# Patient Record
Sex: FEMALE | Race: WHITE | Employment: OTHER
[De-identification: names, ages, dates, MRNs, and addresses within clinical notes are randomized per-mention and may not be internally consistent; named-entity substitution may affect disease eponyms.]

---

## 2017-01-17 DIAGNOSIS — F41.9 ANXIETY: ICD-10-CM

## 2017-01-17 DIAGNOSIS — F32.0 MILD SINGLE CURRENT EPISODE OF MAJOR DEPRESSIVE DISORDER (HCC): ICD-10-CM

## 2017-01-18 DIAGNOSIS — K21.9 GASTROESOPHAGEAL REFLUX DISEASE, ESOPHAGITIS PRESENCE NOT SPECIFIED: ICD-10-CM

## 2017-01-18 DIAGNOSIS — E78.5 HYPERLIPIDEMIA, UNSPECIFIED HYPERLIPIDEMIA TYPE: ICD-10-CM

## 2017-01-18 RX ORDER — ATORVASTATIN CALCIUM 80 MG/1
80 TABLET, FILM COATED ORAL DAILY
Qty: 90 TABLET | Refills: 1 | Status: SHIPPED | OUTPATIENT
Start: 2017-01-18 | End: 2017-06-15 | Stop reason: SDUPTHER

## 2017-01-18 RX ORDER — RANITIDINE 150 MG/1
150 TABLET ORAL NIGHTLY
Qty: 90 TABLET | Refills: 1 | Status: SHIPPED | OUTPATIENT
Start: 2017-01-18 | End: 2017-06-15 | Stop reason: SDUPTHER

## 2017-01-18 RX ORDER — LORAZEPAM 0.5 MG/1
0.5 TABLET ORAL 2 TIMES DAILY PRN
Qty: 60 TABLET | Refills: 0 | Status: SHIPPED | OUTPATIENT
Start: 2017-01-18 | End: 2017-02-21 | Stop reason: SDUPTHER

## 2017-01-27 ENCOUNTER — CARE COORDINATION (OUTPATIENT)
Dept: CARE COORDINATION | Facility: CLINIC | Age: 67
End: 2017-01-27

## 2017-02-02 ENCOUNTER — OFFICE VISIT (OUTPATIENT)
Dept: FAMILY MEDICINE CLINIC | Facility: CLINIC | Age: 67
End: 2017-02-02

## 2017-02-02 VITALS
BODY MASS INDEX: 43.59 KG/M2 | HEART RATE: 64 BPM | TEMPERATURE: 96.5 F | DIASTOLIC BLOOD PRESSURE: 80 MMHG | SYSTOLIC BLOOD PRESSURE: 120 MMHG | WEIGHT: 246 LBS | HEIGHT: 63 IN | RESPIRATION RATE: 20 BRPM

## 2017-02-02 DIAGNOSIS — M43.10 SPONDYLOLISTHESIS, UNSPECIFIED SPINAL REGION: ICD-10-CM

## 2017-02-02 DIAGNOSIS — G47.33 OSA ON CPAP: ICD-10-CM

## 2017-02-02 DIAGNOSIS — M25.511 CHRONIC RIGHT SHOULDER PAIN: ICD-10-CM

## 2017-02-02 DIAGNOSIS — E78.5 HYPERLIPIDEMIA WITH TARGET LDL LESS THAN 100: ICD-10-CM

## 2017-02-02 DIAGNOSIS — G89.29 CHRONIC RIGHT SHOULDER PAIN: ICD-10-CM

## 2017-02-02 DIAGNOSIS — E66.01 MORBID OBESITY WITH BMI OF 40.0-44.9, ADULT (HCC): ICD-10-CM

## 2017-02-02 DIAGNOSIS — E11.21 DIABETIC NEPHROPATHY ASSOCIATED WITH TYPE 2 DIABETES MELLITUS (HCC): ICD-10-CM

## 2017-02-02 DIAGNOSIS — Z23 NEED FOR VACCINATION WITH 13-POLYVALENT PNEUMOCOCCAL CONJUGATE VACCINE: ICD-10-CM

## 2017-02-02 DIAGNOSIS — E66.09 NON MORBID OBESITY DUE TO EXCESS CALORIES: ICD-10-CM

## 2017-02-02 DIAGNOSIS — Z99.89 OSA ON CPAP: ICD-10-CM

## 2017-02-02 DIAGNOSIS — Z23 NEED FOR TDAP VACCINATION: ICD-10-CM

## 2017-02-02 DIAGNOSIS — N18.30 TYPE 2 DIABETES MELLITUS WITH STAGE 3 CHRONIC KIDNEY DISEASE, WITH LONG-TERM CURRENT USE OF INSULIN (HCC): ICD-10-CM

## 2017-02-02 DIAGNOSIS — J45.20 MILD INTERMITTENT ASTHMA WITHOUT COMPLICATION: ICD-10-CM

## 2017-02-02 DIAGNOSIS — F32.4 DEPRESSION, MAJOR, SINGLE EPISODE, IN PARTIAL REMISSION (HCC): ICD-10-CM

## 2017-02-02 DIAGNOSIS — Z98.1 S/P LUMBAR FUSION: ICD-10-CM

## 2017-02-02 DIAGNOSIS — J30.1 SEASONAL ALLERGIC RHINITIS DUE TO POLLEN: ICD-10-CM

## 2017-02-02 DIAGNOSIS — K21.9 GASTROESOPHAGEAL REFLUX DISEASE, ESOPHAGITIS PRESENCE NOT SPECIFIED: ICD-10-CM

## 2017-02-02 DIAGNOSIS — Z79.4 TYPE 2 DIABETES MELLITUS WITH STAGE 3 CHRONIC KIDNEY DISEASE, WITH LONG-TERM CURRENT USE OF INSULIN (HCC): ICD-10-CM

## 2017-02-02 DIAGNOSIS — I10 ESSENTIAL HYPERTENSION: Primary | ICD-10-CM

## 2017-02-02 DIAGNOSIS — G25.81 RESTLESS LEG SYNDROME: ICD-10-CM

## 2017-02-02 DIAGNOSIS — E11.22 TYPE 2 DIABETES MELLITUS WITH STAGE 3 CHRONIC KIDNEY DISEASE, WITH LONG-TERM CURRENT USE OF INSULIN (HCC): ICD-10-CM

## 2017-02-02 DIAGNOSIS — E11.21 TYPE 2 DIABETES WITH NEPHROPATHY (HCC): ICD-10-CM

## 2017-02-02 DIAGNOSIS — F41.9 ANXIETY: ICD-10-CM

## 2017-02-02 DIAGNOSIS — F32.0 MILD SINGLE CURRENT EPISODE OF MAJOR DEPRESSIVE DISORDER (HCC): ICD-10-CM

## 2017-02-02 PROCEDURE — 99214 OFFICE O/P EST MOD 30 MIN: CPT | Performed by: PEDIATRICS

## 2017-02-02 PROCEDURE — 90670 PCV13 VACCINE IM: CPT | Performed by: PEDIATRICS

## 2017-02-02 PROCEDURE — G0009 ADMIN PNEUMOCOCCAL VACCINE: HCPCS | Performed by: PEDIATRICS

## 2017-02-02 RX ORDER — HUMAN INSULIN 100 [USP'U]/ML
INJECTION, SUSPENSION SUBCUTANEOUS 2 TIMES DAILY WITH MEALS
COMMUNITY
Start: 2016-12-15

## 2017-02-02 ASSESSMENT — ENCOUNTER SYMPTOMS
CONSTIPATION: 0
STRIDOR: 0
EYE REDNESS: 0
EYE DISCHARGE: 0
SORE THROAT: 0
COLOR CHANGE: 0
SINUS PRESSURE: 0
VOMITING: 0
DIARRHEA: 0
NAUSEA: 0
EYE PAIN: 0
BLURRED VISION: 0
WHEEZING: 0
ABDOMINAL PAIN: 0
COUGH: 0
SHORTNESS OF BREATH: 0
BLOOD IN STOOL: 0

## 2017-02-07 PROBLEM — E11.21 DIABETIC NEPHROPATHY ASSOCIATED WITH TYPE 2 DIABETES MELLITUS (HCC): Status: ACTIVE | Noted: 2017-02-07

## 2017-02-07 ASSESSMENT — ENCOUNTER SYMPTOMS: BACK PAIN: 0

## 2017-02-10 ENCOUNTER — CARE COORDINATION (OUTPATIENT)
Dept: CARE COORDINATION | Facility: CLINIC | Age: 67
End: 2017-02-10

## 2017-02-21 DIAGNOSIS — F41.9 ANXIETY: ICD-10-CM

## 2017-02-21 DIAGNOSIS — F32.0 MILD SINGLE CURRENT EPISODE OF MAJOR DEPRESSIVE DISORDER (HCC): ICD-10-CM

## 2017-02-21 RX ORDER — LORAZEPAM 0.5 MG/1
0.5 TABLET ORAL 2 TIMES DAILY PRN
Qty: 60 TABLET | Refills: 0 | Status: SHIPPED | OUTPATIENT
Start: 2017-02-21 | End: 2017-03-20 | Stop reason: SDUPTHER

## 2017-03-06 RX ORDER — GABAPENTIN 300 MG/1
300 CAPSULE ORAL 3 TIMES DAILY
Qty: 270 CAPSULE | Refills: 1 | Status: SHIPPED | OUTPATIENT
Start: 2017-03-06 | End: 2017-04-17 | Stop reason: SDUPTHER

## 2017-03-10 ENCOUNTER — CARE COORDINATION (OUTPATIENT)
Dept: CARE COORDINATION | Facility: CLINIC | Age: 67
End: 2017-03-10

## 2017-03-21 ENCOUNTER — HOSPITAL ENCOUNTER (OUTPATIENT)
Age: 67
Discharge: HOME | End: 2017-03-21
Payer: MEDICARE

## 2017-03-21 ENCOUNTER — HOSPITAL ENCOUNTER (OUTPATIENT)
Dept: GENERAL RADIOLOGY | Age: 67
Discharge: HOME | End: 2017-03-21
Payer: MEDICARE

## 2017-03-21 DIAGNOSIS — G89.29 CHRONIC RIGHT SHOULDER PAIN: ICD-10-CM

## 2017-03-21 DIAGNOSIS — M25.511 CHRONIC RIGHT SHOULDER PAIN: ICD-10-CM

## 2017-03-21 LAB
ALBUMIN SERPL-MCNC: 4 G/DL (ref 3.5–5.2)
ALBUMIN/GLOBULIN RATIO: ABNORMAL (ref 1–2.5)
ALP BLD-CCNC: 177 U/L (ref 35–104)
ALT SERPL-CCNC: 14 U/L (ref 5–33)
ANION GAP SERPL CALCULATED.3IONS-SCNC: 15 MMOL/L (ref 9–17)
AST SERPL-CCNC: 14 U/L
BILIRUB SERPL-MCNC: 0.22 MG/DL (ref 0.3–1.2)
BUN BLDV-MCNC: 20 MG/DL (ref 8–23)
BUN/CREAT BLD: 16 (ref 9–20)
CALCIUM SERPL-MCNC: 9 MG/DL (ref 8.6–10.4)
CHLORIDE BLD-SCNC: 103 MMOL/L (ref 98–107)
CHOLESTEROL/HDL RATIO: 4.4
CHOLESTEROL: 159 MG/DL
CO2: 23 MMOL/L (ref 20–31)
CREAT SERPL-MCNC: 1.25 MG/DL (ref 0.5–0.9)
CREATININE URINE: 58.8 MG/DL (ref 28–217)
GFR AFRICAN AMERICAN: 52 ML/MIN
GFR NON-AFRICAN AMERICAN: 42 ML/MIN
GFR SERPL CREATININE-BSD FRML MDRD: ABNORMAL ML/MIN/{1.73_M2}
GFR SERPL CREATININE-BSD FRML MDRD: ABNORMAL ML/MIN/{1.73_M2}
GLUCOSE BLD-MCNC: 140 MG/DL (ref 70–99)
HDLC SERPL-MCNC: 36 MG/DL
LDL CHOLESTEROL: 76 MG/DL (ref 0–130)
MICROALBUMIN/CREAT 24H UR: <12 MG/L
MICROALBUMIN/CREAT UR-RTO: 20 MCG/MG CREAT
POTASSIUM SERPL-SCNC: 3.9 MMOL/L (ref 3.7–5.3)
SODIUM BLD-SCNC: 141 MMOL/L (ref 135–144)
TOTAL PROTEIN: 7 G/DL (ref 6.4–8.3)
TRIGL SERPL-MCNC: 235 MG/DL
VITAMIN B-12: 387 PG/ML (ref 211–946)
VLDLC SERPL CALC-MCNC: ABNORMAL MG/DL (ref 1–30)

## 2017-03-21 PROCEDURE — 73030 X-RAY EXAM OF SHOULDER: CPT

## 2017-03-21 PROCEDURE — 80061 LIPID PANEL: CPT

## 2017-03-21 PROCEDURE — 80053 COMPREHEN METABOLIC PANEL: CPT

## 2017-03-21 PROCEDURE — 82043 UR ALBUMIN QUANTITATIVE: CPT

## 2017-03-21 PROCEDURE — 82570 ASSAY OF URINE CREATININE: CPT

## 2017-03-21 PROCEDURE — 82607 VITAMIN B-12: CPT

## 2017-03-24 LAB — HBA1C MFR BLD: 7.1 %

## 2017-03-27 ENCOUNTER — CARE COORDINATION (OUTPATIENT)
Dept: CARE COORDINATION | Age: 67
End: 2017-03-27

## 2017-03-27 DIAGNOSIS — E78.5 HYPERLIPIDEMIA WITH TARGET LDL LESS THAN 100: Primary | ICD-10-CM

## 2017-03-27 DIAGNOSIS — I10 ESSENTIAL HYPERTENSION: ICD-10-CM

## 2017-03-27 DIAGNOSIS — D64.9 ANEMIA, UNSPECIFIED TYPE: ICD-10-CM

## 2017-03-27 RX ORDER — TIZANIDINE 2 MG/1
2 TABLET ORAL EVERY 8 HOURS PRN
Qty: 270 TABLET | Refills: 1 | Status: SHIPPED | OUTPATIENT
Start: 2017-03-27 | End: 2017-07-31 | Stop reason: SDUPTHER

## 2017-04-17 ENCOUNTER — TELEPHONE (OUTPATIENT)
Dept: FAMILY MEDICINE CLINIC | Age: 67
End: 2017-04-17

## 2017-04-17 DIAGNOSIS — F41.9 ANXIETY: ICD-10-CM

## 2017-04-17 RX ORDER — LORAZEPAM 0.5 MG/1
0.5 TABLET ORAL 2 TIMES DAILY PRN
Qty: 180 TABLET | Refills: 0 | Status: SHIPPED | OUTPATIENT
Start: 2017-04-17 | End: 2017-06-26 | Stop reason: SDUPTHER

## 2017-04-17 RX ORDER — GABAPENTIN 300 MG/1
300 CAPSULE ORAL 3 TIMES DAILY
Qty: 270 CAPSULE | Refills: 1 | Status: SHIPPED | OUTPATIENT
Start: 2017-04-17 | End: 2017-09-22 | Stop reason: SDUPTHER

## 2017-05-03 ENCOUNTER — OFFICE VISIT (OUTPATIENT)
Dept: FAMILY MEDICINE CLINIC | Age: 67
End: 2017-05-03
Payer: MEDICARE

## 2017-05-03 ENCOUNTER — CARE COORDINATION (OUTPATIENT)
Dept: CARE COORDINATION | Age: 67
End: 2017-05-03

## 2017-05-03 VITALS
WEIGHT: 246 LBS | BODY MASS INDEX: 43.58 KG/M2 | HEART RATE: 76 BPM | TEMPERATURE: 97.1 F | DIASTOLIC BLOOD PRESSURE: 74 MMHG | RESPIRATION RATE: 20 BRPM | SYSTOLIC BLOOD PRESSURE: 121 MMHG

## 2017-05-03 DIAGNOSIS — Z79.4 TYPE 2 DIABETES MELLITUS WITH STAGE 3 CHRONIC KIDNEY DISEASE, WITH LONG-TERM CURRENT USE OF INSULIN (HCC): ICD-10-CM

## 2017-05-03 DIAGNOSIS — M43.10 SPONDYLOLISTHESIS, UNSPECIFIED SPINAL REGION: ICD-10-CM

## 2017-05-03 DIAGNOSIS — G47.33 OSA ON CPAP: ICD-10-CM

## 2017-05-03 DIAGNOSIS — I10 ESSENTIAL HYPERTENSION: Primary | ICD-10-CM

## 2017-05-03 DIAGNOSIS — G25.81 RESTLESS LEG SYNDROME: ICD-10-CM

## 2017-05-03 DIAGNOSIS — J30.1 SEASONAL ALLERGIC RHINITIS DUE TO POLLEN: ICD-10-CM

## 2017-05-03 DIAGNOSIS — G89.29 CHRONIC RIGHT SHOULDER PAIN: ICD-10-CM

## 2017-05-03 DIAGNOSIS — F32.4 DEPRESSION, MAJOR, SINGLE EPISODE, IN PARTIAL REMISSION (HCC): ICD-10-CM

## 2017-05-03 DIAGNOSIS — K21.9 GASTROESOPHAGEAL REFLUX DISEASE, ESOPHAGITIS PRESENCE NOT SPECIFIED: ICD-10-CM

## 2017-05-03 DIAGNOSIS — Z99.89 OSA ON CPAP: ICD-10-CM

## 2017-05-03 DIAGNOSIS — F32.0 MILD SINGLE CURRENT EPISODE OF MAJOR DEPRESSIVE DISORDER (HCC): ICD-10-CM

## 2017-05-03 DIAGNOSIS — J45.20 MILD INTERMITTENT ASTHMA WITHOUT COMPLICATION: ICD-10-CM

## 2017-05-03 DIAGNOSIS — M25.511 CHRONIC RIGHT SHOULDER PAIN: ICD-10-CM

## 2017-05-03 DIAGNOSIS — Z98.1 S/P LUMBAR FUSION: ICD-10-CM

## 2017-05-03 DIAGNOSIS — E66.01 MORBID OBESITY WITH BMI OF 40.0-44.9, ADULT (HCC): ICD-10-CM

## 2017-05-03 DIAGNOSIS — E78.5 HYPERLIPIDEMIA WITH TARGET LDL LESS THAN 100: ICD-10-CM

## 2017-05-03 DIAGNOSIS — N18.30 TYPE 2 DIABETES MELLITUS WITH STAGE 3 CHRONIC KIDNEY DISEASE, WITH LONG-TERM CURRENT USE OF INSULIN (HCC): ICD-10-CM

## 2017-05-03 DIAGNOSIS — F41.9 ANXIETY: ICD-10-CM

## 2017-05-03 DIAGNOSIS — E11.21 DIABETIC NEPHROPATHY ASSOCIATED WITH TYPE 2 DIABETES MELLITUS (HCC): ICD-10-CM

## 2017-05-03 DIAGNOSIS — E11.22 TYPE 2 DIABETES MELLITUS WITH STAGE 3 CHRONIC KIDNEY DISEASE, WITH LONG-TERM CURRENT USE OF INSULIN (HCC): ICD-10-CM

## 2017-05-03 PROCEDURE — 99214 OFFICE O/P EST MOD 30 MIN: CPT | Performed by: PEDIATRICS

## 2017-05-03 RX ORDER — OXYCODONE HYDROCHLORIDE AND ACETAMINOPHEN 5; 325 MG/1; MG/1
1 TABLET ORAL DAILY PRN
Qty: 30 TABLET | Refills: 0 | Status: SHIPPED | OUTPATIENT
Start: 2017-05-03 | End: 2017-06-08

## 2017-05-03 RX ORDER — METFORMIN HYDROCHLORIDE 500 MG/1
TABLET, EXTENDED RELEASE ORAL
COMMUNITY
Start: 2017-04-13 | End: 2017-08-04 | Stop reason: SDUPTHER

## 2017-05-03 ASSESSMENT — ENCOUNTER SYMPTOMS
HOARSE VOICE: 0
BLURRED VISION: 0
EYE PAIN: 0
CONSTIPATION: 0
COUGH: 0
ABDOMINAL PAIN: 0
NAUSEA: 0
SHORTNESS OF BREATH: 0
WHEEZING: 0
HEARTBURN: 0
FREQUENT THROAT CLEARING: 0
SPUTUM PRODUCTION: 0
BLOOD IN STOOL: 0
SORE THROAT: 0
CHEST TIGHTNESS: 0
SINUS PRESSURE: 0
DIARRHEA: 0
EYE DISCHARGE: 0
HEMOPTYSIS: 0
RHINORRHEA: 0
VOMITING: 0
TROUBLE SWALLOWING: 0
COLOR CHANGE: 0
STRIDOR: 0
ORTHOPNEA: 0
DIFFICULTY BREATHING: 0
BACK PAIN: 1
EYE REDNESS: 0

## 2017-05-05 RX ORDER — MOXIFLOXACIN 5 MG/ML
1 SOLUTION/ DROPS OPHTHALMIC 3 TIMES DAILY
Qty: 1 BOTTLE | Refills: 0 | Status: SHIPPED | OUTPATIENT
Start: 2017-05-05 | End: 2017-05-12

## 2017-05-17 ENCOUNTER — CARE COORDINATION (OUTPATIENT)
Dept: CARE COORDINATION | Age: 67
End: 2017-05-17

## 2017-05-26 DIAGNOSIS — F41.9 ANXIETY: ICD-10-CM

## 2017-05-26 RX ORDER — CITALOPRAM 40 MG/1
40 TABLET ORAL DAILY
Qty: 90 TABLET | Refills: 1 | Status: SHIPPED | OUTPATIENT
Start: 2017-05-26 | End: 2017-10-11 | Stop reason: SDUPTHER

## 2017-05-31 ENCOUNTER — CARE COORDINATION (OUTPATIENT)
Dept: CARE COORDINATION | Age: 67
End: 2017-05-31

## 2017-06-03 ENCOUNTER — TELEPHONE (OUTPATIENT)
Dept: FAMILY MEDICINE CLINIC | Age: 67
End: 2017-06-03

## 2017-06-14 ENCOUNTER — CARE COORDINATION (OUTPATIENT)
Dept: CARE COORDINATION | Age: 67
End: 2017-06-14

## 2017-06-15 DIAGNOSIS — E78.5 HYPERLIPIDEMIA, UNSPECIFIED HYPERLIPIDEMIA TYPE: ICD-10-CM

## 2017-06-15 DIAGNOSIS — K21.9 GASTROESOPHAGEAL REFLUX DISEASE, ESOPHAGITIS PRESENCE NOT SPECIFIED: ICD-10-CM

## 2017-06-15 RX ORDER — ATORVASTATIN CALCIUM 80 MG/1
80 TABLET, FILM COATED ORAL DAILY
Qty: 90 TABLET | Refills: 1 | Status: SHIPPED | OUTPATIENT
Start: 2017-06-15 | End: 2017-10-11 | Stop reason: SDUPTHER

## 2017-06-15 RX ORDER — RANITIDINE 150 MG/1
150 TABLET ORAL NIGHTLY
Qty: 90 TABLET | Refills: 1 | Status: SHIPPED | OUTPATIENT
Start: 2017-06-15 | End: 2017-10-25 | Stop reason: SDUPTHER

## 2017-06-26 DIAGNOSIS — F41.9 ANXIETY: ICD-10-CM

## 2017-06-27 RX ORDER — LORAZEPAM 0.5 MG/1
0.5 TABLET ORAL 2 TIMES DAILY PRN
Qty: 180 TABLET | Refills: 0 | Status: SHIPPED | OUTPATIENT
Start: 2017-06-27 | End: 2017-09-13 | Stop reason: SDUPTHER

## 2017-07-19 ENCOUNTER — CARE COORDINATION (OUTPATIENT)
Dept: CARE COORDINATION | Age: 67
End: 2017-07-19

## 2017-07-26 ENCOUNTER — CARE COORDINATION (OUTPATIENT)
Dept: CARE COORDINATION | Age: 67
End: 2017-07-26

## 2017-08-01 RX ORDER — TIZANIDINE 2 MG/1
2 TABLET ORAL EVERY 8 HOURS PRN
Qty: 270 TABLET | Refills: 1 | Status: SHIPPED | OUTPATIENT
Start: 2017-08-01 | End: 2017-11-03 | Stop reason: SDUPTHER

## 2017-08-02 ENCOUNTER — CARE COORDINATION (OUTPATIENT)
Dept: CARE COORDINATION | Age: 67
End: 2017-08-02

## 2017-08-04 ENCOUNTER — OFFICE VISIT (OUTPATIENT)
Dept: FAMILY MEDICINE CLINIC | Age: 67
End: 2017-08-04
Payer: MEDICARE

## 2017-08-04 VITALS
BODY MASS INDEX: 44.56 KG/M2 | HEIGHT: 63 IN | RESPIRATION RATE: 16 BRPM | WEIGHT: 251.5 LBS | HEART RATE: 64 BPM | SYSTOLIC BLOOD PRESSURE: 124 MMHG | DIASTOLIC BLOOD PRESSURE: 72 MMHG

## 2017-08-04 DIAGNOSIS — F41.9 ANXIETY: ICD-10-CM

## 2017-08-04 DIAGNOSIS — F32.0 MILD SINGLE CURRENT EPISODE OF MAJOR DEPRESSIVE DISORDER (HCC): ICD-10-CM

## 2017-08-04 DIAGNOSIS — K21.9 GASTROESOPHAGEAL REFLUX DISEASE, ESOPHAGITIS PRESENCE NOT SPECIFIED: ICD-10-CM

## 2017-08-04 DIAGNOSIS — J45.20 MILD INTERMITTENT ASTHMA WITHOUT COMPLICATION: ICD-10-CM

## 2017-08-04 DIAGNOSIS — G25.81 RESTLESS LEG SYNDROME: ICD-10-CM

## 2017-08-04 DIAGNOSIS — G47.33 OSA ON CPAP: ICD-10-CM

## 2017-08-04 DIAGNOSIS — Z91.81 AT HIGH RISK FOR FALLS: ICD-10-CM

## 2017-08-04 DIAGNOSIS — E78.5 HYPERLIPIDEMIA WITH TARGET LDL LESS THAN 100: ICD-10-CM

## 2017-08-04 DIAGNOSIS — Z99.89 OSA ON CPAP: ICD-10-CM

## 2017-08-04 DIAGNOSIS — I10 ESSENTIAL HYPERTENSION: Primary | ICD-10-CM

## 2017-08-04 DIAGNOSIS — J30.1 SEASONAL ALLERGIC RHINITIS DUE TO POLLEN: ICD-10-CM

## 2017-08-04 PROCEDURE — 99214 OFFICE O/P EST MOD 30 MIN: CPT | Performed by: PEDIATRICS

## 2017-08-04 RX ORDER — DOXEPIN HYDROCHLORIDE 50 MG/1
50 CAPSULE ORAL NIGHTLY
Qty: 90 CAPSULE | Refills: 1 | Status: SHIPPED | OUTPATIENT
Start: 2017-08-04 | End: 2017-09-13 | Stop reason: ALTCHOICE

## 2017-08-04 RX ORDER — OXYCODONE HYDROCHLORIDE AND ACETAMINOPHEN 5; 325 MG/1; MG/1
1 TABLET ORAL EVERY 4 HOURS PRN
COMMUNITY
End: 2017-08-08 | Stop reason: SDUPTHER

## 2017-08-04 ASSESSMENT — ENCOUNTER SYMPTOMS
EYE DISCHARGE: 0
SINUS PRESSURE: 0
STRIDOR: 0
ORTHOPNEA: 0
BLOOD IN STOOL: 0
RHINORRHEA: 0
SORE THROAT: 0
EYE REDNESS: 0
COLOR CHANGE: 0
EYE PAIN: 0
ABDOMINAL PAIN: 0
BLURRED VISION: 0
SHORTNESS OF BREATH: 0
NAUSEA: 0
CONSTIPATION: 0
VOMITING: 0
TROUBLE SWALLOWING: 0
COUGH: 0
WHEEZING: 0
DIARRHEA: 0

## 2017-08-04 ASSESSMENT — PATIENT HEALTH QUESTIONNAIRE - PHQ9
2. FEELING DOWN, DEPRESSED OR HOPELESS: 1
SUM OF ALL RESPONSES TO PHQ QUESTIONS 1-9: 2
1. LITTLE INTEREST OR PLEASURE IN DOING THINGS: 1
SUM OF ALL RESPONSES TO PHQ9 QUESTIONS 1 & 2: 2

## 2017-08-06 ASSESSMENT — ENCOUNTER SYMPTOMS: BACK PAIN: 1

## 2017-08-08 RX ORDER — OXYCODONE HYDROCHLORIDE AND ACETAMINOPHEN 5; 325 MG/1; MG/1
1 TABLET ORAL DAILY PRN
Qty: 30 TABLET | Refills: 0 | Status: SHIPPED | OUTPATIENT
Start: 2017-08-08 | End: 2017-11-03 | Stop reason: SDUPTHER

## 2017-08-09 ENCOUNTER — TELEPHONE (OUTPATIENT)
Dept: FAMILY MEDICINE CLINIC | Age: 67
End: 2017-08-09

## 2017-08-11 RX ORDER — AMITRIPTYLINE HYDROCHLORIDE 10 MG/1
10 TABLET, FILM COATED ORAL NIGHTLY
Qty: 90 TABLET | Refills: 1 | Status: SHIPPED | OUTPATIENT
Start: 2017-08-11 | End: 2018-06-13 | Stop reason: ALTCHOICE

## 2017-08-16 ENCOUNTER — CARE COORDINATION (OUTPATIENT)
Dept: CARE COORDINATION | Age: 67
End: 2017-08-16

## 2017-08-27 RX ORDER — LOSARTAN POTASSIUM 100 MG/1
100 TABLET ORAL DAILY
Qty: 30 TABLET | Refills: 5 | Status: SHIPPED | OUTPATIENT
Start: 2017-08-27 | End: 2018-03-15 | Stop reason: SDUPTHER

## 2017-09-06 ENCOUNTER — CARE COORDINATION (OUTPATIENT)
Dept: CARE COORDINATION | Age: 67
End: 2017-09-06

## 2017-09-11 ENCOUNTER — HOSPITAL ENCOUNTER (OUTPATIENT)
Age: 67
Setting detail: SPECIMEN
Discharge: HOME | End: 2017-09-11
Payer: MEDICARE

## 2017-09-11 DIAGNOSIS — D64.9 ANEMIA, UNSPECIFIED TYPE: ICD-10-CM

## 2017-09-11 DIAGNOSIS — E78.5 HYPERLIPIDEMIA WITH TARGET LDL LESS THAN 100: ICD-10-CM

## 2017-09-11 LAB
ALBUMIN SERPL-MCNC: 4.2 G/DL (ref 3.5–5.2)
ALBUMIN/GLOBULIN RATIO: 1.8 (ref 1–2.5)
ALP BLD-CCNC: 187 U/L (ref 35–104)
ALT SERPL-CCNC: 17 U/L (ref 5–33)
ANION GAP SERPL CALCULATED.3IONS-SCNC: 19 MMOL/L (ref 9–17)
AST SERPL-CCNC: 17 U/L
BILIRUB SERPL-MCNC: 0.26 MG/DL (ref 0.3–1.2)
BUN BLDV-MCNC: 28 MG/DL (ref 8–23)
BUN/CREAT BLD: ABNORMAL (ref 9–20)
CALCIUM SERPL-MCNC: 9 MG/DL (ref 8.6–10.4)
CHLORIDE BLD-SCNC: 106 MMOL/L (ref 98–107)
CHOLESTEROL/HDL RATIO: 4.3
CHOLESTEROL: 154 MG/DL
CO2: 19 MMOL/L (ref 20–31)
CREAT SERPL-MCNC: 1.41 MG/DL (ref 0.5–0.9)
GFR AFRICAN AMERICAN: 45 ML/MIN
GFR NON-AFRICAN AMERICAN: 37 ML/MIN
GFR SERPL CREATININE-BSD FRML MDRD: ABNORMAL ML/MIN/{1.73_M2}
GFR SERPL CREATININE-BSD FRML MDRD: ABNORMAL ML/MIN/{1.73_M2}
GLUCOSE FASTING: 116 MG/DL (ref 70–99)
HCT VFR BLD CALC: 35.8 % (ref 36–46)
HDLC SERPL-MCNC: 36 MG/DL
HEMOGLOBIN: 11.8 G/DL (ref 12–16)
LDL CHOLESTEROL: 76 MG/DL (ref 0–130)
MCH RBC QN AUTO: 28.6 PG (ref 26–34)
MCHC RBC AUTO-ENTMCNC: 33 G/DL (ref 31–37)
MCV RBC AUTO: 86.7 FL (ref 80–100)
PDW BLD-RTO: 14.9 % (ref 12.5–15.4)
PLATELET # BLD: 211 K/UL (ref 140–450)
PMV BLD AUTO: 8.6 FL (ref 6–12)
POTASSIUM SERPL-SCNC: 4.6 MMOL/L (ref 3.7–5.3)
RBC # BLD: 4.13 M/UL (ref 4–5.2)
SODIUM BLD-SCNC: 144 MMOL/L (ref 135–144)
TOTAL PROTEIN: 6.5 G/DL (ref 6.4–8.3)
TRIGL SERPL-MCNC: 209 MG/DL
VLDLC SERPL CALC-MCNC: ABNORMAL MG/DL (ref 1–30)
WBC # BLD: 6.8 K/UL (ref 3.5–11)

## 2017-09-13 ENCOUNTER — OFFICE VISIT (OUTPATIENT)
Dept: FAMILY MEDICINE CLINIC | Age: 67
End: 2017-09-13
Payer: MEDICARE

## 2017-09-13 VITALS
HEART RATE: 68 BPM | DIASTOLIC BLOOD PRESSURE: 76 MMHG | RESPIRATION RATE: 20 BRPM | BODY MASS INDEX: 43.06 KG/M2 | SYSTOLIC BLOOD PRESSURE: 121 MMHG | WEIGHT: 243 LBS | TEMPERATURE: 97.8 F

## 2017-09-13 DIAGNOSIS — F32.0 MILD SINGLE CURRENT EPISODE OF MAJOR DEPRESSIVE DISORDER (HCC): Primary | ICD-10-CM

## 2017-09-13 DIAGNOSIS — Z23 NEED FOR INFLUENZA VACCINATION: ICD-10-CM

## 2017-09-13 DIAGNOSIS — F41.9 ANXIETY: ICD-10-CM

## 2017-09-13 PROCEDURE — 99214 OFFICE O/P EST MOD 30 MIN: CPT | Performed by: PEDIATRICS

## 2017-09-13 PROCEDURE — G0008 ADMIN INFLUENZA VIRUS VAC: HCPCS | Performed by: PEDIATRICS

## 2017-09-13 PROCEDURE — 90688 IIV4 VACCINE SPLT 0.5 ML IM: CPT | Performed by: PEDIATRICS

## 2017-09-13 RX ORDER — LORAZEPAM 0.5 MG/1
0.5 TABLET ORAL 2 TIMES DAILY PRN
Qty: 180 TABLET | Refills: 0 | Status: CANCELLED | OUTPATIENT
Start: 2017-09-13 | End: 2018-09-13

## 2017-09-13 RX ORDER — LORAZEPAM 0.5 MG/1
0.5 TABLET ORAL 2 TIMES DAILY PRN
Qty: 180 TABLET | Refills: 0 | Status: SHIPPED | OUTPATIENT
Start: 2017-09-13 | End: 2017-12-06 | Stop reason: SDUPTHER

## 2017-09-13 ASSESSMENT — ENCOUNTER SYMPTOMS
STRIDOR: 0
NAUSEA: 0
EYE DISCHARGE: 0
BLOOD IN STOOL: 0
ABDOMINAL PAIN: 0
EYE PAIN: 0
DIARRHEA: 0
CONSTIPATION: 0
TROUBLE SWALLOWING: 0
SORE THROAT: 0
COUGH: 0
EYE REDNESS: 0
SHORTNESS OF BREATH: 0
COLOR CHANGE: 0
RHINORRHEA: 0
SINUS PRESSURE: 0
VOMITING: 0
WHEEZING: 0
BACK PAIN: 1

## 2017-09-14 ENCOUNTER — TELEPHONE (OUTPATIENT)
Dept: FAMILY MEDICINE CLINIC | Age: 67
End: 2017-09-14

## 2017-09-14 DIAGNOSIS — I10 ESSENTIAL HYPERTENSION: Primary | ICD-10-CM

## 2017-09-14 DIAGNOSIS — D64.9 LOW HEMOGLOBIN AND LOW HEMATOCRIT: ICD-10-CM

## 2017-09-14 DIAGNOSIS — E11.8 TYPE 2 DIABETES MELLITUS WITH COMPLICATION, WITH LONG-TERM CURRENT USE OF INSULIN (HCC): ICD-10-CM

## 2017-09-14 DIAGNOSIS — E78.2 ELEVATED TRIGLYCERIDES WITH HIGH CHOLESTEROL: ICD-10-CM

## 2017-09-14 DIAGNOSIS — E78.6 LOW HDL (UNDER 40): ICD-10-CM

## 2017-09-14 DIAGNOSIS — Z79.4 TYPE 2 DIABETES MELLITUS WITH COMPLICATION, WITH LONG-TERM CURRENT USE OF INSULIN (HCC): ICD-10-CM

## 2017-09-14 DIAGNOSIS — E78.5 HYPERLIPIDEMIA WITH TARGET LDL LESS THAN 100: ICD-10-CM

## 2017-09-14 DIAGNOSIS — G47.30 SLEEP APNEA, UNSPECIFIED TYPE: ICD-10-CM

## 2017-09-22 RX ORDER — GABAPENTIN 300 MG/1
300 CAPSULE ORAL 3 TIMES DAILY
Qty: 270 CAPSULE | Refills: 1 | Status: SHIPPED | OUTPATIENT
Start: 2017-09-22 | End: 2018-05-03 | Stop reason: SDUPTHER

## 2017-10-09 ENCOUNTER — CARE COORDINATION (OUTPATIENT)
Dept: CARE COORDINATION | Age: 67
End: 2017-10-09

## 2017-10-09 NOTE — CARE COORDINATION
tablet Take 1 tablet by mouth every morning (before breakfast)  5/13/14   Historical Provider, MD IZAGUIRRE ULTRAFINE III SHORT PEN 31G X 8 MM MISC  1/23/14   Historical Provider, MD   Ascorbic Acid (VITAMIN C) 500 MG tablet Take 1,000 mg by mouth nightly     Historical Provider, MD   omeprazole (PRILOSEC) 20 MG capsule Take 20 mg by mouth nightly Indications: GERD-Related Laryngitis     Historical Provider, MD   albuterol (PROVENTIL) (2.5 MG/3ML) 0.083% nebulizer solution Take 2.5 mg by nebulization every 4 hours as needed for Wheezing.     Yumiko Macedo MD   Multiple Vitamins-Minerals [place] [facility] COMPLETE PO) Take 1 tablet by mouth nightly     Historical Provider, MD   Sennosides-Docusate Sodium (SENNA S PO) Take 1 tablet by mouth nightly     Historical Provider, MD   Probiotic Product ([address]) Take 1 tablet by mouth nightly     Historical Provider, MD       Future Appointments  Date Time Provider Eva Dent   11/3/2017 11:00 AM MD Ángel Chavarria

## 2017-10-11 DIAGNOSIS — F41.9 ANXIETY: ICD-10-CM

## 2017-10-11 DIAGNOSIS — E78.5 HYPERLIPIDEMIA, UNSPECIFIED HYPERLIPIDEMIA TYPE: ICD-10-CM

## 2017-10-11 RX ORDER — ATORVASTATIN CALCIUM 80 MG/1
80 TABLET, FILM COATED ORAL DAILY
Qty: 90 TABLET | Refills: 1 | Status: SHIPPED | OUTPATIENT
Start: 2017-10-11 | End: 2017-10-11 | Stop reason: SDUPTHER

## 2017-10-11 RX ORDER — CITALOPRAM 40 MG/1
40 TABLET ORAL DAILY
Qty: 90 TABLET | Refills: 1 | Status: SHIPPED | OUTPATIENT
Start: 2017-10-11 | End: 2018-03-09 | Stop reason: SDUPTHER

## 2017-10-11 RX ORDER — ATORVASTATIN CALCIUM 80 MG/1
80 TABLET, FILM COATED ORAL DAILY
Qty: 90 TABLET | Refills: 1 | Status: SHIPPED | OUTPATIENT
Start: 2017-10-11 | End: 2018-03-09 | Stop reason: SDUPTHER

## 2017-10-11 NOTE — TELEPHONE ENCOUNTER
Medication failed in transmission to pharm. Medication will need resent to [facility]  BP
Spondylisthesis     S/P lumbar fusion     Gastroesophageal reflux disease     Diabetic nephropathy associated with type 2 diabetes mellitus ([facility] [address] [address]

## 2017-10-11 NOTE — TELEPHONE ENCOUNTER
Spondylisthesis     S/P lumbar fusion     Gastroesophageal reflux disease     Diabetic nephropathy associated with type 2 diabetes mellitus ([facility] [address]

## 2017-10-25 DIAGNOSIS — K21.9 GASTROESOPHAGEAL REFLUX DISEASE, ESOPHAGITIS PRESENCE NOT SPECIFIED: ICD-10-CM

## 2017-10-25 RX ORDER — RANITIDINE 150 MG/1
150 TABLET ORAL NIGHTLY
Qty: 90 TABLET | Refills: 1 | Status: SHIPPED | OUTPATIENT
Start: 2017-10-25 | End: 2018-03-09 | Stop reason: SDUPTHER

## 2017-11-03 ENCOUNTER — OFFICE VISIT (OUTPATIENT)
Dept: FAMILY MEDICINE CLINIC | Age: 67
End: 2017-11-03
Payer: MEDICARE

## 2017-11-03 VITALS
SYSTOLIC BLOOD PRESSURE: 124 MMHG | RESPIRATION RATE: 18 BRPM | BODY MASS INDEX: 43.23 KG/M2 | TEMPERATURE: 98.5 F | WEIGHT: 244 LBS | DIASTOLIC BLOOD PRESSURE: 70 MMHG | HEART RATE: 78 BPM

## 2017-11-03 DIAGNOSIS — K21.9 GASTROESOPHAGEAL REFLUX DISEASE, ESOPHAGITIS PRESENCE NOT SPECIFIED: ICD-10-CM

## 2017-11-03 DIAGNOSIS — Z98.1 S/P LUMBAR FUSION: ICD-10-CM

## 2017-11-03 DIAGNOSIS — E11.22 TYPE 2 DIABETES MELLITUS WITH STAGE 3 CHRONIC KIDNEY DISEASE, WITH LONG-TERM CURRENT USE OF INSULIN (HCC): ICD-10-CM

## 2017-11-03 DIAGNOSIS — G25.81 RESTLESS LEG SYNDROME: ICD-10-CM

## 2017-11-03 DIAGNOSIS — I10 ESSENTIAL HYPERTENSION: Primary | ICD-10-CM

## 2017-11-03 DIAGNOSIS — Z79.4 TYPE 2 DIABETES MELLITUS WITH STAGE 3 CHRONIC KIDNEY DISEASE, WITH LONG-TERM CURRENT USE OF INSULIN (HCC): ICD-10-CM

## 2017-11-03 DIAGNOSIS — N18.30 TYPE 2 DIABETES MELLITUS WITH STAGE 3 CHRONIC KIDNEY DISEASE, WITH LONG-TERM CURRENT USE OF INSULIN (HCC): ICD-10-CM

## 2017-11-03 DIAGNOSIS — E66.01 MORBID OBESITY WITH BMI OF 40.0-44.9, ADULT (HCC): ICD-10-CM

## 2017-11-03 DIAGNOSIS — M43.10 SPONDYLOLISTHESIS, UNSPECIFIED SPINAL REGION: ICD-10-CM

## 2017-11-03 DIAGNOSIS — J30.1 CHRONIC SEASONAL ALLERGIC RHINITIS DUE TO POLLEN: ICD-10-CM

## 2017-11-03 DIAGNOSIS — F41.9 ANXIETY: ICD-10-CM

## 2017-11-03 DIAGNOSIS — M70.62 TROCHANTERIC BURSITIS OF LEFT HIP: ICD-10-CM

## 2017-11-03 DIAGNOSIS — F32.1 MODERATE SINGLE CURRENT EPISODE OF MAJOR DEPRESSIVE DISORDER (HCC): ICD-10-CM

## 2017-11-03 DIAGNOSIS — E11.21 DIABETIC NEPHROPATHY ASSOCIATED WITH TYPE 2 DIABETES MELLITUS (HCC): ICD-10-CM

## 2017-11-03 DIAGNOSIS — J45.20 MILD INTERMITTENT ASTHMA WITHOUT COMPLICATION: ICD-10-CM

## 2017-11-03 DIAGNOSIS — G47.33 OSA ON CPAP: ICD-10-CM

## 2017-11-03 DIAGNOSIS — E78.5 HYPERLIPIDEMIA, UNSPECIFIED HYPERLIPIDEMIA TYPE: ICD-10-CM

## 2017-11-03 DIAGNOSIS — Z99.89 OSA ON CPAP: ICD-10-CM

## 2017-11-03 PROCEDURE — 3017F COLORECTAL CA SCREEN DOC REV: CPT | Performed by: PEDIATRICS

## 2017-11-03 PROCEDURE — 4040F PNEUMOC VAC/ADMIN/RCVD: CPT | Performed by: PEDIATRICS

## 2017-11-03 PROCEDURE — G8427 DOCREV CUR MEDS BY ELIG CLIN: HCPCS | Performed by: PEDIATRICS

## 2017-11-03 PROCEDURE — G8417 CALC BMI ABV UP PARAM F/U: HCPCS | Performed by: PEDIATRICS

## 2017-11-03 PROCEDURE — 1036F TOBACCO NON-USER: CPT | Performed by: PEDIATRICS

## 2017-11-03 PROCEDURE — G8399 PT W/DXA RESULTS DOCUMENT: HCPCS | Performed by: PEDIATRICS

## 2017-11-03 PROCEDURE — G8484 FLU IMMUNIZE NO ADMIN: HCPCS | Performed by: PEDIATRICS

## 2017-11-03 PROCEDURE — 1123F ACP DISCUSS/DSCN MKR DOCD: CPT | Performed by: PEDIATRICS

## 2017-11-03 PROCEDURE — 3014F SCREEN MAMMO DOC REV: CPT | Performed by: PEDIATRICS

## 2017-11-03 PROCEDURE — 99214 OFFICE O/P EST MOD 30 MIN: CPT | Performed by: PEDIATRICS

## 2017-11-03 PROCEDURE — 1090F PRES/ABSN URINE INCON ASSESS: CPT | Performed by: PEDIATRICS

## 2017-11-03 PROCEDURE — 3045F PR MOST RECENT HEMOGLOBIN A1C LEVEL 7.0-9.0%: CPT | Performed by: PEDIATRICS

## 2017-11-03 ASSESSMENT — ENCOUNTER SYMPTOMS
COUGH: 0
SINUS PRESSURE: 0
WHEEZING: 0
DIARRHEA: 0
EYE REDNESS: 0
SORE THROAT: 0
EYE DISCHARGE: 0
BLURRED VISION: 0
ABDOMINAL PAIN: 0
NAUSEA: 0
COLOR CHANGE: 0
STRIDOR: 0
ORTHOPNEA: 0
CONSTIPATION: 0
SHORTNESS OF BREATH: 0
TROUBLE SWALLOWING: 0
EYE PAIN: 0
VOMITING: 0
BACK PAIN: 1
RHINORRHEA: 0
BLOOD IN STOOL: 0

## 2017-11-03 NOTE — PROGRESS NOTES
mass. There is no tenderness. Musculoskeletal: She exhibits no edema. Right shoulder: She exhibits decreased range of motion, tenderness and decreased strength. Right hip: Normal. She exhibits normal strength. Left hip: She exhibits tenderness. She exhibits normal strength. Right knee: Normal.        Left knee: Normal.        Back:         Legs:  Lymphadenopathy:     She has no cervical adenopathy. Neurological: She is alert and oriented to person, place, and time. Skin: Skin is warm. No rash noted. She is not diaphoretic. Psychiatric: Her speech is normal and behavior is normal. Judgment and thought content normal. Her mood appears not anxious. Cognition and memory are normal. She does not exhibit a depressed mood. Nursing note and vitals reviewed. Assessment:      1. Essential hypertension     2. Hyperlipidemia, unspecified hyperlipidemia type     3. Mild intermittent asthma without complication     4. Chronic seasonal allergic rhinitis due to pollen     5. KRISHNA on CPAP     6. Gastroesophageal reflux disease, esophagitis presence not specified     7. Moderate single current episode of major depressive disorder ([address]     8. Anxiety     9. Restless leg syndrome     10. Spondylolisthesis, unspecified spinal region     11. S/P lumbar fusion     12. Type 2 diabetes mellitus with stage 3 chronic kidney disease, with long-term current use of insulin ([address]     13. Diabetic nephropathy associated with type 2 diabetes mellitus ([address]     14. Morbid obesity with BMI of 40.0-44.9, adult ([address]     15.  Trochanteric bursitis of left hip             Plan:      Proceed with obtain labs as ordered when due  Recommend continue current medications  Recommend subspecialty follow up as scheduled  Start trazodone in addition to Celexa to improve control of depression and anxiety and help sleep disturbance  Daily exercise and healthy diet encouraged  Weight reduction encouraged  Subspecialty depression, 10-14 = Moderate depression, 15-19 = Moderately severe depression, 20-27 = Severe depression

## 2017-11-04 PROBLEM — F32.1 MODERATE SINGLE CURRENT EPISODE OF MAJOR DEPRESSIVE DISORDER (HCC): Status: ACTIVE | Noted: 2017-11-04

## 2017-11-04 RX ORDER — TIZANIDINE 2 MG/1
2 TABLET ORAL EVERY 8 HOURS PRN
Qty: 270 TABLET | Refills: 1 | Status: SHIPPED | OUTPATIENT
Start: 2017-11-04 | End: 2018-03-27 | Stop reason: SDUPTHER

## 2017-11-04 RX ORDER — OXYCODONE HYDROCHLORIDE AND ACETAMINOPHEN 5; 325 MG/1; MG/1
1 TABLET ORAL DAILY PRN
Qty: 30 TABLET | Refills: 0 | Status: SHIPPED | OUTPATIENT
Start: 2017-11-04 | End: 2018-02-21 | Stop reason: SDUPTHER

## 2017-11-04 RX ORDER — TRAZODONE HYDROCHLORIDE 100 MG/1
100-200 TABLET ORAL NIGHTLY
Qty: 180 TABLET | Refills: 0 | Status: SHIPPED | OUTPATIENT
Start: 2017-11-04 | End: 2018-04-19 | Stop reason: SDUPTHER

## 2017-12-06 DIAGNOSIS — F41.9 ANXIETY: ICD-10-CM

## 2017-12-06 RX ORDER — LORAZEPAM 0.5 MG/1
0.5 TABLET ORAL 2 TIMES DAILY PRN
Qty: 180 TABLET | Refills: 0 | Status: SHIPPED | OUTPATIENT
Start: 2017-12-06 | End: 2018-03-07 | Stop reason: SDUPTHER

## 2017-12-06 NOTE — TELEPHONE ENCOUNTER
LOV 11-3-17  RTO 2-8-18  LRF 9-13-17  OARRS last reviewed 11-4-17 by DR DURHAM    Health Maintenance   Topic Date Due    Diabetic retinal exam  08/08/2017    Diabetic foot exam  11/04/2017    DTaP/Tdap/Td vaccine (1 - Tdap) 08/04/2018 (Originally 2/3/1969)    Breast cancer screen  02/12/2018    Diabetic hemoglobin A1C test  03/24/2018    Lipid screen  09/11/2018    Colon cancer screen colonoscopy  01/23/2024    Zostavax vaccine  Completed    DEXA (modify frequency per FRAX score)  Addressed    Flu vaccine  Completed    Pneumococcal low/med risk  Completed    Hepatitis C screen  Completed             (applicable per patient's age: Cancer Screenings, Depression Screening, Fall Risk Screening, Immunizations)    Hemoglobin A1C (%)   Date Value   03/24/2017 7.1   11/04/2016 5.9   06/03/2016 6.30     Microalb/Crt.  Ratio (mcg/mg creat)   Date Value   03/21/2017 20     LDL Cholesterol (mg/dL)   Date Value   09/11/2017 76     LDL Calculated (mg/dL)   Date Value   07/29/2013 82     AST (U/L)   Date Value   09/11/2017 17     ALT (U/L)   Date Value   09/11/2017 17     BUN (mg/dL)   Date Value   09/11/2017 28 (H)      (goal A1C is < 7)   (goal LDL is <100) need 30-50% reduction from baseline     BP Readings from Last 3 Encounters:   11/03/17 124/70   09/13/17 121/76   08/04/17 124/72    (goal /80)      All Future Testing planned in [facility]:  Lab Frequency Next Occurrence   Lipid Panel Once 12/13/2017   Comprehensive Metabolic Panel Once 33/71/2088   CBC Once 09/14/2017   Ferritin Once 09/14/2017   Iron and TIBC Once 09/14/2017       Next Visit Date:  Future Appointments  Date Time Provider Eva Dent   2/8/2018 1:00 PM MD Navneet Henry [facility]            Patient Active Problem List:     Restless leg syndrome     KRISHNA on CPAP     Diabetes mellitus type 2 with complications ([address]     Hyperlipidemia with target LDL less than 100     Asthma     Allergic rhinitis     Essential hypertension     Major depression     Anxiety     Spondylisthesis     S/P lumbar fusion     Gastroesophageal reflux disease     Diabetic nephropathy associated with type 2 diabetes mellitus (HCC)     Moderate single current episode of major depressive disorder ([address]

## 2018-01-18 ENCOUNTER — HOSPITAL ENCOUNTER (OUTPATIENT)
Age: 68
Setting detail: SPECIMEN
Discharge: HOME | End: 2018-01-18
Payer: MEDICARE

## 2018-01-18 DIAGNOSIS — E11.8 TYPE 2 DIABETES MELLITUS WITH COMPLICATION, WITH LONG-TERM CURRENT USE OF INSULIN (HCC): ICD-10-CM

## 2018-01-18 DIAGNOSIS — Z79.4 TYPE 2 DIABETES MELLITUS WITH COMPLICATION, WITH LONG-TERM CURRENT USE OF INSULIN (HCC): ICD-10-CM

## 2018-01-18 DIAGNOSIS — E78.6 LOW HDL (UNDER 40): ICD-10-CM

## 2018-01-18 DIAGNOSIS — D64.9 LOW HEMOGLOBIN AND LOW HEMATOCRIT: ICD-10-CM

## 2018-01-18 DIAGNOSIS — E78.2 ELEVATED TRIGLYCERIDES WITH HIGH CHOLESTEROL: ICD-10-CM

## 2018-01-18 DIAGNOSIS — E78.5 HYPERLIPIDEMIA WITH TARGET LDL LESS THAN 100: ICD-10-CM

## 2018-01-18 DIAGNOSIS — I10 ESSENTIAL HYPERTENSION: ICD-10-CM

## 2018-01-18 LAB
ALBUMIN SERPL-MCNC: 3.9 G/DL (ref 3.5–5.2)
ALBUMIN/GLOBULIN RATIO: 1.5 (ref 1–2.5)
ALP BLD-CCNC: 175 U/L (ref 35–104)
ALT SERPL-CCNC: 16 U/L (ref 5–33)
ANION GAP SERPL CALCULATED.3IONS-SCNC: 15 MMOL/L (ref 9–17)
AST SERPL-CCNC: 14 U/L
BILIRUB SERPL-MCNC: 0.29 MG/DL (ref 0.3–1.2)
BUN BLDV-MCNC: 34 MG/DL (ref 8–23)
BUN/CREAT BLD: ABNORMAL (ref 9–20)
CALCIUM SERPL-MCNC: 9.3 MG/DL (ref 8.6–10.4)
CHLORIDE BLD-SCNC: 101 MMOL/L (ref 98–107)
CHOLESTEROL/HDL RATIO: 3.2
CHOLESTEROL: 144 MG/DL
CO2: 21 MMOL/L (ref 20–31)
CREAT SERPL-MCNC: 1.32 MG/DL (ref 0.5–0.9)
FERRITIN: 76 UG/L (ref 13–150)
GFR AFRICAN AMERICAN: 48 ML/MIN
GFR NON-AFRICAN AMERICAN: 40 ML/MIN
GFR SERPL CREATININE-BSD FRML MDRD: ABNORMAL ML/MIN/{1.73_M2}
GFR SERPL CREATININE-BSD FRML MDRD: ABNORMAL ML/MIN/{1.73_M2}
GLUCOSE BLD-MCNC: 144 MG/DL (ref 70–99)
HCT VFR BLD CALC: 35 % (ref 36.3–47.1)
HDLC SERPL-MCNC: 45 MG/DL
HEMOGLOBIN: 10.9 G/DL (ref 11.9–15.1)
IRON SATURATION: 15 % (ref 20–55)
IRON: 47 UG/DL (ref 37–145)
LDL CHOLESTEROL: 71 MG/DL (ref 0–130)
MCH RBC QN AUTO: 27.9 PG (ref 25.2–33.5)
MCHC RBC AUTO-ENTMCNC: 31.1 G/DL (ref 28.4–34.8)
MCV RBC AUTO: 89.7 FL (ref 82.6–102.9)
NRBC AUTOMATED: 0 PER 100 WBC
PDW BLD-RTO: 13.8 % (ref 11.8–14.4)
PLATELET # BLD: 205 K/UL (ref 138–453)
PMV BLD AUTO: 9.8 FL (ref 8.1–13.5)
POTASSIUM SERPL-SCNC: 4.7 MMOL/L (ref 3.7–5.3)
RBC # BLD: 3.9 M/UL (ref 3.95–5.11)
SODIUM BLD-SCNC: 137 MMOL/L (ref 135–144)
TOTAL IRON BINDING CAPACITY: 319 UG/DL (ref 250–450)
TOTAL PROTEIN: 6.5 G/DL (ref 6.4–8.3)
TRIGL SERPL-MCNC: 138 MG/DL
UNSATURATED IRON BINDING CAPACITY: 272 UG/DL (ref 112–347)
VLDLC SERPL CALC-MCNC: NORMAL MG/DL (ref 1–30)
WBC # BLD: 7.1 K/UL (ref 3.5–11.3)

## 2018-01-22 DIAGNOSIS — E11.8 TYPE 2 DIABETES MELLITUS WITH COMPLICATION, WITH LONG-TERM CURRENT USE OF INSULIN (HCC): Primary | ICD-10-CM

## 2018-01-22 DIAGNOSIS — D64.9 LOW HEMOGLOBIN AND LOW HEMATOCRIT: ICD-10-CM

## 2018-01-22 DIAGNOSIS — Z79.4 TYPE 2 DIABETES MELLITUS WITH COMPLICATION, WITH LONG-TERM CURRENT USE OF INSULIN (HCC): Primary | ICD-10-CM

## 2018-01-22 DIAGNOSIS — E78.5 HYPERLIPIDEMIA WITH TARGET LDL LESS THAN 100: ICD-10-CM

## 2018-01-22 DIAGNOSIS — I10 ESSENTIAL HYPERTENSION: ICD-10-CM

## 2018-02-15 LAB
AVERAGE GLUCOSE: NORMAL
GLUCOSE BLD-MCNC: 204 MG/DL
HBA1C MFR BLD: 6.7 %

## 2018-02-21 DIAGNOSIS — M43.10 SPONDYLOLISTHESIS, UNSPECIFIED SPINAL REGION: ICD-10-CM

## 2018-02-21 DIAGNOSIS — Z98.1 S/P LUMBAR FUSION: Primary | ICD-10-CM

## 2018-02-21 RX ORDER — OXYCODONE HYDROCHLORIDE AND ACETAMINOPHEN 5; 325 MG/1; MG/1
1 TABLET ORAL DAILY PRN
Qty: 30 TABLET | Refills: 0 | Status: SHIPPED | OUTPATIENT
Start: 2018-02-21 | End: 2018-06-13 | Stop reason: SDUPTHER

## 2018-02-21 NOTE — TELEPHONE ENCOUNTER
Hyperlipidemia with target LDL less than 100     Asthma     Allergic rhinitis     Essential hypertension     Major depression     Anxiety     Spondylisthesis     S/P lumbar fusion     Gastroesophageal reflux disease     Diabetic nephropathy associated with type 2 diabetes mellitus (HCC)     Moderate single current episode of major depressive disorder ([address]

## 2018-03-07 ENCOUNTER — OFFICE VISIT (OUTPATIENT)
Dept: FAMILY MEDICINE CLINIC | Age: 68
End: 2018-03-07
Payer: MEDICARE

## 2018-03-07 ENCOUNTER — HOSPITAL ENCOUNTER (OUTPATIENT)
Age: 68
Setting detail: SPECIMEN
Discharge: HOME | End: 2018-03-07
Payer: MEDICARE

## 2018-03-07 VITALS
DIASTOLIC BLOOD PRESSURE: 70 MMHG | TEMPERATURE: 98.7 F | WEIGHT: 242 LBS | HEART RATE: 72 BPM | SYSTOLIC BLOOD PRESSURE: 118 MMHG | RESPIRATION RATE: 16 BRPM | BODY MASS INDEX: 42.88 KG/M2

## 2018-03-07 DIAGNOSIS — R39.15 URINARY URGENCY: ICD-10-CM

## 2018-03-07 DIAGNOSIS — E78.5 HYPERLIPIDEMIA WITH TARGET LDL LESS THAN 100: ICD-10-CM

## 2018-03-07 DIAGNOSIS — E11.21 DIABETIC NEPHROPATHY ASSOCIATED WITH TYPE 2 DIABETES MELLITUS (HCC): ICD-10-CM

## 2018-03-07 DIAGNOSIS — Z99.89 OSA ON CPAP: ICD-10-CM

## 2018-03-07 DIAGNOSIS — E66.01 MORBID OBESITY WITH BMI OF 40.0-44.9, ADULT (HCC): ICD-10-CM

## 2018-03-07 DIAGNOSIS — I10 ESSENTIAL HYPERTENSION: Primary | ICD-10-CM

## 2018-03-07 DIAGNOSIS — Z98.1 S/P LUMBAR FUSION: ICD-10-CM

## 2018-03-07 DIAGNOSIS — K21.9 GASTROESOPHAGEAL REFLUX DISEASE, ESOPHAGITIS PRESENCE NOT SPECIFIED: ICD-10-CM

## 2018-03-07 DIAGNOSIS — N18.30 TYPE 2 DIABETES MELLITUS WITH STAGE 3 CHRONIC KIDNEY DISEASE, WITH LONG-TERM CURRENT USE OF INSULIN (HCC): ICD-10-CM

## 2018-03-07 DIAGNOSIS — G47.33 OSA ON CPAP: ICD-10-CM

## 2018-03-07 DIAGNOSIS — B37.9 YEAST INFECTION: ICD-10-CM

## 2018-03-07 DIAGNOSIS — R35.0 URINARY FREQUENCY: ICD-10-CM

## 2018-03-07 DIAGNOSIS — F41.9 ANXIETY: ICD-10-CM

## 2018-03-07 DIAGNOSIS — G25.81 RESTLESS LEG SYNDROME: ICD-10-CM

## 2018-03-07 DIAGNOSIS — J30.1 CHRONIC SEASONAL ALLERGIC RHINITIS DUE TO POLLEN: ICD-10-CM

## 2018-03-07 DIAGNOSIS — Z79.4 TYPE 2 DIABETES MELLITUS WITH STAGE 3 CHRONIC KIDNEY DISEASE, WITH LONG-TERM CURRENT USE OF INSULIN (HCC): ICD-10-CM

## 2018-03-07 DIAGNOSIS — F32.1 MODERATE SINGLE CURRENT EPISODE OF MAJOR DEPRESSIVE DISORDER (HCC): ICD-10-CM

## 2018-03-07 DIAGNOSIS — M43.10 SPONDYLOLISTHESIS, UNSPECIFIED SPINAL REGION: ICD-10-CM

## 2018-03-07 DIAGNOSIS — E11.22 TYPE 2 DIABETES MELLITUS WITH STAGE 3 CHRONIC KIDNEY DISEASE, WITH LONG-TERM CURRENT USE OF INSULIN (HCC): ICD-10-CM

## 2018-03-07 DIAGNOSIS — G47.31 CENTRAL SLEEP APNEA: ICD-10-CM

## 2018-03-07 DIAGNOSIS — J45.20 MILD INTERMITTENT ASTHMA WITHOUT COMPLICATION: ICD-10-CM

## 2018-03-07 DIAGNOSIS — K58.0 IRRITABLE BOWEL SYNDROME WITH DIARRHEA: ICD-10-CM

## 2018-03-07 LAB
-: NORMAL
AMORPHOUS: NORMAL
BACTERIA: NORMAL
BILIRUBIN URINE: NEGATIVE
CASTS UA: NORMAL /LPF (ref 0–8)
COLOR: YELLOW
COMMENT UA: ABNORMAL
CRYSTALS, UA: NORMAL /HPF
EPITHELIAL CELLS UA: NORMAL /HPF (ref 0–5)
GLUCOSE URINE: ABNORMAL
KETONES, URINE: NEGATIVE
LEUKOCYTE ESTERASE, URINE: NEGATIVE
MUCUS: NORMAL
NITRITE, URINE: NEGATIVE
OTHER OBSERVATIONS UA: NORMAL
PH UA: 5.5 (ref 5–8)
PROTEIN UA: ABNORMAL
RBC UA: NORMAL /HPF (ref 0–4)
RENAL EPITHELIAL, UA: NORMAL /HPF
SPECIFIC GRAVITY UA: 1.02 (ref 1–1.03)
TRICHOMONAS: NORMAL
TURBIDITY: CLEAR
URINE HGB: NEGATIVE
UROBILINOGEN, URINE: NORMAL
WBC UA: NORMAL /HPF (ref 0–5)
YEAST: NORMAL

## 2018-03-07 PROCEDURE — G8482 FLU IMMUNIZE ORDER/ADMIN: HCPCS | Performed by: PEDIATRICS

## 2018-03-07 PROCEDURE — 1123F ACP DISCUSS/DSCN MKR DOCD: CPT | Performed by: PEDIATRICS

## 2018-03-07 PROCEDURE — 3044F HG A1C LEVEL LT 7.0%: CPT | Performed by: PEDIATRICS

## 2018-03-07 PROCEDURE — 3288F FALL RISK ASSESSMENT DOCD: CPT | Performed by: PEDIATRICS

## 2018-03-07 PROCEDURE — 3017F COLORECTAL CA SCREEN DOC REV: CPT | Performed by: PEDIATRICS

## 2018-03-07 PROCEDURE — 4040F PNEUMOC VAC/ADMIN/RCVD: CPT | Performed by: PEDIATRICS

## 2018-03-07 PROCEDURE — 1090F PRES/ABSN URINE INCON ASSESS: CPT | Performed by: PEDIATRICS

## 2018-03-07 PROCEDURE — 99214 OFFICE O/P EST MOD 30 MIN: CPT | Performed by: PEDIATRICS

## 2018-03-07 PROCEDURE — 1036F TOBACCO NON-USER: CPT | Performed by: PEDIATRICS

## 2018-03-07 PROCEDURE — G8399 PT W/DXA RESULTS DOCUMENT: HCPCS | Performed by: PEDIATRICS

## 2018-03-07 PROCEDURE — G8417 CALC BMI ABV UP PARAM F/U: HCPCS | Performed by: PEDIATRICS

## 2018-03-07 PROCEDURE — 3014F SCREEN MAMMO DOC REV: CPT | Performed by: PEDIATRICS

## 2018-03-07 PROCEDURE — G8427 DOCREV CUR MEDS BY ELIG CLIN: HCPCS | Performed by: PEDIATRICS

## 2018-03-07 RX ORDER — SACCHAROMYCES BOULARDII 250 MG
250 CAPSULE ORAL DAILY
Qty: 30 CAPSULE | Refills: 5 | Status: SHIPPED | OUTPATIENT
Start: 2018-03-07 | End: 2018-04-06

## 2018-03-07 ASSESSMENT — ENCOUNTER SYMPTOMS
TROUBLE SWALLOWING: 0
SINUS PRESSURE: 0
ORTHOPNEA: 0
GLOBUS SENSATION: 0
SORE THROAT: 0
BACK PAIN: 1
VOMITING: 0
ABDOMINAL PAIN: 0
EYE PAIN: 0
SHORTNESS OF BREATH: 0
NAUSEA: 0
CHOKING: 0
EYE REDNESS: 0
BELCHING: 0
COLOR CHANGE: 0
WHEEZING: 0
COUGH: 0
CONSTIPATION: 0
BLOOD IN STOOL: 0
STRIDOR: 0
WATER BRASH: 0
BLURRED VISION: 0
EYE DISCHARGE: 0
RHINORRHEA: 0

## 2018-03-07 NOTE — PROGRESS NOTES
Completed    Hepatitis C screen  Completed       HPI:      Patient presents today for routine follow up of her chronic medical problems including hypertension, hyperlipidemia, asthma, allergies, KRISHNA, central sleep apnea, restless leg syndrome, GERD, depression, anxiety, spondylolisthesis s/p lumbar fusion. Her blood pressure is well controlled on losartan. She is taking and tolerating lipitor for cholesterol without side effects. Her asthma and allergies are well controlled with singulair. Her KRISHNA is treated with CPAP and she is using this regularly but she tells me she had to see cardiology for further testing to evaluate her EF because her pulmonologist, Dr. Rachel Kenny puts her on a new machine for her central sleep apnea. She has to have an EF of greater than 55 to use this. Her GERD is controlled with prilosec. She states her depression and anxiety is well controlled with celexa daily and ativan as needed. Her restless leg is stable with neurontin. She also underwent a posterior lumbar interbody fusion of L4/L5 due to spondylolisthesis in 2016. She does have low back pain intermittently. She is currently taking Percocet only as needed and Neurontin and tizanidine twice daily. This combination of medications is controlling her back pain very well. She tells me that her back does hurt - only sometimes at night. She also complains of some left outer leg electrical shocks that occur occasionally. Her diabetes type 2 with nephropathy is followed by her endocrinologist, Dr. Luisa Geller. She takes insulin 70/30 - 25 unnits in the am and 22 units at night (if her sugar is under 115 at night she will only take 12 units). Her eye exam has to be scheduled. She does have obesity and is trying to work on her weight. She only complains of right shoulder pain still because she has not been able to do therapy.  She does have some left hip pain at the end of the day. This seems to be on the lateral side of the left hip.   She Heidy Judy. She was now diagnosed with central apnea along with her KRISHNA and is awaiting approval for a new ventilatory machine. Her EF had to be greater than 55% and she reports she was told it was 35%. We did clarify with cardiology that this in fact was greater than 55%. Cardiovascular: Negative for chest pain, palpitations, orthopnea and leg swelling. Gastrointestinal: Positive for diarrhea (secondary to irritable bowel - improved with radha colon health). Negative for abdominal pain, blood in stool, constipation, dysphagia, melena, nausea and vomiting. GERD controlled with omeprazole and zantac   Endocrine: Negative for polydipsia, polyphagia and polyuria. Genitourinary: Positive for frequency and urgency. Negative for dysuria and pelvic pain. Musculoskeletal: Positive for arthralgias (right shoulder pain chronic and now with left lateral hip pain) and back pain (Mid back pain occasionally and right hip pain). Negative for myalgias, muscle weakness and neck pain. Left hip pain   Skin: Positive for rash (she does get a yeast infection in the groin folds and she uses a homemade cream combination for this). Negative for color change and wound. Allergic/Immunologic: Negative for immunocompromised state. Neurological: Positive for numbness. Negative for dizziness, tremors, focal weakness, weakness, light-headedness and headaches. Hematological: Negative for adenopathy. Does not bruise/bleed easily. History of mild anemia   Psychiatric/Behavioral: Positive for dysphoric mood (stable) and sleep disturbance (better with elavil however this makes her very fatigued all day). Negative for agitation, confusion and decreased concentration. Nervous/anxious: stable. Objective:   Physical Exam   Constitutional: She is oriented to person, place, and time. She appears well-developed and well-nourished. No distress. obese   HENT:   Head: Normocephalic.    Right Ear: External ear region     12. S/P lumbar fusion     13. Type 2 diabetes mellitus with stage 3 chronic kidney disease, with long-term current use of insulin ([facility]     14. Diabetic nephropathy associated with type 2 diabetes mellitus ([facility]     15. Morbid obesity with BMI of 40.0-44.9, adult ([facility]     16. Irritable bowel syndrome with diarrhea  saccharomyces boulardii (FLORASTOR) 250 MG capsule   17. Urinary urgency  UA W/REFLEX CULTURE   18. Urinary frequency  UA W/REFLEX CULTURE   19. Yeast infection         Quality & Risk Score Accuracy - MEDICARE ADVANTAGE    Visit Dx:  Diabetic nephropathy associated with type 2 diabetes mellitus ([facility]  Stable Diabetes type 2 and complications based on lab values and symptoms. Continue present treatment plan, control of risk factors, and recheck at least yearly. Visit Dx: Moderate single current episode of major depressive disorder ([facility]  Stable/controlled based upon review of recent symptoms. Continue current treatment plan and follow up at least yearly. Visit Dx:  Morbid obesity with BMI of 40.0-44.9, adult ([facility]  Stable on review of most recent BMI. Patient counseled on diet and exercise. Additional documentation:  Based on review of the following: The BMI is 42.88 kg/m2. Last edited 03/11/18 10:06 EDT by Manny Ha MD               Plan:      Proceed with obtain labs as ordered when due  Recommend continue current medications  Recommend subspecialty follow up as scheduled  Daily exercise and healthy diet encouraged  Weight reduction encouraged  Eye exam yearly / yearly foot exam  Continue Atterocor  Obtain u/a w/ reflex culture   Call with concerns    Alvena Babinski received counseling on the following healthy behaviors: nutrition, exercise and medication adherence  Reviewed prior labs and health maintenance  Continue current medications, diet and exercise. Discussed use, benefit, and side effects of prescribed medications.   Barriers to medication compliance

## 2018-03-08 RX ORDER — LORAZEPAM 0.5 MG/1
0.5 TABLET ORAL 2 TIMES DAILY PRN
Qty: 180 TABLET | Refills: 0 | Status: SHIPPED | OUTPATIENT
Start: 2018-03-08 | End: 2018-06-30 | Stop reason: SDUPTHER

## 2018-03-08 NOTE — TELEPHONE ENCOUNTER
Last refill was 12/6/2017 #180-0  Last visit was 3/7/2018   Health Maintenance   Topic Date Due    Shingles Vaccine (1 of 2 - 2 Dose Series) 02/03/1998    Diabetic retinal exam  08/08/2017    Diabetic foot exam  11/04/2017    Breast cancer screen  02/12/2018    DTaP/Tdap/Td vaccine (1 - Tdap) 08/04/2018 (Originally 2/3/1969)    A1C test (Diabetic or Prediabetic)  03/24/2018    Lipid screen  01/18/2019    Potassium monitoring  01/18/2019    Creatinine monitoring  01/18/2019    Colon cancer screen colonoscopy  01/23/2024    DEXA (modify frequency per FRAX score)  Addressed    Flu vaccine  Completed    Pneumococcal low/med risk  Completed    Hepatitis C screen  Completed             (applicable per patient's age: Cancer Screenings, Depression Screening, Fall Risk Screening, Immunizations)    Hemoglobin A1C (%)   Date Value   03/24/2017 7.1   11/04/2016 5.9   06/03/2016 6.30     Microalb/Crt.  Ratio (mcg/mg creat)   Date Value   03/21/2017 20     LDL Cholesterol (mg/dL)   Date Value   01/18/2018 71     LDL Calculated (mg/dL)   Date Value   07/29/2013 82     AST (U/L)   Date Value   01/18/2018 14     ALT (U/L)   Date Value   01/18/2018 16     BUN (mg/dL)   Date Value   01/18/2018 34 (H)      (goal A1C is < 7)   (goal LDL is <100) need 30-50% reduction from baseline     BP Readings from Last 3 Encounters:   03/07/18 118/70   11/03/17 124/70   09/13/17 121/76    (goal /80)      All Future Testing planned in [facility]:  Lab Frequency Next Occurrence   Comprehensive Metabolic Panel Once 14/28/0990   Microalbumin, Ur Once 04/22/2018   CBC Once 04/22/2018   Iron and TIBC Once 04/22/2018   Ferritin Once 04/22/2018   Hemoglobin A1C Once 04/22/2018       Next Visit Date:  Future Appointments  Date Time Provider Eva Dent   6/13/2018 1:00 PM [facility] [address]            Patient Active Problem List:     Restless leg syndrome     KRISHNA on CPAP     Diabetes mellitus type 2 with

## 2018-03-09 DIAGNOSIS — F41.9 ANXIETY: ICD-10-CM

## 2018-03-09 DIAGNOSIS — E78.5 HYPERLIPIDEMIA, UNSPECIFIED HYPERLIPIDEMIA TYPE: ICD-10-CM

## 2018-03-09 DIAGNOSIS — K21.9 GASTROESOPHAGEAL REFLUX DISEASE, ESOPHAGITIS PRESENCE NOT SPECIFIED: ICD-10-CM

## 2018-03-09 RX ORDER — ATORVASTATIN CALCIUM 80 MG/1
80 TABLET, FILM COATED ORAL DAILY
Qty: 90 TABLET | Refills: 1 | Status: SHIPPED | OUTPATIENT
Start: 2018-03-09 | End: 2018-07-23 | Stop reason: SDUPTHER

## 2018-03-09 RX ORDER — RANITIDINE 150 MG/1
150 TABLET ORAL NIGHTLY
Qty: 90 TABLET | Refills: 1 | Status: SHIPPED | OUTPATIENT
Start: 2018-03-09 | End: 2018-07-23 | Stop reason: SDUPTHER

## 2018-03-09 RX ORDER — CITALOPRAM 40 MG/1
40 TABLET ORAL DAILY
Qty: 90 TABLET | Refills: 1 | Status: SHIPPED | OUTPATIENT
Start: 2018-03-09 | End: 2018-07-23 | Stop reason: SDUPTHER

## 2018-03-11 ASSESSMENT — ENCOUNTER SYMPTOMS: DIARRHEA: 1

## 2018-03-15 RX ORDER — LOSARTAN POTASSIUM 100 MG/1
100 TABLET ORAL DAILY
Qty: 90 TABLET | Refills: 1 | Status: SHIPPED | OUTPATIENT
Start: 2018-03-15 | End: 2018-04-09 | Stop reason: SDUPTHER

## 2018-04-09 RX ORDER — LOSARTAN POTASSIUM 100 MG/1
100 TABLET ORAL DAILY
Qty: 14 TABLET | Refills: 0 | Status: SHIPPED | OUTPATIENT
Start: 2018-04-09 | End: 2018-06-13 | Stop reason: SDUPTHER

## 2018-04-09 RX ORDER — LOSARTAN POTASSIUM 100 MG/1
100 TABLET ORAL DAILY
Qty: 90 TABLET | Refills: 1 | Status: SHIPPED | OUTPATIENT
Start: 2018-04-09 | End: 2018-10-01 | Stop reason: SDUPTHER

## 2018-04-20 RX ORDER — TRAZODONE HYDROCHLORIDE 100 MG/1
TABLET ORAL
Qty: 180 TABLET | Refills: 1 | Status: SHIPPED | OUTPATIENT
Start: 2018-04-20 | End: 2018-08-27 | Stop reason: SDUPTHER

## 2018-05-04 RX ORDER — GABAPENTIN 300 MG/1
300 CAPSULE ORAL 3 TIMES DAILY
Qty: 270 CAPSULE | Refills: 1 | Status: SHIPPED | OUTPATIENT
Start: 2018-05-04 | End: 2018-12-09 | Stop reason: SDUPTHER

## 2018-06-13 ENCOUNTER — OFFICE VISIT (OUTPATIENT)
Dept: FAMILY MEDICINE CLINIC | Age: 68
End: 2018-06-13
Payer: MEDICARE

## 2018-06-13 VITALS
BODY MASS INDEX: 42.14 KG/M2 | TEMPERATURE: 97.7 F | DIASTOLIC BLOOD PRESSURE: 80 MMHG | RESPIRATION RATE: 20 BRPM | SYSTOLIC BLOOD PRESSURE: 122 MMHG | WEIGHT: 237.8 LBS | HEART RATE: 68 BPM

## 2018-06-13 DIAGNOSIS — E66.01 MORBID OBESITY WITH BMI OF 40.0-44.9, ADULT (HCC): ICD-10-CM

## 2018-06-13 DIAGNOSIS — E11.21 DIABETIC NEPHROPATHY ASSOCIATED WITH TYPE 2 DIABETES MELLITUS (HCC): ICD-10-CM

## 2018-06-13 DIAGNOSIS — F32.1 MODERATE SINGLE CURRENT EPISODE OF MAJOR DEPRESSIVE DISORDER (HCC): ICD-10-CM

## 2018-06-13 DIAGNOSIS — G47.33 OSA ON CPAP: ICD-10-CM

## 2018-06-13 DIAGNOSIS — K21.9 GASTROESOPHAGEAL REFLUX DISEASE, ESOPHAGITIS PRESENCE NOT SPECIFIED: ICD-10-CM

## 2018-06-13 DIAGNOSIS — G47.31 CENTRAL SLEEP APNEA: ICD-10-CM

## 2018-06-13 DIAGNOSIS — M43.10 SPONDYLOLISTHESIS, UNSPECIFIED SPINAL REGION: ICD-10-CM

## 2018-06-13 DIAGNOSIS — G89.29 CHRONIC BILATERAL THORACIC BACK PAIN: ICD-10-CM

## 2018-06-13 DIAGNOSIS — Z99.89 OSA ON CPAP: ICD-10-CM

## 2018-06-13 DIAGNOSIS — F41.9 ANXIETY: ICD-10-CM

## 2018-06-13 DIAGNOSIS — E78.5 HYPERLIPIDEMIA WITH TARGET LDL LESS THAN 100: ICD-10-CM

## 2018-06-13 DIAGNOSIS — N18.30 TYPE 2 DIABETES MELLITUS WITH STAGE 3 CHRONIC KIDNEY DISEASE, WITH LONG-TERM CURRENT USE OF INSULIN (HCC): ICD-10-CM

## 2018-06-13 DIAGNOSIS — E11.22 TYPE 2 DIABETES MELLITUS WITH STAGE 3 CHRONIC KIDNEY DISEASE, WITH LONG-TERM CURRENT USE OF INSULIN (HCC): ICD-10-CM

## 2018-06-13 DIAGNOSIS — M54.6 CHRONIC BILATERAL THORACIC BACK PAIN: ICD-10-CM

## 2018-06-13 DIAGNOSIS — J45.20 MILD INTERMITTENT ASTHMA WITHOUT COMPLICATION: ICD-10-CM

## 2018-06-13 DIAGNOSIS — J30.1 CHRONIC SEASONAL ALLERGIC RHINITIS DUE TO POLLEN: ICD-10-CM

## 2018-06-13 DIAGNOSIS — I10 ESSENTIAL HYPERTENSION: Primary | ICD-10-CM

## 2018-06-13 DIAGNOSIS — Z23 NEED FOR TDAP VACCINATION: ICD-10-CM

## 2018-06-13 DIAGNOSIS — Z98.1 S/P LUMBAR FUSION: ICD-10-CM

## 2018-06-13 DIAGNOSIS — K58.0 IRRITABLE BOWEL SYNDROME WITH DIARRHEA: ICD-10-CM

## 2018-06-13 DIAGNOSIS — Z79.4 TYPE 2 DIABETES MELLITUS WITH STAGE 3 CHRONIC KIDNEY DISEASE, WITH LONG-TERM CURRENT USE OF INSULIN (HCC): ICD-10-CM

## 2018-06-13 DIAGNOSIS — G25.81 RESTLESS LEG SYNDROME: ICD-10-CM

## 2018-06-13 PROCEDURE — 1123F ACP DISCUSS/DSCN MKR DOCD: CPT | Performed by: PEDIATRICS

## 2018-06-13 PROCEDURE — 3017F COLORECTAL CA SCREEN DOC REV: CPT | Performed by: PEDIATRICS

## 2018-06-13 PROCEDURE — 99214 OFFICE O/P EST MOD 30 MIN: CPT | Performed by: PEDIATRICS

## 2018-06-13 PROCEDURE — 2022F DILAT RTA XM EVC RTNOPTHY: CPT | Performed by: PEDIATRICS

## 2018-06-13 PROCEDURE — 1090F PRES/ABSN URINE INCON ASSESS: CPT | Performed by: PEDIATRICS

## 2018-06-13 PROCEDURE — 3044F HG A1C LEVEL LT 7.0%: CPT | Performed by: PEDIATRICS

## 2018-06-13 PROCEDURE — G8427 DOCREV CUR MEDS BY ELIG CLIN: HCPCS | Performed by: PEDIATRICS

## 2018-06-13 PROCEDURE — G8399 PT W/DXA RESULTS DOCUMENT: HCPCS | Performed by: PEDIATRICS

## 2018-06-13 PROCEDURE — 4040F PNEUMOC VAC/ADMIN/RCVD: CPT | Performed by: PEDIATRICS

## 2018-06-13 PROCEDURE — 1036F TOBACCO NON-USER: CPT | Performed by: PEDIATRICS

## 2018-06-13 PROCEDURE — G8417 CALC BMI ABV UP PARAM F/U: HCPCS | Performed by: PEDIATRICS

## 2018-06-13 ASSESSMENT — ENCOUNTER SYMPTOMS
BLURRED VISION: 0
SINUS PRESSURE: 0
DIARRHEA: 1
RHINORRHEA: 0
EYE DISCHARGE: 0
TROUBLE SWALLOWING: 0
STRIDOR: 0
COLOR CHANGE: 0
BLOOD IN STOOL: 0
ABDOMINAL PAIN: 0
EYE REDNESS: 0
SORE THROAT: 0
EYE PAIN: 0
BACK PAIN: 1
NAUSEA: 0
SHORTNESS OF BREATH: 0
CHEST TIGHTNESS: 0
COUGH: 0
CHOKING: 0
CONSTIPATION: 0
VISUAL CHANGE: 0
VOMITING: 0
WHEEZING: 0

## 2018-06-14 PROBLEM — E66.01 MORBID OBESITY WITH BMI OF 40.0-44.9, ADULT (HCC): Status: ACTIVE | Noted: 2018-06-14

## 2018-06-14 RX ORDER — OXYCODONE HYDROCHLORIDE AND ACETAMINOPHEN 5; 325 MG/1; MG/1
1 TABLET ORAL DAILY PRN
Qty: 30 TABLET | Refills: 0 | Status: SHIPPED | OUTPATIENT
Start: 2018-06-14 | End: 2018-09-17 | Stop reason: SDUPTHER

## 2018-06-29 LAB
AVERAGE GLUCOSE: NORMAL
HBA1C MFR BLD: 6.7 %

## 2018-06-30 DIAGNOSIS — F41.9 ANXIETY: ICD-10-CM

## 2018-07-02 RX ORDER — LORAZEPAM 0.5 MG/1
0.5 TABLET ORAL 2 TIMES DAILY PRN
Qty: 180 TABLET | Refills: 0 | Status: SHIPPED | OUTPATIENT
Start: 2018-07-02 | End: 2018-10-06 | Stop reason: SDUPTHER

## 2018-07-02 NOTE — TELEPHONE ENCOUNTER
LOV 6-13-18  RTO 9-14-18  LRF 3-8-18  OARRS last reviewed 6-14-18      Health Maintenance   Topic Date Due    Shingles Vaccine (1 of 2 - 2 Dose Series) 02/03/1998    Diabetic retinal exam  08/08/2017    DTaP/Tdap/Td vaccine (1 - Tdap) 08/04/2018 (Originally 2/3/1969)    Breast cancer screen  06/14/2019 (Originally 2/12/2018)    Flu vaccine (1) 09/01/2018    Lipid screen  01/18/2019    Potassium monitoring  01/18/2019    Creatinine monitoring  01/18/2019    Diabetic foot exam  02/15/2019    A1C test (Diabetic or Prediabetic)  02/15/2019    Colon cancer screen colonoscopy  01/23/2024    DEXA (modify frequency per FRAX score)  Addressed    Pneumococcal low/med risk  Completed    Hepatitis C screen  Completed             (applicable per patient's age: Cancer Screenings, Depression Screening, Fall Risk Screening, Immunizations)    Hemoglobin A1C (%)   Date Value   06/29/2018 6.7   02/15/2018 6.7   03/24/2017 7.1     Microalb/Crt.  Ratio (mcg/mg creat)   Date Value   03/21/2017 20     LDL Cholesterol (mg/dL)   Date Value   01/18/2018 71     LDL Calculated (mg/dL)   Date Value   07/29/2013 82     AST (U/L)   Date Value   01/18/2018 14     ALT (U/L)   Date Value   01/18/2018 16     BUN (mg/dL)   Date Value   01/18/2018 34 (H)      (goal A1C is < 7)   (goal LDL is <100) need 30-50% reduction from baseline     BP Readings from Last 3 Encounters:   06/13/18 122/80   03/07/18 118/70   11/03/17 124/70    (goal /80)      All Future Testing planned in [facility]:  Lab Frequency Next Occurrence   Comprehensive Metabolic Panel Once 63/89/7713   Microalbumin, Ur Once 04/22/2018   CBC Once 04/22/2018   Iron and TIBC Once 04/22/2018   Ferritin Once 04/22/2018   Hemoglobin A1C Once 04/22/2018   XR THORACIC SPINE (MIN 4 VIEWS) Once 06/13/2018       Next Visit Date:  Future Appointments  Date Time Provider Eva Dent   9/14/2018 2:20 PM MD Gui Coronel            Patient Active

## 2018-07-06 ENCOUNTER — OFFICE VISIT (OUTPATIENT)
Dept: FAMILY MEDICINE CLINIC | Age: 68
End: 2018-07-06
Payer: MEDICARE

## 2018-07-06 VITALS
WEIGHT: 238 LBS | BODY MASS INDEX: 40.63 KG/M2 | TEMPERATURE: 97.6 F | RESPIRATION RATE: 16 BRPM | HEART RATE: 84 BPM | HEIGHT: 64 IN | DIASTOLIC BLOOD PRESSURE: 74 MMHG | SYSTOLIC BLOOD PRESSURE: 122 MMHG

## 2018-07-06 DIAGNOSIS — J01.40 ACUTE NON-RECURRENT PANSINUSITIS: ICD-10-CM

## 2018-07-06 DIAGNOSIS — S01.20XA: Primary | ICD-10-CM

## 2018-07-06 PROCEDURE — 1101F PT FALLS ASSESS-DOCD LE1/YR: CPT | Performed by: NURSE PRACTITIONER

## 2018-07-06 PROCEDURE — 1123F ACP DISCUSS/DSCN MKR DOCD: CPT | Performed by: NURSE PRACTITIONER

## 2018-07-06 PROCEDURE — 1090F PRES/ABSN URINE INCON ASSESS: CPT | Performed by: NURSE PRACTITIONER

## 2018-07-06 PROCEDURE — 99213 OFFICE O/P EST LOW 20 MIN: CPT | Performed by: NURSE PRACTITIONER

## 2018-07-06 PROCEDURE — 3017F COLORECTAL CA SCREEN DOC REV: CPT | Performed by: NURSE PRACTITIONER

## 2018-07-06 PROCEDURE — 1036F TOBACCO NON-USER: CPT | Performed by: NURSE PRACTITIONER

## 2018-07-06 PROCEDURE — G8399 PT W/DXA RESULTS DOCUMENT: HCPCS | Performed by: NURSE PRACTITIONER

## 2018-07-06 PROCEDURE — 4040F PNEUMOC VAC/ADMIN/RCVD: CPT | Performed by: NURSE PRACTITIONER

## 2018-07-06 PROCEDURE — G8427 DOCREV CUR MEDS BY ELIG CLIN: HCPCS | Performed by: NURSE PRACTITIONER

## 2018-07-06 PROCEDURE — G8417 CALC BMI ABV UP PARAM F/U: HCPCS | Performed by: NURSE PRACTITIONER

## 2018-07-06 RX ORDER — AMOXICILLIN AND CLAVULANATE POTASSIUM 875; 125 MG/1; MG/1
1 TABLET, FILM COATED ORAL 2 TIMES DAILY
Qty: 14 TABLET | Refills: 0 | Status: SHIPPED | OUTPATIENT
Start: 2018-07-06 | End: 2018-07-13

## 2018-07-06 RX ORDER — DULOXETIN HYDROCHLORIDE 20 MG/1
1 CAPSULE, DELAYED RELEASE ORAL 2 TIMES DAILY
COMMUNITY
End: 2018-09-14 | Stop reason: ALTCHOICE

## 2018-07-06 NOTE — PATIENT INSTRUCTIONS
Continue with Bactroban in nares. Okay to use Vaseline as well. Recommend saline spray. Antibiotic as prescribed. Monitor for worsening symptoms. Call office with concerns. Patient Education        Sinusitis: Care Instructions  Your Care Instructions    Sinusitis is an infection of the lining of the sinus cavities in your head. Sinusitis often follows a cold. It causes pain and pressure in your head and face. In most cases, sinusitis gets better on its own in 1 to 2 weeks. But some mild symptoms may last for several weeks. Sometimes antibiotics are needed. Follow-up care is a key part of your treatment and safety. Be sure to make and go to all appointments, and call your doctor if you are having problems. It's also a good idea to know your test results and keep a list of the medicines you take. How can you care for yourself at home? · Take an over-the-counter pain medicine, such as acetaminophen (Tylenol), ibuprofen (Advil, Motrin), or naproxen (Aleve). Read and follow all instructions on the label. · If the doctor prescribed antibiotics, take them as directed. Do not stop taking them just because you feel better. You need to take the full course of antibiotics. · Be careful when taking over-the-counter cold or flu medicines and Tylenol at the same time. Many of these medicines have acetaminophen, which is Tylenol. Read the labels to make sure that you are not taking more than the recommended dose. Too much acetaminophen (Tylenol) can be harmful. · Breathe warm, moist air from a steamy shower, a hot bath, or a sink filled with hot water. Avoid cold, dry air. Using a humidifier in your home may help. Follow the directions for cleaning the machine. · Use saline (saltwater) nasal washes to help keep your nasal passages open and wash out mucus and bacteria. You can buy saline nose drops at a grocery store or drugstore.  Or you can make your own at home by adding 1 teaspoon of salt and 1 teaspoon of baking soda to 2 cups of distilled water. If you make your own, fill a bulb syringe with the solution, insert the tip into your nostril, and squeeze gently. Xochilt Carrero your nose. · Put a hot, wet towel or a warm gel pack on your face 3 or 4 times a day for 5 to 10 minutes each time. · Try a decongestant nasal spray like oxymetazoline (Afrin). Do not use it for more than 3 days in a row. Using it for more than 3 days can make your congestion worse. When should you call for help? Call your doctor now or seek immediate medical care if:    · You have new or worse swelling or redness in your face or around your eyes.     · You have a new or higher fever.    Watch closely for changes in your health, and be sure to contact your doctor if:    · You have new or worse facial pain.     · The mucus from your nose becomes thicker (like pus) or has new blood in it.     · You are not getting better as expected. Where can you learn more? Go to [email or web address] org and sign in to your Zoomingo account. Enter Y563 in the Bibulu box to learn more about \"Sinusitis: Care Instructions. \"     If you do not have an account, please click on the \"Sign Up Now\" link. Current as of: May 12, 2017  Content Version: 11.6  © 0090-2629 [employer]. Care instructions adapted under license by [facility] ([facility]). If you have questions about a medical condition or this instruction, always ask your healthcare professional. [place] [address] any warranty or liability for your use of this information.

## 2018-07-06 NOTE — PROGRESS NOTES
Subjective:      Patient ID: Mattie Mckeon is a 77 y.o. female. Visit Information    Have you changed or started any medications since your last visit including any over-the-counter medicines, vitamins, or herbal medicines? no   Are you having any side effects from any of your medications? -  no  Have you stopped taking any of your medications? Is so, why? -  no    Have you seen any other physician or provider since your last visit? Yes - Records Obtained  Have you had any other diagnostic tests since your last visit? Yes - Records Obtained  Have you been seen in the emergency room and/or had an admission to a hospital since we last saw you? No  Have you had your routine dental cleaning in the past 6 months? no    Have you activated your [id number] account? If not, what are your barriers?  Yes     Patient Care Team:  Abdullahi Ann MD as PCP - General (Internal Medicine)  Abdullahi Ann MD as PCP - S Attributed Provider  Lenora Arango MD as Consulting Physician (Nephrology)  Imelda Bonner MD as Consulting Physician (Pulmonology)  Tisha Cockayne, MD Jossie Maiden, MD as Consulting Physician (Endocrinology)  Alicia Peraza MD as Consulting Physician (Gastroenterology)    Medical History Review  Past Medical, Family, and Social History reviewed and does not contribute to the patient presenting condition    Health Maintenance   Topic Date Due    Shingles Vaccine (1 of 2 - 2 Dose Series) 02/03/1998    Diabetic retinal exam  08/08/2017    DTaP/Tdap/Td vaccine (1 - Tdap) 08/04/2018 (Originally 2[birth year]3/1969)    Breast cancer screen  06/14/2019 (Originally 2/12/2018)    Flu vaccine (1) 09/01/2018    Lipid screen  01/18/2019    Potassium monitoring  01/18/2019    Creatinine monitoring  01/18/2019    Diabetic foot exam  06/29/2019    A1C test (Diabetic or Prediabetic)  06/29/2019    Colon cancer screen colonoscopy  01/23/2024    DEXA (modify frequency per FRAX score)  Addressed    Pneumococcal low/med risk  Completed    Hepatitis C screen  Completed     /74 (Site: Right Arm, Position: Sitting, Cuff Size: Large Adult)   Pulse 84   Temp 97.6 °F (36.4 °C) (Tympanic)   Resp 16   Ht 5' 3.5\" (1.613 m)   Wt 238 lb (108 kg)   BMI 41.50 kg/m²      PHQ Scores 8/4/2017   PHQ2 Score 2   PHQ9 Score 2     Interpretation of Total Score Depression Severity: 1-4 = Minimal depression, 5-9 = Mild depression, 10-14 = Moderate depression, 15-19 = Moderately severe depression, 20-27 = Severe depression    Current Outpatient Prescriptions   Medication Sig Dispense Refill    LORazepam (ATIVAN) 0.5 MG tablet Take 1 tablet by mouth 2 times daily as needed for Anxiety. . 180 tablet 0    oxyCODONE-acetaminophen (PERCOCET) 5-325 MG per tablet Take 1 tablet by mouth daily as needed for Pain. . 30 tablet 0    gabapentin (NEURONTIN) 300 MG capsule Take 1 capsule by mouth 3 times daily. . 270 capsule 1    traZODone (DESYREL) 100 MG tablet Take 1 to 2 Tablets every night 180 tablet 1    losartan (COZAAR) 100 MG tablet Take 1 tablet by mouth daily 90 tablet 1    tiZANidine (ZANAFLEX) 2 MG tablet Take 1 tablet by mouth every 8 hours as needed (muscle spasms) 270 tablet 1    citalopram (CELEXA) 40 MG tablet Take 1 tablet by mouth daily 90 tablet 1    ranitidine (ZANTAC) 150 MG tablet Take 1 tablet by mouth nightly 90 tablet 1    atorvastatin (LIPITOR) 80 MG tablet Take 1 tablet by mouth daily 90 tablet 1    metFORMIN (GLUCOPHAGE) 500 MG tablet Take 500 mg by mouth 2 times daily (with meals)      BD INSULIN SYRINGE ULTRAFINE 31G X 15/64\" 0.5 ML MISC       NOVOLIN 70/30 (70-30) 100 UNIT/ML injection vial Inject into the skin 2 times daily (with meals) 25 in the AM 22 at night      ACCU-CHEK TARIQ PLUS strip       aspirin 81 MG tablet Take 1 tablet by mouth nightly 30 tablet 3    glimepiride (AMARYL) 4 MG tablet Take 1 tablet by mouth every morning (before breakfast)       B-D ULTRAFINE III SHORT PEN 31G X tenderness. Sores in nares. Eyes: Conjunctivae are normal. Pupils are equal, round, and reactive to light. Neck: Normal range of motion. Neck supple. Cardiovascular: Normal rate and regular rhythm. No murmur heard. Pulmonary/Chest: Effort normal and breath sounds normal. No respiratory distress. She has no wheezes. Abdominal: Soft. Bowel sounds are normal.   Neurological: She is alert and oriented to person, place, and time. Skin: Skin is warm. Psychiatric: She has a normal mood and affect. Her behavior is normal.       Assessment / Plan:     1. Acute non-recurrent pansinusitis    - amoxicillin-clavulanate (AUGMENTIN) 875-125 MG per tablet; Take 1 tablet by mouth 2 times daily for 7 days  Dispense: 14 tablet; Refill: 0    2. Wound, open, nose with complication, initial encounter      Continue with Bactroban in nares. Okay to use Vaseline as well. Recommend saline spray. Antibiotic as prescribed. Monitor for worsening symptoms. Call office with concerns. Return if symptoms worsen or fail to improve. Aldairdonnie Douglass received counseling on the following healthy behaviors: nutrition, exercise and medication adherence  Reviewed prior labs and health maintenance. Continue current medications, diet and exercise. Discussed use, benefit, and side effects of prescribed medications. Barriers to medication compliance addressed. Patient given educational materials - see patient instructions. All patient questions answered. Patient voiced understanding.            Electronically signed by CHELSIE Mclean CNP on 7/15/2018 at 8:34 PM

## 2018-07-15 ASSESSMENT — ENCOUNTER SYMPTOMS
FACIAL SWELLING: 0
SHORTNESS OF BREATH: 0
DIARRHEA: 0
SORE THROAT: 0
SINUS PRESSURE: 1
SINUS PAIN: 1
COUGH: 0
NAUSEA: 0

## 2018-07-23 DIAGNOSIS — F41.9 ANXIETY: ICD-10-CM

## 2018-07-23 DIAGNOSIS — E78.5 HYPERLIPIDEMIA, UNSPECIFIED HYPERLIPIDEMIA TYPE: ICD-10-CM

## 2018-07-23 DIAGNOSIS — K21.9 GASTROESOPHAGEAL REFLUX DISEASE, ESOPHAGITIS PRESENCE NOT SPECIFIED: ICD-10-CM

## 2018-07-23 NOTE — TELEPHONE ENCOUNTER
syndrome     KRISHNA on CPAP     Diabetes mellitus type 2 with complications (HCC)     Hyperlipidemia with target LDL less than 100     Asthma     Allergic rhinitis     Essential hypertension     Major depression     Anxiety     Spondylisthesis     S/P lumbar fusion     Gastroesophageal reflux disease     Diabetic nephropathy associated with type 2 diabetes mellitus (HCC)     Moderate single current episode of major depressive disorder ([facility] [address]     Irritable bowel syndrome with diarrhea     Morbid obesity with BMI of 40.0-44.9, adult ([facility] [address]

## 2018-07-24 RX ORDER — ATORVASTATIN CALCIUM 80 MG/1
80 TABLET, FILM COATED ORAL DAILY
Qty: 90 TABLET | Refills: 1 | Status: SHIPPED | OUTPATIENT
Start: 2018-07-24 | End: 2019-07-17 | Stop reason: SDUPTHER

## 2018-07-24 RX ORDER — CITALOPRAM 40 MG/1
40 TABLET ORAL DAILY
Qty: 90 TABLET | Refills: 1 | Status: SHIPPED | OUTPATIENT
Start: 2018-07-24 | End: 2019-06-08 | Stop reason: SDUPTHER

## 2018-07-24 RX ORDER — RANITIDINE 150 MG/1
150 TABLET ORAL NIGHTLY
Qty: 90 TABLET | Refills: 1 | Status: SHIPPED | OUTPATIENT
Start: 2018-07-24 | End: 2019-12-05 | Stop reason: SDUPTHER

## 2018-08-28 RX ORDER — TRAZODONE HYDROCHLORIDE 100 MG/1
TABLET ORAL
Qty: 180 TABLET | Refills: 1 | Status: SHIPPED | OUTPATIENT
Start: 2018-08-28 | End: 2019-02-08 | Stop reason: SDUPTHER

## 2018-08-28 NOTE — TELEPHONE ENCOUNTER
LOV 6-13-18  LRF 4-20-18 # 180 with 1 refill  Patient states she is aware of pending labs and will complete prior to appointment    Health Maintenance   Topic Date Due    DTaP/Tdap/Td vaccine (1 - Tdap) 02/03/1967    Shingles Vaccine (1 of 2 - 2 Dose Series) 02/03/1998    Diabetic retinal exam  08/08/2017    Breast cancer screen  06/14/2019 (Originally 2/12/2018)    Flu vaccine (1) 09/01/2018    Lipid screen  01/18/2019    Potassium monitoring  01/18/2019    Creatinine monitoring  01/18/2019    Diabetic foot exam  06/29/2019    A1C test (Diabetic or Prediabetic)  06/29/2019    Colon cancer screen colonoscopy  01/23/2024    DEXA (modify frequency per FRAX score)  Addressed    Pneumococcal low/med risk  Completed    Hepatitis C screen  Completed             (applicable per patient's age: Cancer Screenings, Depression Screening, Fall Risk Screening, Immunizations)    Hemoglobin A1C (%)   Date Value   06/29/2018 6.7   02/15/2018 6.7   03/24/2017 7.1     Microalb/Crt.  Ratio (mcg/mg creat)   Date Value   03/21/2017 20     LDL Cholesterol (mg/dL)   Date Value   01/18/2018 71     LDL Calculated (mg/dL)   Date Value   07/29/2013 82     AST (U/L)   Date Value   01/18/2018 14     ALT (U/L)   Date Value   01/18/2018 16     BUN (mg/dL)   Date Value   01/18/2018 34 (H)      (goal A1C is < 7)   (goal LDL is <100) need 30-50% reduction from baseline     BP Readings from Last 3 Encounters:   07/06/18 122/74   06/13/18 122/80   03/07/18 118/70    (goal /80)      All Future Testing planned in [facility]:  Lab Frequency Next Occurrence   Comprehensive Metabolic Panel Once 97/15/0255   Microalbumin, Ur Once 04/22/2018   CBC Once 04/22/2018   Iron and TIBC Once 04/22/2018   Ferritin Once 04/22/2018   Hemoglobin A1C Once 04/22/2018   XR THORACIC SPINE (MIN 4 VIEWS) Once 06/13/2018       Next Visit Date:  Future Appointments  Date Time Provider Eva Dent   9/14/2018 2:20 PM MD Jeff Weinberg [facility] [place] [facility]            Patient Active Problem List:     Restless leg syndrome     KRISHNA on CPAP     Diabetes mellitus type 2 with complications ([facility] [address]     Hyperlipidemia with target LDL less than 100     Asthma     Allergic rhinitis     Essential hypertension     Major depression     Anxiety     Spondylisthesis     S/P lumbar fusion     Gastroesophageal reflux disease     Diabetic nephropathy associated with type 2 diabetes mellitus (HCC)     Moderate single current episode of major depressive disorder ([facility] [address]     Irritable bowel syndrome with diarrhea     Morbid obesity with BMI of 40.0-44.9, adult ([facility] [address]

## 2018-09-05 RX ORDER — TIZANIDINE 2 MG/1
2 TABLET ORAL EVERY 8 HOURS PRN
Qty: 270 TABLET | Refills: 1 | Status: SHIPPED | OUTPATIENT
Start: 2018-09-05 | End: 2019-03-13 | Stop reason: SDUPTHER

## 2018-09-05 NOTE — TELEPHONE ENCOUNTER
CPAP     Diabetes mellitus type 2 with complications (HCC)     Hyperlipidemia with target LDL less than 100     Asthma     Allergic rhinitis     Essential hypertension     Major depression     Anxiety     Spondylisthesis     S/P lumbar fusion     Gastroesophageal reflux disease     Diabetic nephropathy associated with type 2 diabetes mellitus (HCC)     Moderate single current episode of major depressive disorder ([facility] [address]     Irritable bowel syndrome with diarrhea     Morbid obesity with BMI of 40.0-44.9, adult ([facility] [address]

## 2018-09-06 ENCOUNTER — TELEPHONE (OUTPATIENT)
Dept: FAMILY MEDICINE CLINIC | Age: 68
End: 2018-09-06

## 2018-09-06 DIAGNOSIS — Z79.4 TYPE 2 DIABETES MELLITUS WITH COMPLICATION, WITH LONG-TERM CURRENT USE OF INSULIN (HCC): Primary | ICD-10-CM

## 2018-09-06 DIAGNOSIS — E11.8 TYPE 2 DIABETES MELLITUS WITH COMPLICATION, WITH LONG-TERM CURRENT USE OF INSULIN (HCC): Primary | ICD-10-CM

## 2018-09-06 NOTE — TELEPHONE ENCOUNTER
Patent is requesting a order to be faxed to Dr[facility]. Fax number [phone] ATTN: Lucy Mohamud. Referral pending to be signed. lucio

## 2018-09-10 ENCOUNTER — HOSPITAL ENCOUNTER (OUTPATIENT)
Age: 68
Setting detail: SPECIMEN
Discharge: HOME | End: 2018-09-10
Payer: MEDICARE

## 2018-09-10 DIAGNOSIS — Z79.4 TYPE 2 DIABETES MELLITUS WITH COMPLICATION, WITH LONG-TERM CURRENT USE OF INSULIN (HCC): ICD-10-CM

## 2018-09-10 DIAGNOSIS — I10 ESSENTIAL HYPERTENSION: ICD-10-CM

## 2018-09-10 DIAGNOSIS — E11.8 TYPE 2 DIABETES MELLITUS WITH COMPLICATION, WITH LONG-TERM CURRENT USE OF INSULIN (HCC): ICD-10-CM

## 2018-09-10 DIAGNOSIS — D64.9 LOW HEMOGLOBIN AND LOW HEMATOCRIT: ICD-10-CM

## 2018-09-10 DIAGNOSIS — E78.5 HYPERLIPIDEMIA WITH TARGET LDL LESS THAN 100: ICD-10-CM

## 2018-09-10 LAB
ALBUMIN SERPL-MCNC: 4 G/DL (ref 3.5–5.2)
ALBUMIN SERPL-MCNC: 4 G/DL (ref 3.5–5.2)
ALBUMIN/GLOBULIN RATIO: 1.4 (ref 1–2.5)
ALBUMIN/GLOBULIN RATIO: 1.4 (ref 1–2.5)
ALP BLD-CCNC: 180 U/L (ref 35–104)
ALP BLD-CCNC: 180 U/L (ref 35–104)
ALT SERPL-CCNC: 16 U/L (ref 5–33)
ALT SERPL-CCNC: 16 U/L (ref 5–33)
ANION GAP SERPL CALCULATED.3IONS-SCNC: 14 MMOL/L (ref 9–17)
ANION GAP SERPL CALCULATED.3IONS-SCNC: 14 MMOL/L (ref 9–17)
AST SERPL-CCNC: 15 U/L
AST SERPL-CCNC: 15 U/L
BILIRUB SERPL-MCNC: <0.1 MG/DL (ref 0.3–1.2)
BILIRUB SERPL-MCNC: <0.1 MG/DL (ref 0.3–1.2)
BUN BLDV-MCNC: 24 MG/DL (ref 8–23)
BUN BLDV-MCNC: 24 MG/DL (ref 8–23)
BUN/CREAT BLD: ABNORMAL (ref 9–20)
BUN/CREAT BLD: ABNORMAL (ref 9–20)
CALCIUM SERPL-MCNC: 9.2 MG/DL (ref 8.6–10.4)
CALCIUM SERPL-MCNC: 9.2 MG/DL (ref 8.6–10.4)
CHLORIDE BLD-SCNC: 107 MMOL/L (ref 98–107)
CHLORIDE BLD-SCNC: 107 MMOL/L (ref 98–107)
CHOLESTEROL/HDL RATIO: 3.8
CHOLESTEROL: 137 MG/DL
CO2: 22 MMOL/L (ref 20–31)
CO2: 22 MMOL/L (ref 20–31)
CREAT SERPL-MCNC: 1.33 MG/DL (ref 0.5–0.9)
CREAT SERPL-MCNC: 1.33 MG/DL (ref 0.5–0.9)
CREATININE URINE: 144.2 MG/DL (ref 28–217)
FERRITIN: 53 UG/L (ref 13–150)
GFR AFRICAN AMERICAN: 48 ML/MIN
GFR AFRICAN AMERICAN: 48 ML/MIN
GFR NON-AFRICAN AMERICAN: 39 ML/MIN
GFR NON-AFRICAN AMERICAN: 39 ML/MIN
GFR SERPL CREATININE-BSD FRML MDRD: ABNORMAL ML/MIN/{1.73_M2}
GLUCOSE BLD-MCNC: 162 MG/DL (ref 70–99)
GLUCOSE BLD-MCNC: 162 MG/DL (ref 70–99)
HCT VFR BLD CALC: 37 % (ref 36.3–47.1)
HDLC SERPL-MCNC: 36 MG/DL
HEMOGLOBIN: 11.4 G/DL (ref 11.9–15.1)
IRON SATURATION: 14 % (ref 20–55)
IRON: 45 UG/DL (ref 37–145)
LDL CHOLESTEROL: 74 MG/DL (ref 0–130)
MCH RBC QN AUTO: 28 PG (ref 25.2–33.5)
MCHC RBC AUTO-ENTMCNC: 30.8 G/DL (ref 28.4–34.8)
MCV RBC AUTO: 90.9 FL (ref 82.6–102.9)
MICROALBUMIN/CREAT 24H UR: 19 MG/L
MICROALBUMIN/CREAT UR-RTO: 13 MCG/MG CREAT
NRBC AUTOMATED: 0 PER 100 WBC
PDW BLD-RTO: 14.1 % (ref 11.8–14.4)
PLATELET # BLD: 234 K/UL (ref 138–453)
PMV BLD AUTO: 10 FL (ref 8.1–13.5)
POTASSIUM SERPL-SCNC: 4.5 MMOL/L (ref 3.7–5.3)
POTASSIUM SERPL-SCNC: 4.5 MMOL/L (ref 3.7–5.3)
RBC # BLD: 4.07 M/UL (ref 3.95–5.11)
SODIUM BLD-SCNC: 143 MMOL/L (ref 135–144)
SODIUM BLD-SCNC: 143 MMOL/L (ref 135–144)
TOTAL IRON BINDING CAPACITY: 319 UG/DL (ref 250–450)
TOTAL PROTEIN: 6.8 G/DL (ref 6.4–8.3)
TOTAL PROTEIN: 6.8 G/DL (ref 6.4–8.3)
TRIGL SERPL-MCNC: 137 MG/DL
UNSATURATED IRON BINDING CAPACITY: 274 UG/DL (ref 112–347)
VITAMIN B-12: 273 PG/ML (ref 232–1245)
VLDLC SERPL CALC-MCNC: ABNORMAL MG/DL (ref 1–30)
WBC # BLD: 9.2 K/UL (ref 3.5–11.3)

## 2018-09-11 LAB
ESTIMATED AVERAGE GLUCOSE: 157 MG/DL
HBA1C MFR BLD: 7.1 % (ref 4–6)

## 2018-09-12 DIAGNOSIS — K58.0 IRRITABLE BOWEL SYNDROME WITH DIARRHEA: ICD-10-CM

## 2018-09-12 DIAGNOSIS — I10 ESSENTIAL HYPERTENSION: Primary | ICD-10-CM

## 2018-09-12 DIAGNOSIS — E11.8 TYPE 2 DIABETES MELLITUS WITH COMPLICATION, WITH LONG-TERM CURRENT USE OF INSULIN (HCC): ICD-10-CM

## 2018-09-12 DIAGNOSIS — Z79.4 TYPE 2 DIABETES MELLITUS WITH COMPLICATION, WITH LONG-TERM CURRENT USE OF INSULIN (HCC): ICD-10-CM

## 2018-09-12 DIAGNOSIS — E78.5 HYPERLIPIDEMIA WITH TARGET LDL LESS THAN 100: ICD-10-CM

## 2018-09-12 DIAGNOSIS — E61.1 LOW SERUM IRON: ICD-10-CM

## 2018-09-12 LAB
CREATININE URINE: 144.2 MG/DL (ref 28–217)
MICROALBUMIN/CREAT 24H UR: 19 MG/L
MICROALBUMIN/CREAT UR-RTO: 13 MCG/MG CREAT

## 2018-09-14 ENCOUNTER — OFFICE VISIT (OUTPATIENT)
Dept: FAMILY MEDICINE CLINIC | Age: 68
End: 2018-09-14
Payer: MEDICARE

## 2018-09-14 VITALS
RESPIRATION RATE: 18 BRPM | SYSTOLIC BLOOD PRESSURE: 120 MMHG | HEART RATE: 76 BPM | TEMPERATURE: 97.6 F | WEIGHT: 230 LBS | BODY MASS INDEX: 40.1 KG/M2 | DIASTOLIC BLOOD PRESSURE: 74 MMHG

## 2018-09-14 DIAGNOSIS — I10 ESSENTIAL HYPERTENSION: Primary | ICD-10-CM

## 2018-09-14 DIAGNOSIS — K21.9 GASTROESOPHAGEAL REFLUX DISEASE, ESOPHAGITIS PRESENCE NOT SPECIFIED: ICD-10-CM

## 2018-09-14 DIAGNOSIS — E53.8 B12 DEFICIENCY: ICD-10-CM

## 2018-09-14 DIAGNOSIS — G47.31 CENTRAL SLEEP APNEA: ICD-10-CM

## 2018-09-14 DIAGNOSIS — J45.20 MILD INTERMITTENT ASTHMA WITHOUT COMPLICATION: ICD-10-CM

## 2018-09-14 DIAGNOSIS — M43.10 SPONDYLOLISTHESIS, UNSPECIFIED SPINAL REGION: ICD-10-CM

## 2018-09-14 DIAGNOSIS — G47.33 OSA (OBSTRUCTIVE SLEEP APNEA): ICD-10-CM

## 2018-09-14 DIAGNOSIS — G89.29 CHRONIC BILATERAL THORACIC BACK PAIN: ICD-10-CM

## 2018-09-14 DIAGNOSIS — M25.551 BILATERAL HIP PAIN: ICD-10-CM

## 2018-09-14 DIAGNOSIS — M54.6 CHRONIC BILATERAL THORACIC BACK PAIN: ICD-10-CM

## 2018-09-14 DIAGNOSIS — F32.1 MODERATE SINGLE CURRENT EPISODE OF MAJOR DEPRESSIVE DISORDER (HCC): ICD-10-CM

## 2018-09-14 DIAGNOSIS — M25.511 CHRONIC PAIN OF BOTH SHOULDERS: ICD-10-CM

## 2018-09-14 DIAGNOSIS — M25.552 BILATERAL HIP PAIN: ICD-10-CM

## 2018-09-14 DIAGNOSIS — M25.562 CHRONIC PAIN OF LEFT KNEE: ICD-10-CM

## 2018-09-14 DIAGNOSIS — G25.81 RESTLESS LEG SYNDROME: ICD-10-CM

## 2018-09-14 DIAGNOSIS — M25.512 CHRONIC PAIN OF BOTH SHOULDERS: ICD-10-CM

## 2018-09-14 DIAGNOSIS — J30.1 CHRONIC SEASONAL ALLERGIC RHINITIS DUE TO POLLEN: ICD-10-CM

## 2018-09-14 DIAGNOSIS — F41.9 ANXIETY: ICD-10-CM

## 2018-09-14 DIAGNOSIS — G89.29 CHRONIC PAIN OF BOTH SHOULDERS: ICD-10-CM

## 2018-09-14 DIAGNOSIS — E78.5 HYPERLIPIDEMIA WITH TARGET LDL LESS THAN 100: ICD-10-CM

## 2018-09-14 DIAGNOSIS — Z98.1 S/P LUMBAR FUSION: ICD-10-CM

## 2018-09-14 DIAGNOSIS — G89.29 CHRONIC PAIN OF LEFT KNEE: ICD-10-CM

## 2018-09-14 PROCEDURE — 4040F PNEUMOC VAC/ADMIN/RCVD: CPT | Performed by: PEDIATRICS

## 2018-09-14 PROCEDURE — 1123F ACP DISCUSS/DSCN MKR DOCD: CPT | Performed by: PEDIATRICS

## 2018-09-14 PROCEDURE — 3017F COLORECTAL CA SCREEN DOC REV: CPT | Performed by: PEDIATRICS

## 2018-09-14 PROCEDURE — 1036F TOBACCO NON-USER: CPT | Performed by: PEDIATRICS

## 2018-09-14 PROCEDURE — 96372 THER/PROPH/DIAG INJ SC/IM: CPT | Performed by: PEDIATRICS

## 2018-09-14 PROCEDURE — G8427 DOCREV CUR MEDS BY ELIG CLIN: HCPCS | Performed by: PEDIATRICS

## 2018-09-14 PROCEDURE — G8417 CALC BMI ABV UP PARAM F/U: HCPCS | Performed by: PEDIATRICS

## 2018-09-14 PROCEDURE — G8399 PT W/DXA RESULTS DOCUMENT: HCPCS | Performed by: PEDIATRICS

## 2018-09-14 PROCEDURE — 1101F PT FALLS ASSESS-DOCD LE1/YR: CPT | Performed by: PEDIATRICS

## 2018-09-14 PROCEDURE — 99214 OFFICE O/P EST MOD 30 MIN: CPT | Performed by: PEDIATRICS

## 2018-09-14 PROCEDURE — 1090F PRES/ABSN URINE INCON ASSESS: CPT | Performed by: PEDIATRICS

## 2018-09-14 RX ORDER — CYANOCOBALAMIN 1000 UG/ML
1000 INJECTION INTRAMUSCULAR; SUBCUTANEOUS ONCE
Status: COMPLETED | OUTPATIENT
Start: 2018-09-14 | End: 2018-09-14

## 2018-09-14 RX ADMIN — CYANOCOBALAMIN 1000 MCG: 1000 INJECTION INTRAMUSCULAR; SUBCUTANEOUS at 15:50

## 2018-09-14 ASSESSMENT — ENCOUNTER SYMPTOMS
SORE THROAT: 0
EYE PAIN: 0
SINUS PRESSURE: 0
EYE DISCHARGE: 0
CHOKING: 0
BACK PAIN: 1
COUGH: 0
STRIDOR: 0
TROUBLE SWALLOWING: 0
VOMITING: 0
ABDOMINAL PAIN: 0
BLOOD IN STOOL: 0
WHEEZING: 0
NAUSEA: 0
EYE REDNESS: 0
DIARRHEA: 1
RHINORRHEA: 0
SHORTNESS OF BREATH: 0
ORTHOPNEA: 0
CHEST TIGHTNESS: 0
CONSTIPATION: 0
COLOR CHANGE: 0

## 2018-09-14 NOTE — PROGRESS NOTES
nervousness/anxiousness (stable). Pertinent negatives for hypoglycemia include no confusion, dizziness, headaches or tremors. There are no diabetic associated symptoms. Pertinent negatives for diabetes include no chest pain, no polydipsia, no polyphagia, no polyuria and no weakness. There are no hypoglycemic complications. There are no diabetic complications. Risk factors for coronary artery disease include diabetes mellitus, hypertension, obesity, stress and sedentary lifestyle. Current diabetic treatment includes insulin injections, oral agent (dual therapy) and diet. She is compliant with treatment most of the time. Her weight is stable. She is following a diabetic diet. When asked about meal planning, she reported none. She has not had a previous visit with a dietitian. She rarely participates in exercise. She does not see a podiatrist.Eye exam is not current. Review of Systems   Constitutional: Negative for appetite change, fever and malaise/fatigue. HENT: Negative for congestion, ear pain, postnasal drip, rhinorrhea, sinus pressure, sneezing, sore throat, tinnitus and trouble swallowing. Eyes: Negative for pain, discharge and redness. Respiratory: Negative for cough, choking, chest tightness, shortness of breath, wheezing and stridor. Asthma well controlled with singulair daily and albuterol as needed. KRISHNA / central sleep apnea controlled with Servovent nightly - she follows up with Dr. Zhao Qureshi. Cardiovascular: Negative for chest pain, palpitations, orthopnea, leg swelling and PND. Gastrointestinal: Positive for diarrhea (secondary to irritable bowel - improved with Pixifly but she does have intermittent episodes once per week). Negative for abdominal pain, blood in stool, constipation, nausea and vomiting. GERD controlled with omeprazole and zantac   Endocrine: Negative for polydipsia, polyphagia and polyuria.    Genitourinary: Negative for dysuria and pelvic pain.   Musculoskeletal: Positive for arthralgias (bilateral shoulder pain chronic and bilateral hip pain / left knee pain) and back pain (Mid back pain occasionally and right hip pain / left knee pain). Negative for myalgias and neck pain. Skin: Negative for color change, rash and wound. Allergic/Immunologic: Negative for immunocompromised state. Neurological: Positive for numbness. Negative for dizziness, tremors, focal weakness, weakness, light-headedness and headaches. Hematological: Negative for adenopathy. Does not bruise/bleed easily. History of mild anemia   Psychiatric/Behavioral: Positive for dysphoric mood (stable) and sleep disturbance (better with elavil however this makes her very fatigued all day). Negative for agitation, confusion and decreased concentration. The patient is nervous/anxious (stable). Objective:     /74 (Site: Right Upper Arm, Position: Sitting, Cuff Size: Medium Adult)   Pulse 76   Temp 97.6 °F (36.4 °C) (Tympanic)   Resp 18   Wt 230 lb (104.3 kg)   Breastfeeding? No   BMI 40.10 kg/m²        Physical Exam   Constitutional: She is oriented to person, place, and time. She appears well-developed and well-nourished. No distress. obese   HENT:   Head: Normocephalic. Right Ear: External ear normal.   Left Ear: External ear normal.   Nose: Nose normal.   Mouth/Throat: Oropharynx is clear and moist. No oropharyngeal exudate. Eyes: Pupils are equal, round, and reactive to light. Conjunctivae and EOM are normal. Right eye exhibits no discharge. Left eye exhibits no discharge. No scleral icterus. Neck: Normal range of motion. Neck supple. No JVD present. No thyromegaly present. Cardiovascular: Normal rate, regular rhythm and normal heart sounds. No murmur heard. Pulmonary/Chest: Effort normal and breath sounds normal. No stridor. She has no wheezes. She has no rales. Abdominal: Soft. Bowel sounds are normal. She exhibits no mass.  There is no tenderness. Musculoskeletal: She exhibits no edema. Right shoulder: She exhibits decreased range of motion, tenderness and decreased strength. Left shoulder: She exhibits decreased range of motion and tenderness. Right hip: She exhibits tenderness. She exhibits normal strength. Left hip: She exhibits tenderness. She exhibits normal strength. Right knee: Normal.        Left knee: She exhibits decreased range of motion. Tenderness found. Medial joint line tenderness noted. Back:         Legs:  Lymphadenopathy:     She has no cervical adenopathy. Neurological: She is alert and oriented to person, place, and time. Skin: Skin is warm. No rash noted. She is not diaphoretic. Psychiatric: Her speech is normal and behavior is normal. Judgment and thought content normal. Her mood appears not anxious. Cognition and memory are normal. She does not exhibit a depressed mood. Nursing note and vitals reviewed. Assessment:      Diagnosis Orders   1. Essential hypertension     2. Hyperlipidemia with target LDL less than 100     3. Mild intermittent asthma without complication     4. Chronic seasonal allergic rhinitis due to pollen     5. KRISHNA (obstructive sleep apnea)     6. Central sleep apnea     7. Restless leg syndrome     8. Gastroesophageal reflux disease, esophagitis presence not specified     9. Moderate single current episode of major depressive disorder ([facility] [address]     10. Anxiety     11. Spondylolisthesis, unspecified spinal region  External Referral To Physical Therapy   12. S/P lumbar fusion     13. Chronic bilateral thoracic back pain  External Referral To Physical Therapy   14. Bilateral hip pain  External Referral To Physical Therapy   15. Chronic pain of both shoulders  External Referral To Physical Therapy   16. Chronic pain of left knee  External Referral To Physical Therapy   17.  B12 deficiency  cyanocobalamin injection 1,000 mcg       Plan:     Proceed with obtain

## 2018-09-17 DIAGNOSIS — M43.10 SPONDYLOLISTHESIS, UNSPECIFIED SPINAL REGION: ICD-10-CM

## 2018-09-17 DIAGNOSIS — Z98.1 S/P LUMBAR FUSION: ICD-10-CM

## 2018-09-17 RX ORDER — OXYCODONE HYDROCHLORIDE AND ACETAMINOPHEN 5; 325 MG/1; MG/1
1 TABLET ORAL DAILY PRN
Qty: 30 TABLET | Refills: 0 | Status: SHIPPED | OUTPATIENT
Start: 2018-09-17 | End: 2018-12-20 | Stop reason: SDUPTHER

## 2018-09-17 NOTE — TELEPHONE ENCOUNTER
Visit Date:  Future Appointments  Date Time Provider Eva Dent   11/5/2018 11:00 AM Abdullahi Ann MD [facility]   12/17/2018 2:20 PM MD Valerie Lanier [facility] [address] [facility]            Patient Active Problem List:     Restless leg syndrome     KRISHNA on CPAP     Diabetes mellitus type 2 with complications ([address]     Hyperlipidemia with target LDL less than 100     Asthma     Allergic rhinitis     Essential hypertension     Major depression     Anxiety     Spondylisthesis     S/P lumbar fusion     Gastroesophageal reflux disease     Diabetic nephropathy associated with type 2 diabetes mellitus (HCC)     Moderate single current episode of major depressive disorder ([address]     Irritable bowel syndrome with diarrhea     Morbid obesity with BMI of 40.0-44.9, adult ([address]

## 2018-10-01 NOTE — TELEPHONE ENCOUNTER
12/17/2018 2:20 PM MD [facility] [address] [facility]            Patient Active Problem List:     Restless leg syndrome     KRISHNA on CPAP     Diabetes mellitus type 2 with complications ([facility] [address]     Hyperlipidemia with target LDL less than 100     Asthma     Allergic rhinitis     Essential hypertension     Major depression     Anxiety     Spondylisthesis     S/P lumbar fusion     Gastroesophageal reflux disease     Diabetic nephropathy associated with type 2 diabetes mellitus (HCC)     Moderate single current episode of major depressive disorder ([facility] [address]     Irritable bowel syndrome with diarrhea     Morbid obesity with BMI of 40.0-44.9, adult ([facility] [address]  ,

## 2018-10-02 RX ORDER — LOSARTAN POTASSIUM 100 MG/1
100 TABLET ORAL DAILY
Qty: 90 TABLET | Refills: 1 | Status: SHIPPED | OUTPATIENT
Start: 2018-10-02 | End: 2019-02-26 | Stop reason: SDUPTHER

## 2018-10-06 DIAGNOSIS — F41.9 ANXIETY: ICD-10-CM

## 2018-10-08 RX ORDER — LORAZEPAM 0.5 MG/1
0.5 TABLET ORAL 2 TIMES DAILY PRN
Qty: 180 TABLET | Refills: 0 | Status: SHIPPED | OUTPATIENT
Start: 2018-10-08 | End: 2018-12-20 | Stop reason: SDUPTHER

## 2018-10-08 NOTE — TELEPHONE ENCOUNTER
LOV:9-14-18  EEQ:81-5-74  DOS:7-[birth year]-91  Health Maintenance   Topic Date Due    DTaP/Tdap/Td vaccine (1 - Tdap) 02/03/1967    Shingles Vaccine (1 of 2 - 2 Dose Series) 02/03/1998    Diabetic retinal exam  08/08/2017    Flu vaccine (1) 10/15/2018 (Originally 9/1/2018)    Breast cancer screen  06/14/2019 (Originally 2/12/2018)    Diabetic foot exam  06/29/2019    A1C test (Diabetic or Prediabetic)  09/10/2019    Lipid screen  09/10/2019    Potassium monitoring  09/10/2019    Creatinine monitoring  09/10/2019    Colon cancer screen colonoscopy  01/23/2024    DEXA (modify frequency per FRAX score)  Addressed    Pneumococcal low/med risk  Completed    Hepatitis C screen  Completed             (applicable per patient's age: Cancer Screenings, Depression Screening, Fall Risk Screening, Immunizations)    Hemoglobin A1C (%)   Date Value   09/10/2018 7.1 (H)   06/29/2018 6.7   02/15/2018 6.7     Microalb/Crt.  Ratio (mcg/mg creat)   Date Value   09/10/2018 13   09/10/2018 13     LDL Cholesterol (mg/dL)   Date Value   09/10/2018 74     LDL Calculated (mg/dL)   Date Value   07/29/2013 82     AST (U/L)   Date Value   09/10/2018 15   09/10/2018 15     ALT (U/L)   Date Value   09/10/2018 16   09/10/2018 16     BUN (mg/dL)   Date Value   09/10/2018 24 (H)   09/10/2018 24 (H)      (goal A1C is < 7)   (goal LDL is <100) need 30-50% reduction from baseline     BP Readings from Last 3 Encounters:   09/14/18 120/74   07/06/18 122/74   06/13/18 122/80    (goal /80)      All Future Testing planned in [facility]:  Lab Frequency Next Occurrence   XR THORACIC SPINE (MIN 4 VIEWS) Once 06/13/2018   Lipid Panel Once 12/12/2018   Comprehensive Metabolic Panel Once 74/67/9528   Iron And TIBC Once 12/12/2018   CBC Once 12/12/2018   Hemoglobin A1C Once 12/12/2018       Next Visit Date:  Future Appointments  Date Time Provider Eva Dent   11/5/2018 11:00 AM MD Ángel Monae  [address] [facility]   12/17/2018 2:20 PM

## 2018-10-18 DIAGNOSIS — F41.9 ANXIETY: Primary | ICD-10-CM

## 2018-10-18 RX ORDER — LORAZEPAM 0.5 MG/1
0.5 TABLET ORAL 2 TIMES DAILY PRN
Qty: 60 TABLET | Refills: 0 | Status: SHIPPED | OUTPATIENT
Start: 2018-10-18 | End: 2018-11-05 | Stop reason: SDUPTHER

## 2018-10-18 NOTE — TELEPHONE ENCOUNTER
Patient called in stating Shan advised her that the Ativan is currently on backorder. She called [facility] and they do have a small supply they can give her with a script. Patient would like a script sent for 30 days to get her through until [facility] has there supply back. Did verify with shan they did not send any out to patient and there supply is on backorder. lucio

## 2018-11-05 ENCOUNTER — OFFICE VISIT (OUTPATIENT)
Dept: FAMILY MEDICINE CLINIC | Age: 68
End: 2018-11-05
Payer: MEDICARE

## 2018-11-05 VITALS
BODY MASS INDEX: 40.45 KG/M2 | RESPIRATION RATE: 16 BRPM | WEIGHT: 232 LBS | SYSTOLIC BLOOD PRESSURE: 120 MMHG | DIASTOLIC BLOOD PRESSURE: 74 MMHG | TEMPERATURE: 97.2 F | HEART RATE: 72 BPM

## 2018-11-05 DIAGNOSIS — Z23 NEED FOR TETANUS BOOSTER: ICD-10-CM

## 2018-11-05 DIAGNOSIS — Z23 NEED FOR INFLUENZA VACCINATION: ICD-10-CM

## 2018-11-05 DIAGNOSIS — G47.33 OSA (OBSTRUCTIVE SLEEP APNEA): Primary | ICD-10-CM

## 2018-11-05 DIAGNOSIS — G47.31 CENTRAL SLEEP APNEA: ICD-10-CM

## 2018-11-05 PROCEDURE — G8427 DOCREV CUR MEDS BY ELIG CLIN: HCPCS | Performed by: PEDIATRICS

## 2018-11-05 PROCEDURE — G8482 FLU IMMUNIZE ORDER/ADMIN: HCPCS | Performed by: PEDIATRICS

## 2018-11-05 PROCEDURE — 90688 IIV4 VACCINE SPLT 0.5 ML IM: CPT | Performed by: PEDIATRICS

## 2018-11-05 PROCEDURE — G0008 ADMIN INFLUENZA VIRUS VAC: HCPCS | Performed by: PEDIATRICS

## 2018-11-05 PROCEDURE — G8417 CALC BMI ABV UP PARAM F/U: HCPCS | Performed by: PEDIATRICS

## 2018-11-05 PROCEDURE — 1123F ACP DISCUSS/DSCN MKR DOCD: CPT | Performed by: PEDIATRICS

## 2018-11-05 PROCEDURE — 1101F PT FALLS ASSESS-DOCD LE1/YR: CPT | Performed by: PEDIATRICS

## 2018-11-05 PROCEDURE — 3017F COLORECTAL CA SCREEN DOC REV: CPT | Performed by: PEDIATRICS

## 2018-11-05 PROCEDURE — 1090F PRES/ABSN URINE INCON ASSESS: CPT | Performed by: PEDIATRICS

## 2018-11-05 PROCEDURE — 4040F PNEUMOC VAC/ADMIN/RCVD: CPT | Performed by: PEDIATRICS

## 2018-11-05 PROCEDURE — G8399 PT W/DXA RESULTS DOCUMENT: HCPCS | Performed by: PEDIATRICS

## 2018-11-05 PROCEDURE — 1036F TOBACCO NON-USER: CPT | Performed by: PEDIATRICS

## 2018-11-05 PROCEDURE — 99213 OFFICE O/P EST LOW 20 MIN: CPT | Performed by: PEDIATRICS

## 2018-11-05 ASSESSMENT — ENCOUNTER SYMPTOMS
EYE PAIN: 0
STRIDOR: 0
EYE DISCHARGE: 0
COUGH: 0
EYE REDNESS: 0
RHINORRHEA: 0
COLOR CHANGE: 0
CONSTIPATION: 0
SHORTNESS OF BREATH: 0
BACK PAIN: 1
PHOTOPHOBIA: 0
DIARRHEA: 0
VOMITING: 0
WHEEZING: 0
ABDOMINAL PAIN: 0

## 2018-11-05 NOTE — PROGRESS NOTES
risk  Completed    Hepatitis C screen  Completed       PHQ Scores 8/4/2017   PHQ2 Score 2   PHQ9 Score 2     Interpretation of Total Score DepressionSeverity: 1-4 = Minimal depression, 5-9 = Mild depression, 10-14 = Moderate depression, 15-19 = Moderately severe depression, 20-27 = Severe depression    Current Outpatient Prescriptions   Medication Sig Dispense Refill    Tdap (ADACEL) 5-2-15.5 LF-MCG/0.5 injection Inject 0.5 mLs into the muscle once for 1 dose 0.5 mL 0    LORazepam (ATIVAN) 0.5 MG tablet Take 1 tablet by mouth 2 times daily as needed for Anxiety. . 180 tablet 0    losartan (COZAAR) 100 MG tablet Take 1 tablet by mouth daily 90 tablet 1    oxyCODONE-acetaminophen (PERCOCET) 5-325 MG per tablet Take 1 tablet by mouth daily as needed for Pain. . 30 tablet 0    tiZANidine (ZANAFLEX) 2 MG tablet Take 1 tablet by mouth every 8 hours as needed (muscle spasms) 270 tablet 1    traZODone (DESYREL) 100 MG tablet Take 1 to 2 Tablets every night 180 tablet 1    atorvastatin (LIPITOR) 80 MG tablet Take 1 tablet by mouth daily 90 tablet 1    citalopram (CELEXA) 40 MG tablet Take 1 tablet by mouth daily 90 tablet 1    ranitidine (ZANTAC) 150 MG tablet Take 1 tablet by mouth nightly 90 tablet 1    gabapentin (NEURONTIN) 300 MG capsule Take 1 capsule by mouth 3 times daily. . 270 capsule 1    metFORMIN (GLUCOPHAGE) 500 MG tablet Take 500 mg by mouth 2 times daily (with meals)      BD INSULIN SYRINGE ULTRAFINE 31G X 15/64\" 0.5 ML MISC       NOVOLIN 70/30 (70-30) 100 UNIT/ML injection vial Inject into the skin 2 times daily (with meals) 25 in the AM 22 at night      ACCU-CHEK TARIQ PLUS strip       aspirin 81 MG tablet Take 1 tablet by mouth nightly 30 tablet 3    glimepiride (AMARYL) 4 MG tablet Take 1 tablet by mouth every morning (before breakfast)       B-D ULTRAFINE III SHORT PEN 31G X 8 MM MISC       omeprazole (PRILOSEC) 20 MG capsule Take 20 mg by mouth nightly Indications: GERD-Related Laryngitis       albuterol (PROVENTIL) (2.5 MG/3ML) 0.083% nebulizer solution Take 2.5 mg by nebulization every 4 hours as needed for Wheezing.  Multiple Vitamins-Minerals (MULTI COMPLETE PO) Take 1 tablet by mouth nightly       Probiotic Product ([address]) Take 1 tablet by mouth nightly        No current facility-administered medications for this visit. HPI:      Patient presents today for routine follow-up of obstructive sleep apnea and central sleep apnea. Patient was originally evaluated by her pulmonologist, Dr. Falguni Trujillo and was found to have obstructive sleep apnea along with central sleep apnea. She was seen by cardiology and cleared for use of a servo vent. She did start using this in September. She usually will start using this at approximately 1 AM or 2 AM and wears it until 6 AM when she gets up to take her dogs out. She will usually put it back on and goes back to bed until 10 AM.  She states that her mask does fit well and she has not noticed any leaks. Her mask is quite comfortable. She has had improvement in her daytime fatigue. She does still get some fatigue but she thinks this may be related to her depression. She states that she does have a monitor that reads that she is having 3-7 events of central sleep apnea nightly. She will follow-up with her pulmonologist as scheduled. She is due for a flu vaccine today. She has no other concerns at this time. cmw        Patient presents in the office today for a follow up from her CPAP machine. Review of Systems   Constitutional: Positive for fatigue (improved with use of servo vent). Negative for appetite change, fever and unexpected weight change. HENT: Negative for congestion, postnasal drip and rhinorrhea. Eyes: Negative for photophobia, pain, discharge, redness and visual disturbance. Respiratory: Negative for cough, shortness of breath, wheezing and stridor.     Cardiovascular: Positive for leg

## 2018-12-09 DIAGNOSIS — G25.81 RESTLESS LEG SYNDROME: Primary | ICD-10-CM

## 2018-12-11 RX ORDER — GABAPENTIN 300 MG/1
300 CAPSULE ORAL 3 TIMES DAILY
Qty: 270 CAPSULE | Refills: 1 | Status: SHIPPED | OUTPATIENT
Start: 2018-12-11 | End: 2019-06-08 | Stop reason: SDUPTHER

## 2018-12-11 NOTE — TELEPHONE ENCOUNTER
LRF 5-4-18  #270  Rf 1  LOV 9-14-18  RTO 3 mo    Health Maintenance   Topic Date Due    DTaP/Tdap/Td vaccine (1 - Tdap) 02/03/1967    Shingles Vaccine (1 of 2 - 2 Dose Series) 02/03/1998    Diabetic retinal exam  08/08/2017    Breast cancer screen  06/14/2019 (Originally 2/12/2018)    Diabetic foot exam  06/29/2019    A1C test (Diabetic or Prediabetic)  09/10/2019    Lipid screen  09/10/2019    Potassium monitoring  09/10/2019    Creatinine monitoring  09/10/2019    Colon cancer screen colonoscopy  01/23/2024    DEXA (modify frequency per FRAX score)  Addressed    Flu vaccine  Completed    Pneumococcal low/med risk  Completed    Hepatitis C screen  Completed             (applicable per patient's age: Cancer Screenings, Depression Screening, Fall Risk Screening, Immunizations)    Hemoglobin A1C (%)   Date Value   09/10/2018 7.1 (H)   06/29/2018 6.7   02/15/2018 6.7     Microalb/Crt.  Ratio (mcg/mg creat)   Date Value   09/10/2018 13   09/10/2018 13     LDL Cholesterol (mg/dL)   Date Value   09/10/2018 74     LDL Calculated (mg/dL)   Date Value   07/29/2013 82     AST (U/L)   Date Value   09/10/2018 15   09/10/2018 15     ALT (U/L)   Date Value   09/10/2018 16   09/10/2018 16     BUN (mg/dL)   Date Value   09/10/2018 24 (H)   09/10/2018 24 (H)      (goal A1C is < 7)   (goal LDL is <100) need 30-50% reduction from baseline     BP Readings from Last 3 Encounters:   11/05/18 120/74   09/14/18 120/74   07/06/18 122/74    (goal /80)      All Future Testing planned in [facility]:  Lab Frequency Next Occurrence   XR THORACIC SPINE (MIN 4 VIEWS) Once 06/13/2018   Lipid Panel Once 12/12/2018   Comprehensive Metabolic Panel Once 47/46/0802   Iron And TIBC Once 12/12/2018   CBC Once 12/12/2018   Hemoglobin A1C Once 12/12/2018       Next Visit Date:  Future Appointments  Date Time Provider Eva Dent   12/17/2018 2:20 PM MD Jaylin Leal Armt            Patient Active Problem List:     Restless leg syndrome     RKISHNA on CPAP     Diabetes mellitus type 2 with complications ([address]     Hyperlipidemia with target LDL less than 100     Asthma     Allergic rhinitis     Essential hypertension     Major depression     Anxiety     Spondylisthesis     S/P lumbar fusion     Gastroesophageal reflux disease     Diabetic nephropathy associated with type 2 diabetes mellitus (HCC)     Moderate single current episode of major depressive disorder ([address]     Irritable bowel syndrome with diarrhea     Morbid obesity with BMI of 40.0-44.9, adult ([facility]

## 2018-12-20 DIAGNOSIS — F41.9 ANXIETY: ICD-10-CM

## 2018-12-20 DIAGNOSIS — M43.10 SPONDYLOLISTHESIS, UNSPECIFIED SPINAL REGION: ICD-10-CM

## 2018-12-20 DIAGNOSIS — Z98.1 S/P LUMBAR FUSION: ICD-10-CM

## 2018-12-20 RX ORDER — OXYCODONE HYDROCHLORIDE AND ACETAMINOPHEN 5; 325 MG/1; MG/1
1 TABLET ORAL DAILY PRN
Qty: 30 TABLET | Refills: 0 | Status: SHIPPED | OUTPATIENT
Start: 2018-12-20 | End: 2019-01-03 | Stop reason: SDUPTHER

## 2018-12-20 RX ORDER — LORAZEPAM 0.5 MG/1
0.5 TABLET ORAL 2 TIMES DAILY PRN
Qty: 180 TABLET | Refills: 0 | Status: SHIPPED | OUTPATIENT
Start: 2018-12-20 | End: 2019-08-09 | Stop reason: SDUPTHER

## 2019-01-03 DIAGNOSIS — M43.10 SPONDYLOLISTHESIS, UNSPECIFIED SPINAL REGION: ICD-10-CM

## 2019-01-03 DIAGNOSIS — Z98.1 S/P LUMBAR FUSION: ICD-10-CM

## 2019-01-03 RX ORDER — OXYCODONE HYDROCHLORIDE AND ACETAMINOPHEN 5; 325 MG/1; MG/1
1 TABLET ORAL DAILY PRN
Qty: 30 TABLET | Refills: 0 | Status: SHIPPED | OUTPATIENT
Start: 2019-01-03 | End: 2019-01-10 | Stop reason: SDUPTHER

## 2019-01-10 DIAGNOSIS — Z98.1 S/P LUMBAR FUSION: ICD-10-CM

## 2019-01-10 DIAGNOSIS — M43.10 SPONDYLOLISTHESIS, UNSPECIFIED SPINAL REGION: ICD-10-CM

## 2019-01-11 RX ORDER — OXYCODONE HYDROCHLORIDE AND ACETAMINOPHEN 5; 325 MG/1; MG/1
1 TABLET ORAL EVERY 6 HOURS PRN
Qty: 28 TABLET | Refills: 0 | Status: SHIPPED | OUTPATIENT
Start: 2019-01-11 | End: 2019-04-19 | Stop reason: SDUPTHER

## 2019-01-16 ENCOUNTER — OFFICE VISIT (OUTPATIENT)
Dept: FAMILY MEDICINE CLINIC | Age: 69
End: 2019-01-16
Payer: MEDICARE

## 2019-01-16 VITALS
SYSTOLIC BLOOD PRESSURE: 132 MMHG | RESPIRATION RATE: 21 BRPM | DIASTOLIC BLOOD PRESSURE: 76 MMHG | BODY MASS INDEX: 41.5 KG/M2 | WEIGHT: 238 LBS

## 2019-01-16 DIAGNOSIS — M25.511 CHRONIC PAIN OF BOTH SHOULDERS: ICD-10-CM

## 2019-01-16 DIAGNOSIS — G25.81 RESTLESS LEG SYNDROME: ICD-10-CM

## 2019-01-16 DIAGNOSIS — Z79.4 TYPE 2 DIABETES MELLITUS WITH COMPLICATION, WITH LONG-TERM CURRENT USE OF INSULIN (HCC): ICD-10-CM

## 2019-01-16 DIAGNOSIS — M43.10 SPONDYLOLISTHESIS, UNSPECIFIED SPINAL REGION: ICD-10-CM

## 2019-01-16 DIAGNOSIS — M25.552 BILATERAL HIP PAIN: ICD-10-CM

## 2019-01-16 DIAGNOSIS — E66.01 MORBID OBESITY WITH BMI OF 40.0-44.9, ADULT (HCC): ICD-10-CM

## 2019-01-16 DIAGNOSIS — F41.9 ANXIETY: ICD-10-CM

## 2019-01-16 DIAGNOSIS — G47.31 CENTRAL SLEEP APNEA: ICD-10-CM

## 2019-01-16 DIAGNOSIS — J30.1 CHRONIC SEASONAL ALLERGIC RHINITIS DUE TO POLLEN: ICD-10-CM

## 2019-01-16 DIAGNOSIS — M25.551 BILATERAL HIP PAIN: ICD-10-CM

## 2019-01-16 DIAGNOSIS — G47.33 OSA (OBSTRUCTIVE SLEEP APNEA): ICD-10-CM

## 2019-01-16 DIAGNOSIS — J45.20 MILD INTERMITTENT ASTHMA WITHOUT COMPLICATION: ICD-10-CM

## 2019-01-16 DIAGNOSIS — E11.8 TYPE 2 DIABETES MELLITUS WITH COMPLICATION, WITH LONG-TERM CURRENT USE OF INSULIN (HCC): ICD-10-CM

## 2019-01-16 DIAGNOSIS — M25.512 CHRONIC PAIN OF BOTH SHOULDERS: ICD-10-CM

## 2019-01-16 DIAGNOSIS — E53.8 B12 DEFICIENCY: ICD-10-CM

## 2019-01-16 DIAGNOSIS — R42 LIGHT-HEADED FEELING: ICD-10-CM

## 2019-01-16 DIAGNOSIS — Z98.1 S/P LUMBAR FUSION: ICD-10-CM

## 2019-01-16 DIAGNOSIS — E78.5 HYPERLIPIDEMIA WITH TARGET LDL LESS THAN 100: ICD-10-CM

## 2019-01-16 DIAGNOSIS — K21.9 GASTROESOPHAGEAL REFLUX DISEASE, ESOPHAGITIS PRESENCE NOT SPECIFIED: ICD-10-CM

## 2019-01-16 DIAGNOSIS — G89.29 CHRONIC PAIN OF LEFT KNEE: ICD-10-CM

## 2019-01-16 DIAGNOSIS — N18.30 CHRONIC KIDNEY DISEASE, STAGE III (MODERATE) (HCC): ICD-10-CM

## 2019-01-16 DIAGNOSIS — G89.29 CHRONIC BILATERAL THORACIC BACK PAIN: ICD-10-CM

## 2019-01-16 DIAGNOSIS — I10 ESSENTIAL HYPERTENSION: Primary | ICD-10-CM

## 2019-01-16 DIAGNOSIS — M54.6 CHRONIC BILATERAL THORACIC BACK PAIN: ICD-10-CM

## 2019-01-16 DIAGNOSIS — E11.21 DIABETIC NEPHROPATHY ASSOCIATED WITH TYPE 2 DIABETES MELLITUS (HCC): ICD-10-CM

## 2019-01-16 DIAGNOSIS — R26.89 BALANCE PROBLEM: ICD-10-CM

## 2019-01-16 DIAGNOSIS — F32.1 MODERATE SINGLE CURRENT EPISODE OF MAJOR DEPRESSIVE DISORDER (HCC): ICD-10-CM

## 2019-01-16 DIAGNOSIS — M25.551 RIGHT HIP PAIN: ICD-10-CM

## 2019-01-16 DIAGNOSIS — M25.562 CHRONIC PAIN OF LEFT KNEE: ICD-10-CM

## 2019-01-16 DIAGNOSIS — G89.29 CHRONIC PAIN OF BOTH SHOULDERS: ICD-10-CM

## 2019-01-16 PROCEDURE — G8427 DOCREV CUR MEDS BY ELIG CLIN: HCPCS | Performed by: PEDIATRICS

## 2019-01-16 PROCEDURE — 96372 THER/PROPH/DIAG INJ SC/IM: CPT | Performed by: PEDIATRICS

## 2019-01-16 PROCEDURE — 99214 OFFICE O/P EST MOD 30 MIN: CPT | Performed by: PEDIATRICS

## 2019-01-16 PROCEDURE — G8399 PT W/DXA RESULTS DOCUMENT: HCPCS | Performed by: PEDIATRICS

## 2019-01-16 PROCEDURE — G8482 FLU IMMUNIZE ORDER/ADMIN: HCPCS | Performed by: PEDIATRICS

## 2019-01-16 PROCEDURE — G8417 CALC BMI ABV UP PARAM F/U: HCPCS | Performed by: PEDIATRICS

## 2019-01-16 PROCEDURE — 1090F PRES/ABSN URINE INCON ASSESS: CPT | Performed by: PEDIATRICS

## 2019-01-16 PROCEDURE — 2022F DILAT RTA XM EVC RTNOPTHY: CPT | Performed by: PEDIATRICS

## 2019-01-16 PROCEDURE — 1036F TOBACCO NON-USER: CPT | Performed by: PEDIATRICS

## 2019-01-16 PROCEDURE — 3017F COLORECTAL CA SCREEN DOC REV: CPT | Performed by: PEDIATRICS

## 2019-01-16 PROCEDURE — 1101F PT FALLS ASSESS-DOCD LE1/YR: CPT | Performed by: PEDIATRICS

## 2019-01-16 PROCEDURE — 3046F HEMOGLOBIN A1C LEVEL >9.0%: CPT | Performed by: PEDIATRICS

## 2019-01-16 PROCEDURE — 1123F ACP DISCUSS/DSCN MKR DOCD: CPT | Performed by: PEDIATRICS

## 2019-01-16 PROCEDURE — 4040F PNEUMOC VAC/ADMIN/RCVD: CPT | Performed by: PEDIATRICS

## 2019-01-16 RX ORDER — METHYLPREDNISOLONE 4 MG/1
TABLET ORAL
Qty: 1 KIT | Refills: 0 | Status: SHIPPED | OUTPATIENT
Start: 2019-01-16 | End: 2019-01-22

## 2019-01-16 RX ORDER — CYANOCOBALAMIN 1000 UG/ML
1000 INJECTION INTRAMUSCULAR; SUBCUTANEOUS ONCE
Status: COMPLETED | OUTPATIENT
Start: 2019-01-16 | End: 2019-01-16

## 2019-01-16 RX ADMIN — CYANOCOBALAMIN 1000 MCG: 1000 INJECTION INTRAMUSCULAR; SUBCUTANEOUS at 17:06

## 2019-01-16 ASSESSMENT — ENCOUNTER SYMPTOMS
WHEEZING: 0
SHORTNESS OF BREATH: 0
SORE THROAT: 0
NAUSEA: 0
EYE DISCHARGE: 0
SINUS PRESSURE: 0
TROUBLE SWALLOWING: 0
DIARRHEA: 1
RHINORRHEA: 0
CHEST TIGHTNESS: 0
EYE PAIN: 0
ORTHOPNEA: 0
COUGH: 0
ABDOMINAL PAIN: 0
CHOKING: 0
BLOOD IN STOOL: 0
BLURRED VISION: 0
STRIDOR: 0
EYE REDNESS: 0
VOMITING: 0
COLOR CHANGE: 0
BACK PAIN: 1
CONSTIPATION: 0

## 2019-01-26 PROBLEM — N18.30 CHRONIC KIDNEY DISEASE, STAGE III (MODERATE) (HCC): Status: ACTIVE | Noted: 2019-01-26

## 2019-02-08 DIAGNOSIS — F32.9 MAJOR DEPRESSIVE DISORDER WITH CURRENT ACTIVE EPISODE, UNSPECIFIED DEPRESSION EPISODE SEVERITY, UNSPECIFIED WHETHER RECURRENT: Primary | ICD-10-CM

## 2019-02-11 RX ORDER — TRAZODONE HYDROCHLORIDE 100 MG/1
100 TABLET ORAL NIGHTLY
Qty: 180 TABLET | Refills: 1 | Status: SHIPPED | OUTPATIENT
Start: 2019-02-11 | End: 2020-11-17 | Stop reason: SDUPTHER

## 2019-02-26 DIAGNOSIS — I10 HYPERTENSION, UNSPECIFIED TYPE: Primary | ICD-10-CM

## 2019-02-28 RX ORDER — LOSARTAN POTASSIUM 100 MG/1
100 TABLET ORAL DAILY
Qty: 90 TABLET | Refills: 1 | Status: SHIPPED | OUTPATIENT
Start: 2019-02-28 | End: 2019-11-28 | Stop reason: SDUPTHER

## 2019-03-13 DIAGNOSIS — M43.10 SPONDYLOLISTHESIS, UNSPECIFIED SPINAL REGION: Primary | ICD-10-CM

## 2019-03-14 RX ORDER — TIZANIDINE 2 MG/1
TABLET ORAL
Qty: 270 TABLET | Refills: 1 | Status: SHIPPED | OUTPATIENT
Start: 2019-03-14 | End: 2020-01-10

## 2019-04-15 ENCOUNTER — HOSPITAL ENCOUNTER (OUTPATIENT)
Age: 69
Setting detail: SPECIMEN
Discharge: HOME | End: 2019-04-15
Payer: MEDICARE

## 2019-04-15 DIAGNOSIS — Z79.4 TYPE 2 DIABETES MELLITUS WITH COMPLICATION, WITH LONG-TERM CURRENT USE OF INSULIN (HCC): ICD-10-CM

## 2019-04-15 DIAGNOSIS — E11.8 TYPE 2 DIABETES MELLITUS WITH COMPLICATION, WITH LONG-TERM CURRENT USE OF INSULIN (HCC): ICD-10-CM

## 2019-04-15 DIAGNOSIS — R42 LIGHT-HEADED FEELING: ICD-10-CM

## 2019-04-15 DIAGNOSIS — K58.0 IRRITABLE BOWEL SYNDROME WITH DIARRHEA: ICD-10-CM

## 2019-04-15 DIAGNOSIS — E78.5 HYPERLIPIDEMIA WITH TARGET LDL LESS THAN 100: ICD-10-CM

## 2019-04-15 DIAGNOSIS — I10 ESSENTIAL HYPERTENSION: ICD-10-CM

## 2019-04-15 DIAGNOSIS — F32.1 MODERATE SINGLE CURRENT EPISODE OF MAJOR DEPRESSIVE DISORDER (HCC): ICD-10-CM

## 2019-04-15 DIAGNOSIS — G25.81 RESTLESS LEG SYNDROME: ICD-10-CM

## 2019-04-15 DIAGNOSIS — E61.1 LOW SERUM IRON: ICD-10-CM

## 2019-04-15 LAB
ALBUMIN SERPL-MCNC: 3.9 G/DL (ref 3.5–5.2)
ALBUMIN/GLOBULIN RATIO: 1.5 (ref 1–2.5)
ALP BLD-CCNC: 159 U/L (ref 35–104)
ALT SERPL-CCNC: 16 U/L (ref 5–33)
ANION GAP SERPL CALCULATED.3IONS-SCNC: 16 MMOL/L (ref 9–17)
AST SERPL-CCNC: 17 U/L
BILIRUB SERPL-MCNC: <0.1 MG/DL (ref 0.3–1.2)
BUN BLDV-MCNC: 27 MG/DL (ref 8–23)
BUN/CREAT BLD: ABNORMAL (ref 9–20)
CALCIUM SERPL-MCNC: 9.2 MG/DL (ref 8.6–10.4)
CHLORIDE BLD-SCNC: 106 MMOL/L (ref 98–107)
CHOLESTEROL/HDL RATIO: 3.9
CHOLESTEROL: 147 MG/DL
CO2: 19 MMOL/L (ref 20–31)
CREAT SERPL-MCNC: 1.38 MG/DL (ref 0.5–0.9)
GFR AFRICAN AMERICAN: 46 ML/MIN
GFR NON-AFRICAN AMERICAN: 38 ML/MIN
GFR SERPL CREATININE-BSD FRML MDRD: ABNORMAL ML/MIN/{1.73_M2}
GFR SERPL CREATININE-BSD FRML MDRD: ABNORMAL ML/MIN/{1.73_M2}
GLUCOSE BLD-MCNC: 92 MG/DL (ref 70–99)
HCT VFR BLD CALC: 37 % (ref 36.3–47.1)
HDLC SERPL-MCNC: 38 MG/DL
HEMOGLOBIN: 11.3 G/DL (ref 11.9–15.1)
IRON SATURATION: 14 % (ref 20–55)
IRON: 43 UG/DL (ref 37–145)
LDL CHOLESTEROL: 66 MG/DL (ref 0–130)
MCH RBC QN AUTO: 28.7 PG (ref 25.2–33.5)
MCHC RBC AUTO-ENTMCNC: 30.5 G/DL (ref 28.4–34.8)
MCV RBC AUTO: 93.9 FL (ref 82.6–102.9)
NRBC AUTOMATED: 0 PER 100 WBC
PDW BLD-RTO: 14.1 % (ref 11.8–14.4)
PLATELET # BLD: 240 K/UL (ref 138–453)
PMV BLD AUTO: 10.2 FL (ref 8.1–13.5)
POTASSIUM SERPL-SCNC: 4.8 MMOL/L (ref 3.7–5.3)
RBC # BLD: 3.94 M/UL (ref 3.95–5.11)
SODIUM BLD-SCNC: 141 MMOL/L (ref 135–144)
TOTAL IRON BINDING CAPACITY: 309 UG/DL (ref 250–450)
TOTAL PROTEIN: 6.5 G/DL (ref 6.4–8.3)
TRIGL SERPL-MCNC: 216 MG/DL
TSH SERPL DL<=0.05 MIU/L-ACNC: 5.85 MIU/L (ref 0.3–5)
UNSATURATED IRON BINDING CAPACITY: 266 UG/DL (ref 112–347)
VLDLC SERPL CALC-MCNC: ABNORMAL MG/DL (ref 1–30)
WBC # BLD: 8.6 K/UL (ref 3.5–11.3)

## 2019-04-16 ENCOUNTER — OFFICE VISIT (OUTPATIENT)
Dept: ORTHOPEDIC SURGERY | Age: 69
End: 2019-04-16
Payer: MEDICARE

## 2019-04-16 VITALS — BODY MASS INDEX: 40.65 KG/M2 | HEIGHT: 64 IN | WEIGHT: 238.1 LBS

## 2019-04-16 DIAGNOSIS — M25.511 CHRONIC RIGHT SHOULDER PAIN: ICD-10-CM

## 2019-04-16 DIAGNOSIS — G89.29 CHRONIC RIGHT SHOULDER PAIN: ICD-10-CM

## 2019-04-16 DIAGNOSIS — M75.41 IMPINGEMENT SYNDROME OF RIGHT SHOULDER REGION: Primary | ICD-10-CM

## 2019-04-16 PROCEDURE — 99203 OFFICE O/P NEW LOW 30 MIN: CPT | Performed by: ORTHOPAEDIC SURGERY

## 2019-04-16 PROCEDURE — G8427 DOCREV CUR MEDS BY ELIG CLIN: HCPCS | Performed by: ORTHOPAEDIC SURGERY

## 2019-04-16 PROCEDURE — 1036F TOBACCO NON-USER: CPT | Performed by: ORTHOPAEDIC SURGERY

## 2019-04-16 PROCEDURE — 1123F ACP DISCUSS/DSCN MKR DOCD: CPT | Performed by: ORTHOPAEDIC SURGERY

## 2019-04-16 PROCEDURE — 3017F COLORECTAL CA SCREEN DOC REV: CPT | Performed by: ORTHOPAEDIC SURGERY

## 2019-04-16 PROCEDURE — 1090F PRES/ABSN URINE INCON ASSESS: CPT | Performed by: ORTHOPAEDIC SURGERY

## 2019-04-16 PROCEDURE — G8399 PT W/DXA RESULTS DOCUMENT: HCPCS | Performed by: ORTHOPAEDIC SURGERY

## 2019-04-16 PROCEDURE — 4040F PNEUMOC VAC/ADMIN/RCVD: CPT | Performed by: ORTHOPAEDIC SURGERY

## 2019-04-16 PROCEDURE — 20610 DRAIN/INJ JOINT/BURSA W/O US: CPT | Performed by: ORTHOPAEDIC SURGERY

## 2019-04-16 PROCEDURE — G8417 CALC BMI ABV UP PARAM F/U: HCPCS | Performed by: ORTHOPAEDIC SURGERY

## 2019-04-16 RX ORDER — METHYLPREDNISOLONE ACETATE 40 MG/ML
40 INJECTION, SUSPENSION INTRA-ARTICULAR; INTRALESIONAL; INTRAMUSCULAR; SOFT TISSUE ONCE
Status: COMPLETED | OUTPATIENT
Start: 2019-04-16 | End: 2019-04-16

## 2019-04-16 RX ADMIN — METHYLPREDNISOLONE ACETATE 40 MG: 40 INJECTION, SUSPENSION INTRA-ARTICULAR; INTRALESIONAL; INTRAMUSCULAR; SOFT TISSUE at 11:32

## 2019-04-16 NOTE — PROGRESS NOTES
Chief Complaint   Patient presents with    Shoulder Pain     rt shoulder pain, pt stated she think she may have torn her rotator cuff, but not sure when     This 64-year-old patient is seen here because of pain in the right shoulder that she says she has had it for a year. There is no history of injury. The pain is located over the posterior aspect of the shoulder and also some over the lateral aspect. She also does get some tingling sensation mainly in the long and the ring fingers. This has been of more recent event. For the last 2 years at night she has been sleeping in the lazy boy because of her back pain. The patient has been getting 30 Percocet to be used over 90 days by her primary care physician and in the interim uses Motrin 800 mg 2-3 times a day. Examination: Cervical spine examination showed excellent range of motion she did feel a stretching sensation on lateral bending to the left and rotation to the left. The stretching sensation was felt over the right side of the neck. Shoulder examination shows she has multiple scratches and she does this because she is nervous. However none of them were infected. Her shoulder range of motion is excellent but she does demonstrate impingement signs particularly with abduction actively. Neurologically her sensation appeared to be normal there was no thenar wasting. Her wrist motions were excellent. X-rays: X-ray of the shoulder show the patient does have acromioclavicular joint arthrosis but no other abnormality. Diagnosis: Impingement syndrome right shoulder. #2 acromioclavicular joint arthritis. #3 possible carpal tunnel syndrome. #4 cervical spondylosis. Treatment: Under sterile conditions I injected 40 mg Depo-Medrol and 5 mL of 1% plain lidocaine and the subcu acromial bursa.     I will see her again in one week's time and if not improved then she'll require x-rays of the cervical spine and the shoulder and possible EMG nerve conduction studies.

## 2019-04-17 ENCOUNTER — TELEPHONE (OUTPATIENT)
Dept: FAMILY MEDICINE CLINIC | Age: 69
End: 2019-04-17

## 2019-04-17 DIAGNOSIS — E61.1 LOW SERUM IRON: ICD-10-CM

## 2019-04-17 DIAGNOSIS — E11.8 TYPE 2 DIABETES MELLITUS WITH COMPLICATION, WITH LONG-TERM CURRENT USE OF INSULIN (HCC): ICD-10-CM

## 2019-04-17 DIAGNOSIS — R79.89 ELEVATED TSH: ICD-10-CM

## 2019-04-17 DIAGNOSIS — I10 HYPERTENSION, UNSPECIFIED TYPE: ICD-10-CM

## 2019-04-17 DIAGNOSIS — E03.8 TSH (THYROID-STIMULATING HORMONE DEFICIENCY): Primary | ICD-10-CM

## 2019-04-17 DIAGNOSIS — Z79.4 TYPE 2 DIABETES MELLITUS WITH COMPLICATION, WITH LONG-TERM CURRENT USE OF INSULIN (HCC): ICD-10-CM

## 2019-04-17 DIAGNOSIS — E78.5 HYPERLIPIDEMIA WITH TARGET LDL LESS THAN 100: ICD-10-CM

## 2019-04-17 NOTE — TELEPHONE ENCOUNTER
Lab orders for 3 month draw pending to be signed. Patient is aware and will complete at our lab in 3 mos. cm

## 2019-04-19 ENCOUNTER — OFFICE VISIT (OUTPATIENT)
Dept: FAMILY MEDICINE CLINIC | Age: 69
End: 2019-04-19
Payer: MEDICARE

## 2019-04-19 VITALS
SYSTOLIC BLOOD PRESSURE: 121 MMHG | RESPIRATION RATE: 18 BRPM | BODY MASS INDEX: 41.14 KG/M2 | DIASTOLIC BLOOD PRESSURE: 76 MMHG | TEMPERATURE: 97.5 F | WEIGHT: 236 LBS

## 2019-04-19 DIAGNOSIS — M54.6 CHRONIC BILATERAL THORACIC BACK PAIN: ICD-10-CM

## 2019-04-19 DIAGNOSIS — M25.512 CHRONIC PAIN OF BOTH SHOULDERS: ICD-10-CM

## 2019-04-19 DIAGNOSIS — E53.8 B12 DEFICIENCY: ICD-10-CM

## 2019-04-19 DIAGNOSIS — M25.551 BILATERAL HIP PAIN: ICD-10-CM

## 2019-04-19 DIAGNOSIS — E78.5 HYPERLIPIDEMIA WITH TARGET LDL LESS THAN 100: ICD-10-CM

## 2019-04-19 DIAGNOSIS — J45.20 MILD INTERMITTENT ASTHMA WITHOUT COMPLICATION: ICD-10-CM

## 2019-04-19 DIAGNOSIS — F41.9 ANXIETY: ICD-10-CM

## 2019-04-19 DIAGNOSIS — F32.1 MODERATE SINGLE CURRENT EPISODE OF MAJOR DEPRESSIVE DISORDER (HCC): ICD-10-CM

## 2019-04-19 DIAGNOSIS — G47.33 OSA (OBSTRUCTIVE SLEEP APNEA): ICD-10-CM

## 2019-04-19 DIAGNOSIS — E11.21 DIABETIC NEPHROPATHY ASSOCIATED WITH TYPE 2 DIABETES MELLITUS (HCC): ICD-10-CM

## 2019-04-19 DIAGNOSIS — G47.31 CENTRAL SLEEP APNEA: ICD-10-CM

## 2019-04-19 DIAGNOSIS — G89.29 CHRONIC PAIN OF LEFT KNEE: ICD-10-CM

## 2019-04-19 DIAGNOSIS — I10 ESSENTIAL HYPERTENSION: Primary | ICD-10-CM

## 2019-04-19 DIAGNOSIS — M43.10 SPONDYLOLISTHESIS, UNSPECIFIED SPINAL REGION: ICD-10-CM

## 2019-04-19 DIAGNOSIS — K21.9 GASTROESOPHAGEAL REFLUX DISEASE, ESOPHAGITIS PRESENCE NOT SPECIFIED: ICD-10-CM

## 2019-04-19 DIAGNOSIS — N18.30 CHRONIC KIDNEY DISEASE, STAGE III (MODERATE) (HCC): ICD-10-CM

## 2019-04-19 DIAGNOSIS — Z98.1 S/P LUMBAR FUSION: ICD-10-CM

## 2019-04-19 DIAGNOSIS — G89.29 CHRONIC PAIN OF BOTH SHOULDERS: ICD-10-CM

## 2019-04-19 DIAGNOSIS — M25.552 BILATERAL HIP PAIN: ICD-10-CM

## 2019-04-19 DIAGNOSIS — M25.511 CHRONIC PAIN OF BOTH SHOULDERS: ICD-10-CM

## 2019-04-19 DIAGNOSIS — E66.01 MORBID OBESITY WITH BMI OF 40.0-44.9, ADULT (HCC): ICD-10-CM

## 2019-04-19 DIAGNOSIS — J30.1 CHRONIC SEASONAL ALLERGIC RHINITIS DUE TO POLLEN: ICD-10-CM

## 2019-04-19 DIAGNOSIS — G89.29 CHRONIC BILATERAL THORACIC BACK PAIN: ICD-10-CM

## 2019-04-19 DIAGNOSIS — M25.562 CHRONIC PAIN OF LEFT KNEE: ICD-10-CM

## 2019-04-19 DIAGNOSIS — G25.81 RESTLESS LEG SYNDROME: ICD-10-CM

## 2019-04-19 PROCEDURE — 1090F PRES/ABSN URINE INCON ASSESS: CPT | Performed by: PEDIATRICS

## 2019-04-19 PROCEDURE — 2022F DILAT RTA XM EVC RTNOPTHY: CPT | Performed by: PEDIATRICS

## 2019-04-19 PROCEDURE — 3017F COLORECTAL CA SCREEN DOC REV: CPT | Performed by: PEDIATRICS

## 2019-04-19 PROCEDURE — 1123F ACP DISCUSS/DSCN MKR DOCD: CPT | Performed by: PEDIATRICS

## 2019-04-19 PROCEDURE — 3046F HEMOGLOBIN A1C LEVEL >9.0%: CPT | Performed by: PEDIATRICS

## 2019-04-19 PROCEDURE — 1036F TOBACCO NON-USER: CPT | Performed by: PEDIATRICS

## 2019-04-19 PROCEDURE — G8399 PT W/DXA RESULTS DOCUMENT: HCPCS | Performed by: PEDIATRICS

## 2019-04-19 PROCEDURE — G8427 DOCREV CUR MEDS BY ELIG CLIN: HCPCS | Performed by: PEDIATRICS

## 2019-04-19 PROCEDURE — 99214 OFFICE O/P EST MOD 30 MIN: CPT | Performed by: PEDIATRICS

## 2019-04-19 PROCEDURE — 4040F PNEUMOC VAC/ADMIN/RCVD: CPT | Performed by: PEDIATRICS

## 2019-04-19 PROCEDURE — G8417 CALC BMI ABV UP PARAM F/U: HCPCS | Performed by: PEDIATRICS

## 2019-04-19 RX ORDER — OXYCODONE HYDROCHLORIDE AND ACETAMINOPHEN 5; 325 MG/1; MG/1
1 TABLET ORAL EVERY 6 HOURS PRN
Qty: 28 TABLET | Refills: 0 | Status: SHIPPED | OUTPATIENT
Start: 2019-04-19 | End: 2019-04-26

## 2019-04-19 ASSESSMENT — ENCOUNTER SYMPTOMS
SORE THROAT: 0
VOMITING: 0
BELCHING: 0
SINUS PRESSURE: 0
EYE REDNESS: 0
CONSTIPATION: 0
SHORTNESS OF BREATH: 0
BACK PAIN: 1
COUGH: 0
DIARRHEA: 1
HEARTBURN: 0
CHEST TIGHTNESS: 0
EYE DISCHARGE: 0
EYE PAIN: 0
BLOOD IN STOOL: 0
ABDOMINAL PAIN: 0
GLOBUS SENSATION: 0
WHEEZING: 0
WATER BRASH: 0
CHOKING: 0
STRIDOR: 0
RHINORRHEA: 0
TROUBLE SWALLOWING: 0
COLOR CHANGE: 0
NAUSEA: 0
HOARSE VOICE: 0

## 2019-04-19 NOTE — PROGRESS NOTES
Subjective:      Patient ID: Ignacio Ferrer is a 68 y.o. female. Visit Information    Have you changed or started any medications since your last visit including any over-the-counter medicines, vitamins, or herbal medicines? no   Are you having any side effects from any of your medications? -  no  Have you stopped taking any of your medications? Is so, why? -  no    Have you seen any other physician or provider since your last visit? Dr. Keysha Gastelum  Have you had any other diagnostic tests since your last visit? No  Have you been seen in the emergency room and/or had an admission to a hospital since we last saw you? No  Have you had your routine dental cleaning in the past 6 months? yes -     Have you activated your [employer] account? If not, what are your barriers?  Yes     Patient Care Team:  Verónica Orellana MD as PCP - General (Internal Medicine)  Verónica Orellana MD as PCP - S Attributed Provider  Vinicio Velasquez MD as Consulting Physician (Nephrology)  Wagner Bill MD as Consulting Physician (Pulmonology)  MD Sergei Patino MD as Consulting Physician (Endocrinology)  Rossy Boyd MD as Consulting Physician (Gastroenterology)    Medical History Review  Past Medical, Family, and Social History reviewed and does contribute to the patient presenting condition    Health Maintenance   Topic Date Due    DTaP/Tdap/Td vaccine (1 - Tdap) 02/03/1967    Diabetic retinal exam  08/08/2017    Breast cancer screen  06/14/2019 (Originally 2/12/2018)    Shingles Vaccine (1 of 2) 04/19/2020 (Originally 2/3/1998)    Diabetic foot exam  06/29/2019    A1C test (Diabetic or Prediabetic)  09/10/2019    Lipid screen  04/15/2020    Potassium monitoring  04/15/2020    Creatinine monitoring  04/15/2020    Colon cancer screen colonoscopy  01/23/2024    Flu vaccine  Completed    Pneumococcal 65+ years Vaccine  Completed    Hepatitis C screen  Completed    DEXA (modify frequency per FRAX score)  Addressed       PHQ Scores 8/4/2017   PHQ2 Score 2   PHQ9 Score 2     Interpretation of Total Score DepressionSeverity: 1-4 = Minimal depression, 5-9 = Mild depression, 10-14 = Moderate depression, 15-19 = Moderately severe depression, 20-27 = Severe depression    Current Outpatient Medications   Medication Sig Dispense Refill    oxyCODONE-acetaminophen (PERCOCET) 5-325 MG per tablet Take 1 tablet by mouth every 6 hours as needed for Pain for up to 7 days. 28 tablet 0    tiZANidine (ZANAFLEX) 2 MG tablet TAKE 1 TABLET EVERY 8 HOURS AS NEEDED FOR MUSCLE SPASM(S) 270 tablet 1    losartan (COZAAR) 100 MG tablet Take 1 tablet by mouth daily 90 tablet 1    traZODone (DESYREL) 100 MG tablet Take 1 tablet by mouth nightly Take 1 to 2 Tablets every night 180 tablet 1    LORazepam (ATIVAN) 0.5 MG tablet Take 1 tablet by mouth 2 times daily as needed for Anxiety. . 180 tablet 0    gabapentin (NEURONTIN) 300 MG capsule Take 1 capsule by mouth 3 times daily. . 270 capsule 1    atorvastatin (LIPITOR) 80 MG tablet Take 1 tablet by mouth daily 90 tablet 1    citalopram (CELEXA) 40 MG tablet Take 1 tablet by mouth daily 90 tablet 1    ranitidine (ZANTAC) 150 MG tablet Take 1 tablet by mouth nightly 90 tablet 1    metFORMIN (GLUCOPHAGE) 500 MG tablet Take 500 mg by mouth 2 times daily (with meals)      BD INSULIN SYRINGE ULTRAFINE 31G X 15/64\" 0.5 ML MISC       NOVOLIN 70/30 (70-30) 100 UNIT/ML injection vial Inject into the skin 2 times daily (with meals) 25 in the AM 22 at night      ACCU-CHEK TARIQ PLUS strip       aspirin 81 MG tablet Take 1 tablet by mouth nightly 30 tablet 3    glimepiride (AMARYL) 4 MG tablet Take 1 tablet by mouth every morning (before breakfast)       B-D ULTRAFINE III SHORT PEN 31G X 8 MM MISC       omeprazole (PRILOSEC) 20 MG capsule Take 20 mg by mouth nightly Indications: GERD-Related Laryngitis       albuterol (PROVENTIL) (2.5 MG/3ML) 0.083% nebulizer solution Take 2.5 mg by nebulization every 4 hours as needed for Wheezing.  Multiple Vitamins-Minerals (MULTI COMPLETE PO) Take 1 tablet by mouth nightly       Probiotic Product ([address]) Take 1 tablet by mouth nightly        No current facility-administered medications for this visit. HPI:      Shot didn't help   Middle and ring fingers   Motrin 3x/daily   Sleeps in the lazy boy  Hip and back pain         Patient presents today for routine follow up of her chronic medical problems including hypertension, hyperlipidemia, asthma, allergies, KRISHNA, central sleep apnea, restless leg syndrome, GERD, depression, anxiety, spondylolisthesis s/p lumbar fusion. Her blood pressure is well controlled on losartan. She is taking and tolerating lipitor for cholesterol without side effects. Her asthma and allergies are well controlled with singulair. Her KRISHNA and central sleep apnea is treated now with a servovent and she is feeling much better. Makayla Narayan is following with her pulmonologist, Dr. Marissa Garcia.    Her GERD is controlled with prilosec and zantac. She states her depression and anxiety is well controlled with celexa daily and ativan as needed. She is somewhat stressed with her significant other's illnesses. Makayla Narayan is quite stressed because she is afraid to leave the house. Her restless leg is stable with neurontin. She also underwent a posterior lumbar interbody fusion of L4/L5 due to spondylolisthesis in 2016. She does have low back pain intermittently. She is currently taking Percocet only as needed and Neurontin and tizanidine twice daily. This combination of medications is controlling her back pain fairly well. She tells me that her back does continue to bother her.    Her diabetes type 2 with nephropathy is followed by her endocrinologist, Dr. Dominic Funk. Makayla Narayan takes insulin 70/30 - 25 units in the am and 22 units at night (if her sugar is under 115 at night she will only take 12 units).  Her eye exam is mee Chahal last HgA1c was 7.0. She does complain of bilateral shoulder pain worse on the right still because she has not been able to do therapy. She did see Dr. Dorina Flor and had an injection which was not helpful. She does follow up with him next week. She does have some bilateral hip pain at the end of the day. She also has left knee pain.    She does still get some diarrhea and will have to take immodium once weekly.    She does have a B12 deficiency and did have a B12 injection at her last visit. We will recheck her B12 level with her next labs. She does have a history of obesity and she is trying to work on this. Her diabetes type 2 is complicated by diabetic nephropathy and CKD stage 3. These are stable. She has no other concerns at this time. cmw        Hyperlipidemia   This is a chronic problem. The current episode started more than 1 year ago. Exacerbating diseases include diabetes and obesity. She has no history of hypothyroidism. Pertinent negatives include no chest pain, focal sensory loss, focal weakness, leg pain, myalgias or shortness of breath. Current antihyperlipidemic treatment includes statins. The current treatment provides mild improvement of lipids. Compliance problems include adherence to exercise. Risk factors for coronary artery disease include diabetes mellitus, obesity, stress and a sedentary lifestyle. Diabetes   She presents for her follow-up diabetic visit. She has type 2 diabetes mellitus. No MedicAlert identification noted. Hypoglycemia symptoms include nervousness/anxiousness (stable). Pertinent negatives for hypoglycemia include no confusion, dizziness, headaches or tremors. There are no diabetic associated symptoms. Pertinent negatives for diabetes include no chest pain, no fatigue, no polydipsia, no polyphagia, no polyuria, no weakness and no weight loss. There are no hypoglycemic complications. There are no diabetic complications.  Risk factors for coronary artery disease include diabetes mellitus, obesity, sedentary lifestyle and stress. Current diabetic treatment includes diet, insulin injections and oral agent (dual therapy). She is compliant with treatment all of the time. Her weight is stable. She is following a diabetic diet. When asked about meal planning, she reported none. She rarely participates in exercise. She does not see a podiatrist.Eye exam is current. Gastroesophageal Reflux   She reports no abdominal pain, no belching, no chest pain, no choking, no coughing, no dysphagia, no early satiety, no globus sensation, no heartburn, no hoarse voice, no nausea, no sore throat, no stridor, no tooth decay, no water brash or no wheezing. This is a chronic problem. The current episode started more than 1 year ago. The problem occurs occasionally. The problem has been gradually improving. Nothing aggravates the symptoms. Pertinent negatives include no anemia, fatigue, melena, muscle weakness, orthopnea or weight loss. Risk factors include obesity. She has tried an antacid for the symptoms. The treatment provided mild relief. Review of Systems   Constitutional: Negative for appetite change, fatigue, fever and weight loss. HENT: Negative for congestion, ear pain, hoarse voice, postnasal drip, rhinorrhea, sinus pressure, sneezing, sore throat, tinnitus and trouble swallowing. Eyes: Negative for pain, discharge and redness. Respiratory: Negative for cough, choking, chest tightness, shortness of breath, wheezing and stridor. Asthma well controlled with singulair daily and albuterol as needed. KRISHNA / central sleep apnea controlled with Servovent nightly - she follows up with Dr. Susan Francis. Cardiovascular: Negative for chest pain, palpitations and leg swelling. Gastrointestinal: Positive for diarrhea (secondary to irritable bowel - improved with Zenitum but she does have intermittent episodes once per week).  Negative for abdominal regular rhythm and normal heart sounds. No murmur heard. Pulmonary/Chest: Effort normal and breath sounds normal. No stridor. She has no wheezes. She has no rales. Abdominal: Soft. Bowel sounds are normal. She exhibits no mass. There is no tenderness. Musculoskeletal: She exhibits no edema. Right shoulder: She exhibits decreased range of motion, tenderness and decreased strength. Left shoulder: She exhibits decreased range of motion and tenderness. Right hip: She exhibits tenderness. She exhibits normal strength. Left hip: She exhibits tenderness. She exhibits normal strength. Right knee: Normal.        Left knee: She exhibits decreased range of motion. Tenderness found. Medial joint line tenderness noted. Back:         Legs:  Lymphadenopathy:     She has no cervical adenopathy. Neurological: She is alert and oriented to person, place, and time. Skin: Skin is warm. Capillary refill takes less than 2 seconds. No rash noted. She is not diaphoretic. Psychiatric: Her speech is normal and behavior is normal. Judgment and thought content normal. Her mood appears anxious. Cognition and memory are normal. She exhibits a depressed mood. Tearful at times   Nursing note and vitals reviewed. Assessment:      Diagnosis Orders   1. Essential hypertension     2. Hyperlipidemia with target LDL less than 100     3. Mild intermittent asthma without complication     4. Chronic seasonal allergic rhinitis due to pollen     5. KRISHNA (obstructive sleep apnea)     6. Central sleep apnea     7. Restless leg syndrome     8. Gastroesophageal reflux disease, esophagitis presence not specified     9. Moderate single current episode of major depressive disorder ([facility] [address]     10. Anxiety     11. Spondylolisthesis, unspecified spinal region  oxyCODONE-acetaminophen (PERCOCET) 5-325 MG per tablet   12. S/P lumbar fusion  oxyCODONE-acetaminophen (PERCOCET) 5-325 MG per tablet   13.  Chronic more falls in past year? no no no no no no   Fall with injury in past year? no yes no no yes no     The patient does not have a history of falls. I did not - not indicated , complete a risk assessment for falls.  A plan of care for falls No Treatment plan indicated    Lab Results   Component Value Date    LDLCALC 82 07/29/2013    LDLCHOLESTEROL 66 04/15/2019    (goal LDL reduction with dx if diabetes is 50% LDL reduction)    PHQ Scores 8/4/2017   PHQ2 Score 2   PHQ9 Score 2     Interpretation of Total Score Depression Severity: 1-4 = Minimal depression, 5-9 = Mild depression, 10-14 = Moderate depression, 15-19 = Moderately severe depression, 20-27 = Severe depression            Electronically signed by Julissa Sheppard MD on 4/23/2019 at 7:30 PM

## 2019-04-24 ENCOUNTER — TELEPHONE (OUTPATIENT)
Dept: FAMILY MEDICINE CLINIC | Age: 69
End: 2019-04-24

## 2019-04-25 ENCOUNTER — OFFICE VISIT (OUTPATIENT)
Dept: ORTHOPEDIC SURGERY | Age: 69
End: 2019-04-25
Payer: MEDICARE

## 2019-04-25 ENCOUNTER — HOSPITAL ENCOUNTER (OUTPATIENT)
Dept: GENERAL RADIOLOGY | Age: 69
Discharge: HOME | End: 2019-04-27
Payer: MEDICARE

## 2019-04-25 ENCOUNTER — HOSPITAL ENCOUNTER (OUTPATIENT)
Age: 69
Discharge: HOME | End: 2019-04-27
Payer: MEDICARE

## 2019-04-25 VITALS — WEIGHT: 235.89 LBS | HEIGHT: 64 IN | BODY MASS INDEX: 40.27 KG/M2

## 2019-04-25 DIAGNOSIS — M25.511 CHRONIC RIGHT SHOULDER PAIN: ICD-10-CM

## 2019-04-25 DIAGNOSIS — M75.41 IMPINGEMENT SYNDROME OF RIGHT SHOULDER REGION: Primary | ICD-10-CM

## 2019-04-25 DIAGNOSIS — M47.812 SPONDYLOSIS OF CERVICAL REGION WITHOUT MYELOPATHY OR RADICULOPATHY: ICD-10-CM

## 2019-04-25 DIAGNOSIS — G89.29 CHRONIC RIGHT SHOULDER PAIN: ICD-10-CM

## 2019-04-25 DIAGNOSIS — M75.41 IMPINGEMENT SYNDROME OF RIGHT SHOULDER REGION: ICD-10-CM

## 2019-04-25 PROCEDURE — 4040F PNEUMOC VAC/ADMIN/RCVD: CPT | Performed by: ORTHOPAEDIC SURGERY

## 2019-04-25 PROCEDURE — 1090F PRES/ABSN URINE INCON ASSESS: CPT | Performed by: ORTHOPAEDIC SURGERY

## 2019-04-25 PROCEDURE — 72040 X-RAY EXAM NECK SPINE 2-3 VW: CPT

## 2019-04-25 PROCEDURE — 73030 X-RAY EXAM OF SHOULDER: CPT

## 2019-04-25 PROCEDURE — 1036F TOBACCO NON-USER: CPT | Performed by: ORTHOPAEDIC SURGERY

## 2019-04-25 PROCEDURE — 99212 OFFICE O/P EST SF 10 MIN: CPT | Performed by: ORTHOPAEDIC SURGERY

## 2019-04-25 PROCEDURE — G8427 DOCREV CUR MEDS BY ELIG CLIN: HCPCS | Performed by: ORTHOPAEDIC SURGERY

## 2019-04-25 PROCEDURE — 3017F COLORECTAL CA SCREEN DOC REV: CPT | Performed by: ORTHOPAEDIC SURGERY

## 2019-04-25 PROCEDURE — G8399 PT W/DXA RESULTS DOCUMENT: HCPCS | Performed by: ORTHOPAEDIC SURGERY

## 2019-04-25 PROCEDURE — G8417 CALC BMI ABV UP PARAM F/U: HCPCS | Performed by: ORTHOPAEDIC SURGERY

## 2019-04-25 PROCEDURE — 1123F ACP DISCUSS/DSCN MKR DOCD: CPT | Performed by: ORTHOPAEDIC SURGERY

## 2019-05-13 ENCOUNTER — TELEPHONE (OUTPATIENT)
Dept: ORTHOPEDIC SURGERY | Age: 69
End: 2019-05-13

## 2019-05-13 NOTE — TELEPHONE ENCOUNTER
Patient wishes to reschedule 05/16/19 appointment as has not begun therapy as yet.  Appointment scheduled for June 13

## 2019-06-08 DIAGNOSIS — F41.9 ANXIETY: ICD-10-CM

## 2019-06-08 DIAGNOSIS — G25.81 RESTLESS LEG SYNDROME: ICD-10-CM

## 2019-06-11 RX ORDER — GABAPENTIN 300 MG/1
300 CAPSULE ORAL 3 TIMES DAILY
Qty: 270 CAPSULE | Refills: 1 | Status: SHIPPED | OUTPATIENT
Start: 2019-06-11 | End: 2020-06-18

## 2019-06-11 RX ORDER — CITALOPRAM 40 MG/1
40 TABLET ORAL DAILY
Qty: 90 TABLET | Refills: 1 | Status: SHIPPED | OUTPATIENT
Start: 2019-06-11 | End: 2019-11-13 | Stop reason: SDUPTHER

## 2019-06-11 NOTE — TELEPHONE ENCOUNTER
LRF 2-11-19 # 180 RF 0  LOV 4-19-19  RTO 3 mo, scheduled    Health Maintenance   Topic Date Due    DTaP/Tdap/Td vaccine (1 - Tdap) 02/03/1967    Diabetic retinal exam  08/08/2017    Breast cancer screen  06/14/2019 (Originally 2/12/2018)    Shingles Vaccine (1 of 2) 04/19/2020 (Originally 2/3/1998)    Diabetic foot exam  06/29/2019    A1C test (Diabetic or Prediabetic)  09/10/2019    Lipid screen  04/15/2020    Potassium monitoring  04/15/2020    Creatinine monitoring  04/15/2020    Colon cancer screen colonoscopy  01/23/2024    Flu vaccine  Completed    Pneumococcal 65+ years Vaccine  Completed    Hepatitis C screen  Completed    DEXA (modify frequency per FRAX score)  Addressed             (applicable per patient's age: Cancer Screenings, Depression Screening, Fall Risk Screening, Immunizations)    Hemoglobin A1C (%)   Date Value   09/10/2018 7.1 (H)   06/29/2018 6.7   02/15/2018 6.7     Microalb/Crt.  Ratio (mcg/mg creat)   Date Value   09/10/2018 13   09/10/2018 13     LDL Cholesterol (mg/dL)   Date Value   04/15/2019 66     LDL Calculated (mg/dL)   Date Value   07/29/2013 82     AST (U/L)   Date Value   04/15/2019 17     ALT (U/L)   Date Value   04/15/2019 16     BUN (mg/dL)   Date Value   04/15/2019 27 (H)      (goal A1C is < 7)   (goal LDL is <100) need 30-50% reduction from baseline     BP Readings from Last 3 Encounters:   04/19/19 121/76   01/16/19 132/76   11/05/18 120/74    (goal /80)      All Future Testing planned in [facility]:  Lab Frequency Next Occurrence   XR THORACIC SPINE (MIN 4 VIEWS) Once 06/13/2018   XR HIP RIGHT (2-3 VIEWS) Once 01/16/2019   Comprehensive Metabolic Panel Once 85/88/3227   Hemoglobin A1C Once 07/17/2019   Microalbumin, Ur Once 07/17/2019   CBC Once 07/17/2019   Ferritin Once 07/17/2019   Iron and TIBC Once 07/17/2019   T4, Free Once 07/17/2019   Thyroid Antibodies Once 07/17/2019   Vitamin B12 Once 04/23/2019       Next Visit Date:  Future Appointments   Date Time Provider Department Center   6/20/2019  1:00 PM Carol Jauregui MD [facility]   8/7/2019  2:40 PM MD [facility] Tinoco            Patient Active Problem List:     Restless leg syndrome     KRISHNA on CPAP     Diabetes mellitus type 2 with complications ([address]     Hyperlipidemia with target LDL less than 100     Asthma     Allergic rhinitis     Essential hypertension     Major depression     Anxiety     Spondylisthesis     S/P lumbar fusion     Gastroesophageal reflux disease     Diabetic nephropathy associated with type 2 diabetes mellitus (HCC)     Moderate single current episode of major depressive disorder ([address]     Irritable bowel syndrome with diarrhea     Morbid obesity with BMI of 40.0-44.9, adult ([address]     Chronic kidney disease, stage III (moderate) ([address]

## 2019-06-11 NOTE — TELEPHONE ENCOUNTER
[facility] 12-11-18 # 270 RF 1  LOV 4-19-19  RTO 3 mo, scheduled    Health Maintenance   Topic Date Due    DTaP/Tdap/Td vaccine (1 - Tdap) 02/03/1967    Diabetic retinal exam  08/08/2017    Breast cancer screen  06/14/2019 (Originally 2/12/2018)    Shingles Vaccine (1 of 2) 04/19/2020 (Originally 2/3/1998)    Diabetic foot exam  06/29/2019    A1C test (Diabetic or Prediabetic)  09/10/2019    Lipid screen  04/15/2020    Potassium monitoring  04/15/2020    Creatinine monitoring  04/15/2020    Colon cancer screen colonoscopy  01/23/2024    Flu vaccine  Completed    Pneumococcal 65+ years Vaccine  Completed    Hepatitis C screen  Completed    DEXA (modify frequency per FRAX score)  Addressed             (applicable per patient's age: Cancer Screenings, Depression Screening, Fall Risk Screening, Immunizations)    Hemoglobin A1C (%)   Date Value   09/10/2018 7.1 (H)   06/29/2018 6.7   02/15/2018 6.7     Microalb/Crt.  Ratio (mcg/mg creat)   Date Value   09/10/2018 13   09/10/2018 13     LDL Cholesterol (mg/dL)   Date Value   04/15/2019 66     LDL Calculated (mg/dL)   Date Value   07/29/2013 82     AST (U/L)   Date Value   04/15/2019 17     ALT (U/L)   Date Value   04/15/2019 16     BUN (mg/dL)   Date Value   04/15/2019 27 (H)      (goal A1C is < 7)   (goal LDL is <100) need 30-50% reduction from baseline     BP Readings from Last 3 Encounters:   04/19/19 121/76   01/16/19 132/76   11/05/18 120/74    (goal /80)      All Future Testing planned in [facility]:  Lab Frequency Next Occurrence   XR THORACIC SPINE (MIN 4 VIEWS) Once 06/13/2018   XR HIP RIGHT (2-3 VIEWS) Once 01/16/2019   Comprehensive Metabolic Panel Once 64/64/2549   Hemoglobin A1C Once 07/17/2019   Microalbumin, Ur Once 07/17/2019   CBC Once 07/17/2019   Ferritin Once 07/17/2019   Iron and TIBC Once 07/17/2019   T4, Free Once 07/17/2019   Thyroid Antibodies Once 07/17/2019   Vitamin B12 Once 04/23/2019       Next Visit Date:  Future Appointments Date Time Provider Eva Dent   6/20/2019  1:00 PM Jo Calvillo MD [facility]   8/7/2019  2:40 PM Jese Lorenzana MD [facility]            Patient Active Problem List:     Restless leg syndrome     KRISHNA on CPAP     Diabetes mellitus type 2 with complications ([address]     Hyperlipidemia with target LDL less than 100     Asthma     Allergic rhinitis     Essential hypertension     Major depression     Anxiety     Spondylisthesis     S/P lumbar fusion     Gastroesophageal reflux disease     Diabetic nephropathy associated with type 2 diabetes mellitus (HCC)     Moderate single current episode of major depressive disorder ([address]     Irritable bowel syndrome with diarrhea     Morbid obesity with BMI of 40.0-44.9, adult ([address]     Chronic kidney disease, stage III (moderate) ([address]

## 2019-07-17 DIAGNOSIS — E78.5 HYPERLIPIDEMIA, UNSPECIFIED HYPERLIPIDEMIA TYPE: ICD-10-CM

## 2019-07-17 RX ORDER — ATORVASTATIN CALCIUM 80 MG/1
TABLET, FILM COATED ORAL
Qty: 90 TABLET | Refills: 1 | Status: SHIPPED | OUTPATIENT
Start: 2019-07-17 | End: 2020-03-02

## 2019-07-17 NOTE — TELEPHONE ENCOUNTER
Last visit: 4/19/19  Last Med refill: 5/17/19  Does patient have enough medication for 72 hours: No: out of meds    Next Visit Date:  Future Appointments   Date Time Provider Eva Jailene   7/25/2019  1:00 PM Alley Schafer MD [facility]   8/7/2019  2:40 PM [facility] [address] Maintenance   Topic Date Due    DTaP/Tdap/Td vaccine (1 - Tdap) 02/03/1967    Annual Wellness Visit (AWV)  02/03/2011    Diabetic retinal exam  08/08/2017    Breast cancer screen  02/12/2018    Diabetic foot exam  06/29/2019    Shingles Vaccine (1 of 2) 04/19/2020 (Originally 2/3/1998)    Flu vaccine (1) 09/01/2019    A1C test (Diabetic or Prediabetic)  09/10/2019    Lipid screen  04/15/2020    Potassium monitoring  04/15/2020    Creatinine monitoring  04/15/2020    Colon cancer screen colonoscopy  01/23/2024    Pneumococcal 65+ years Vaccine  Completed    Hepatitis C screen  Completed    DEXA (modify frequency per FRAX score)  Addressed       Hemoglobin A1C (%)   Date Value   09/10/2018 7.1 (H)   06/29/2018 6.7   02/15/2018 6.7             ( goal A1C is < 7)   Microalb/Crt.  Ratio (mcg/mg creat)   Date Value   09/10/2018 13   09/10/2018 13     LDL Cholesterol (mg/dL)   Date Value   04/15/2019 66   09/10/2018 74     LDL Calculated (mg/dL)   Date Value   07/29/2013 82       (goal LDL is <100)   AST (U/L)   Date Value   04/15/2019 17     ALT (U/L)   Date Value   04/15/2019 16     BUN (mg/dL)   Date Value   04/15/2019 27 (H)     BP Readings from Last 3 Encounters:   04/19/19 121/76   01/16/19 132/76   11/05/18 120/74          (goal 120/80)    All Future Testing planned in [facility]  Lab Frequency Next Occurrence   XR HIP RIGHT (2-3 VIEWS) Once 01/16/2019   Comprehensive Metabolic Panel Once 13/88/7207   Hemoglobin A1C Once 07/17/2019   Microalbumin, Ur Once 07/17/2019   CBC Once 07/17/2019   Ferritin Once 07/17/2019   Iron and TIBC Once 07/17/2019   T4, Free Once 07/17/2019

## 2019-07-25 ENCOUNTER — OFFICE VISIT (OUTPATIENT)
Dept: ORTHOPEDIC SURGERY | Age: 69
End: 2019-07-25
Payer: MEDICARE

## 2019-07-25 VITALS — BODY MASS INDEX: 40.27 KG/M2 | WEIGHT: 235.89 LBS | HEIGHT: 64 IN

## 2019-07-25 DIAGNOSIS — M75.41 IMPINGEMENT SYNDROME OF RIGHT SHOULDER REGION: Primary | ICD-10-CM

## 2019-07-25 PROCEDURE — 1123F ACP DISCUSS/DSCN MKR DOCD: CPT | Performed by: ORTHOPAEDIC SURGERY

## 2019-07-25 PROCEDURE — 1090F PRES/ABSN URINE INCON ASSESS: CPT | Performed by: ORTHOPAEDIC SURGERY

## 2019-07-25 PROCEDURE — G8417 CALC BMI ABV UP PARAM F/U: HCPCS | Performed by: ORTHOPAEDIC SURGERY

## 2019-07-25 PROCEDURE — G8427 DOCREV CUR MEDS BY ELIG CLIN: HCPCS | Performed by: ORTHOPAEDIC SURGERY

## 2019-07-25 PROCEDURE — 3017F COLORECTAL CA SCREEN DOC REV: CPT | Performed by: ORTHOPAEDIC SURGERY

## 2019-07-25 PROCEDURE — 4040F PNEUMOC VAC/ADMIN/RCVD: CPT | Performed by: ORTHOPAEDIC SURGERY

## 2019-07-25 PROCEDURE — 99213 OFFICE O/P EST LOW 20 MIN: CPT | Performed by: ORTHOPAEDIC SURGERY

## 2019-07-25 PROCEDURE — G8399 PT W/DXA RESULTS DOCUMENT: HCPCS | Performed by: ORTHOPAEDIC SURGERY

## 2019-07-25 PROCEDURE — 1036F TOBACCO NON-USER: CPT | Performed by: ORTHOPAEDIC SURGERY

## 2019-08-05 ENCOUNTER — HOSPITAL ENCOUNTER (OUTPATIENT)
Age: 69
Setting detail: SPECIMEN
Discharge: HOME | End: 2019-08-05
Payer: MEDICARE

## 2019-08-05 DIAGNOSIS — I10 HYPERTENSION, UNSPECIFIED TYPE: ICD-10-CM

## 2019-08-05 DIAGNOSIS — E61.1 LOW SERUM IRON: ICD-10-CM

## 2019-08-05 DIAGNOSIS — E78.5 HYPERLIPIDEMIA WITH TARGET LDL LESS THAN 100: ICD-10-CM

## 2019-08-05 DIAGNOSIS — E03.8 TSH (THYROID-STIMULATING HORMONE DEFICIENCY): ICD-10-CM

## 2019-08-05 DIAGNOSIS — R79.89 ELEVATED TSH: ICD-10-CM

## 2019-08-05 DIAGNOSIS — Z79.4 TYPE 2 DIABETES MELLITUS WITH COMPLICATION, WITH LONG-TERM CURRENT USE OF INSULIN (HCC): ICD-10-CM

## 2019-08-05 DIAGNOSIS — E11.8 TYPE 2 DIABETES MELLITUS WITH COMPLICATION, WITH LONG-TERM CURRENT USE OF INSULIN (HCC): ICD-10-CM

## 2019-08-05 LAB
ALBUMIN SERPL-MCNC: 3.9 G/DL (ref 3.5–5.2)
ALBUMIN/GLOBULIN RATIO: 1.5 (ref 1–2.5)
ALP BLD-CCNC: 169 U/L (ref 35–104)
ALT SERPL-CCNC: 17 U/L (ref 5–33)
ANION GAP SERPL CALCULATED.3IONS-SCNC: 13 MMOL/L (ref 9–17)
AST SERPL-CCNC: 14 U/L
BILIRUB SERPL-MCNC: 0.23 MG/DL (ref 0.3–1.2)
BUN BLDV-MCNC: 23 MG/DL (ref 8–23)
BUN/CREAT BLD: ABNORMAL (ref 9–20)
CALCIUM SERPL-MCNC: 8.8 MG/DL (ref 8.6–10.4)
CHLORIDE BLD-SCNC: 106 MMOL/L (ref 98–107)
CO2: 22 MMOL/L (ref 20–31)
CREAT SERPL-MCNC: 1.47 MG/DL (ref 0.5–0.9)
ESTIMATED AVERAGE GLUCOSE: 171 MG/DL
FERRITIN: 37 UG/L (ref 13–150)
GFR AFRICAN AMERICAN: 42 ML/MIN
GFR NON-AFRICAN AMERICAN: 35 ML/MIN
GFR SERPL CREATININE-BSD FRML MDRD: ABNORMAL ML/MIN/{1.73_M2}
GFR SERPL CREATININE-BSD FRML MDRD: ABNORMAL ML/MIN/{1.73_M2}
GLUCOSE BLD-MCNC: 185 MG/DL (ref 70–99)
HBA1C MFR BLD: 7.6 % (ref 4–6)
HCT VFR BLD CALC: 33.9 % (ref 36.3–47.1)
HEMOGLOBIN: 10.6 G/DL (ref 11.9–15.1)
IRON SATURATION: 22 % (ref 20–55)
IRON: 70 UG/DL (ref 37–145)
MCH RBC QN AUTO: 28.6 PG (ref 25.2–33.5)
MCHC RBC AUTO-ENTMCNC: 31.3 G/DL (ref 28.4–34.8)
MCV RBC AUTO: 91.6 FL (ref 82.6–102.9)
NRBC AUTOMATED: 0 PER 100 WBC
PDW BLD-RTO: 13.8 % (ref 11.8–14.4)
PLATELET # BLD: 223 K/UL (ref 138–453)
PMV BLD AUTO: 10.3 FL (ref 8.1–13.5)
POTASSIUM SERPL-SCNC: 4.7 MMOL/L (ref 3.7–5.3)
RBC # BLD: 3.7 M/UL (ref 3.95–5.11)
SODIUM BLD-SCNC: 141 MMOL/L (ref 135–144)
THYROXINE, FREE: 0.86 NG/DL (ref 0.93–1.7)
TOTAL IRON BINDING CAPACITY: 316 UG/DL (ref 250–450)
TOTAL PROTEIN: 6.5 G/DL (ref 6.4–8.3)
UNSATURATED IRON BINDING CAPACITY: 246 UG/DL (ref 112–347)
WBC # BLD: 7.4 K/UL (ref 3.5–11.3)

## 2019-08-06 LAB
THYROGLOBULIN AB: <20 IU/ML (ref 0–40)
THYROID PEROXIDASE (TPO) AB: <10 IU/ML (ref 0–35)

## 2019-08-07 ENCOUNTER — OFFICE VISIT (OUTPATIENT)
Dept: FAMILY MEDICINE CLINIC | Age: 69
End: 2019-08-07
Payer: MEDICARE

## 2019-08-07 VITALS
WEIGHT: 244 LBS | RESPIRATION RATE: 18 BRPM | DIASTOLIC BLOOD PRESSURE: 70 MMHG | BODY MASS INDEX: 42.54 KG/M2 | SYSTOLIC BLOOD PRESSURE: 120 MMHG | TEMPERATURE: 97.7 F

## 2019-08-07 DIAGNOSIS — J30.1 CHRONIC SEASONAL ALLERGIC RHINITIS DUE TO POLLEN: ICD-10-CM

## 2019-08-07 DIAGNOSIS — G47.33 OSA (OBSTRUCTIVE SLEEP APNEA): ICD-10-CM

## 2019-08-07 DIAGNOSIS — M54.6 THORACIC SPINE PAIN: ICD-10-CM

## 2019-08-07 DIAGNOSIS — M25.552 BILATERAL HIP PAIN: ICD-10-CM

## 2019-08-07 DIAGNOSIS — G89.29 CHRONIC PAIN OF LEFT KNEE: ICD-10-CM

## 2019-08-07 DIAGNOSIS — F32.1 MODERATE SINGLE CURRENT EPISODE OF MAJOR DEPRESSIVE DISORDER (HCC): ICD-10-CM

## 2019-08-07 DIAGNOSIS — Z98.1 S/P LUMBAR FUSION: ICD-10-CM

## 2019-08-07 DIAGNOSIS — R79.89 ABNORMAL THYROID BLOOD TEST: ICD-10-CM

## 2019-08-07 DIAGNOSIS — I10 ESSENTIAL HYPERTENSION: Primary | ICD-10-CM

## 2019-08-07 DIAGNOSIS — F41.9 ANXIETY: ICD-10-CM

## 2019-08-07 DIAGNOSIS — E78.5 HYPERLIPIDEMIA WITH TARGET LDL LESS THAN 100: ICD-10-CM

## 2019-08-07 DIAGNOSIS — M43.10 SPONDYLOLISTHESIS, UNSPECIFIED SPINAL REGION: ICD-10-CM

## 2019-08-07 DIAGNOSIS — M25.562 CHRONIC PAIN OF LEFT KNEE: ICD-10-CM

## 2019-08-07 DIAGNOSIS — E11.8 TYPE 2 DIABETES MELLITUS WITH COMPLICATION, WITH LONG-TERM CURRENT USE OF INSULIN (HCC): ICD-10-CM

## 2019-08-07 DIAGNOSIS — M25.551 BILATERAL HIP PAIN: ICD-10-CM

## 2019-08-07 DIAGNOSIS — K21.9 GASTROESOPHAGEAL REFLUX DISEASE, ESOPHAGITIS PRESENCE NOT SPECIFIED: ICD-10-CM

## 2019-08-07 DIAGNOSIS — E53.8 B12 DEFICIENCY: ICD-10-CM

## 2019-08-07 DIAGNOSIS — Z79.4 TYPE 2 DIABETES MELLITUS WITH COMPLICATION, WITH LONG-TERM CURRENT USE OF INSULIN (HCC): ICD-10-CM

## 2019-08-07 DIAGNOSIS — J45.20 MILD INTERMITTENT ASTHMA WITHOUT COMPLICATION: ICD-10-CM

## 2019-08-07 DIAGNOSIS — G47.31 CENTRAL SLEEP APNEA: ICD-10-CM

## 2019-08-07 DIAGNOSIS — G25.81 RESTLESS LEG SYNDROME: ICD-10-CM

## 2019-08-07 DIAGNOSIS — R22.1 SENSATION OF LUMP IN THROAT: ICD-10-CM

## 2019-08-07 PROCEDURE — 99214 OFFICE O/P EST MOD 30 MIN: CPT | Performed by: PEDIATRICS

## 2019-08-07 PROCEDURE — 4040F PNEUMOC VAC/ADMIN/RCVD: CPT | Performed by: PEDIATRICS

## 2019-08-07 PROCEDURE — 1036F TOBACCO NON-USER: CPT | Performed by: PEDIATRICS

## 2019-08-07 PROCEDURE — 1090F PRES/ABSN URINE INCON ASSESS: CPT | Performed by: PEDIATRICS

## 2019-08-07 PROCEDURE — G8399 PT W/DXA RESULTS DOCUMENT: HCPCS | Performed by: PEDIATRICS

## 2019-08-07 PROCEDURE — G8417 CALC BMI ABV UP PARAM F/U: HCPCS | Performed by: PEDIATRICS

## 2019-08-07 PROCEDURE — 0518F FALL PLAN OF CARE DOCD: CPT | Performed by: PEDIATRICS

## 2019-08-07 PROCEDURE — 2022F DILAT RTA XM EVC RTNOPTHY: CPT | Performed by: PEDIATRICS

## 2019-08-07 PROCEDURE — 3288F FALL RISK ASSESSMENT DOCD: CPT | Performed by: PEDIATRICS

## 2019-08-07 PROCEDURE — G8427 DOCREV CUR MEDS BY ELIG CLIN: HCPCS | Performed by: PEDIATRICS

## 2019-08-07 PROCEDURE — 1123F ACP DISCUSS/DSCN MKR DOCD: CPT | Performed by: PEDIATRICS

## 2019-08-07 PROCEDURE — 3017F COLORECTAL CA SCREEN DOC REV: CPT | Performed by: PEDIATRICS

## 2019-08-07 PROCEDURE — 3045F PR MOST RECENT HEMOGLOBIN A1C LEVEL 7.0-9.0%: CPT | Performed by: PEDIATRICS

## 2019-08-07 RX ORDER — OXYCODONE HYDROCHLORIDE AND ACETAMINOPHEN 5; 325 MG/1; MG/1
1 TABLET ORAL EVERY 6 HOURS PRN
Qty: 28 TABLET | Refills: 0 | Status: CANCELLED | OUTPATIENT
Start: 2019-08-07 | End: 2019-08-14

## 2019-08-07 ASSESSMENT — ENCOUNTER SYMPTOMS
WATER BRASH: 0
BELCHING: 0
HEARTBURN: 0
TROUBLE SWALLOWING: 0
SINUS PRESSURE: 0
EYE REDNESS: 0
BLOOD IN STOOL: 0
ABDOMINAL PAIN: 0
BACK PAIN: 1
BLURRED VISION: 0
DIARRHEA: 1
COLOR CHANGE: 0
RHINORRHEA: 0
SHORTNESS OF BREATH: 1
SORE THROAT: 0
HOARSE VOICE: 0
CONSTIPATION: 0
GLOBUS SENSATION: 0
CHOKING: 0
EYE PAIN: 0
ORTHOPNEA: 0
COUGH: 1
WHEEZING: 0
EYE DISCHARGE: 0
VOMITING: 0
STRIDOR: 0
CHEST TIGHTNESS: 0
NAUSEA: 0

## 2019-08-07 NOTE — PROGRESS NOTES
Subjective:      Patient ID: Olinda Dunbar is a 68 y.o. female. Visit Information    Have you changed or started any medications since your last visit including any over-the-counter medicines, vitamins, or herbal medicines? no   Are you having any side effects from any of your medications? -  no  Have you stopped taking any of your medications? Is so, why? -  no    Have you seen any other physician or provider since your last visit? No  Have you had any other diagnostic tests since your last visit? No  Have you been seen in the emergency room and/or had an admission to a hospital since we last saw you? No  Have you had your routine dental cleaning in the past 6 months? no    Have you activated your [employer] account? If not, what are your barriers?  Yes     Patient Care Team:  Harsh Kelly MD as PCP - General (Internal Medicine)  Harsh Kelly MD as PCP [facility]  Makayla Templeton MD as Consulting Physician (Nephrology)  Franki Singh MD as Consulting Physician (Pulmonology)  MD Mau Moore MD as Consulting Physician (Endocrinology)  Laurent Jackson MD as Consulting Physician (Gastroenterology)    Medical History Review  Past Medical, Family, and Social History reviewed and does contribute to the patient presenting condition    Health Maintenance   Topic Date Due    DTaP/Tdap/Td vaccine (1 - Tdap) 02/03/1967    Diabetic retinal exam  08/08/2017    Diabetic foot exam  06/29/2019    Shingles Vaccine (2 of 3) 04/19/2020 (Originally 9/28/2012)    Breast cancer screen  08/07/2020 (Originally 2/12/2018)    Annual Wellness Visit (AWV)  08/07/2021 (Originally 2/3/2011)    Flu vaccine (1) 09/01/2019    Lipid screen  04/15/2020    A1C test (Diabetic or Prediabetic)  08/05/2020    Potassium monitoring  08/05/2020    Creatinine monitoring  08/05/2020    Colon cancer screen colonoscopy  01/23/2024    Pneumococcal 65+ years Vaccine  Completed    Hepatitis C screen  Completed    DEXA (modify frequency per FRAX score)  Addressed       PHQ Scores 8/4/2017   PHQ2 Score 2   PHQ9 Score 2     Interpretation of Total Score DepressionSeverity: 1-4 = Minimal depression, 5-9 = Mild depression, 10-14 = Moderate depression, 15-19 = Moderately severe depression, 20-27 = Severe depression    Current Outpatient Medications   Medication Sig Dispense Refill    atorvastatin (LIPITOR) 80 MG tablet TAKE 1 TABLET EVERY DAY 90 tablet 1    gabapentin (NEURONTIN) 300 MG capsule Take 1 capsule by mouth 3 times daily. 270 capsule 1    citalopram (CELEXA) 40 MG tablet Take 1 tablet by mouth daily 90 tablet 1    tiZANidine (ZANAFLEX) 2 MG tablet TAKE 1 TABLET EVERY 8 HOURS AS NEEDED FOR MUSCLE SPASM(S) 270 tablet 1    losartan (COZAAR) 100 MG tablet Take 1 tablet by mouth daily 90 tablet 1    traZODone (DESYREL) 100 MG tablet Take 1 tablet by mouth nightly Take 1 to 2 Tablets every night 180 tablet 1    metFORMIN (GLUCOPHAGE) 500 MG tablet Take 500 mg by mouth 2 times daily (with meals)      BD INSULIN SYRINGE ULTRAFINE 31G X 15/64\" 0.5 ML MISC       NOVOLIN 70/30 (70-30) 100 UNIT/ML injection vial Inject into the skin 2 times daily (with meals) 25 in the AM 22 at night      ACCU-CHEK TARIQ PLUS strip       aspirin 81 MG tablet Take 1 tablet by mouth nightly 30 tablet 3    glimepiride (AMARYL) 4 MG tablet Take 1 tablet by mouth every morning (before breakfast)       B-D ULTRAFINE III SHORT PEN 31G X 8 MM MISC       omeprazole (PRILOSEC) 20 MG capsule Take 20 mg by mouth nightly Indications: GERD-Related Laryngitis       albuterol (PROVENTIL) (2.5 MG/3ML) 0.083% nebulizer solution Take 2.5 mg by nebulization every 4 hours as needed for Wheezing.       Multiple Vitamins-Minerals (MULTI COMPLETE PO) Take 1 tablet by mouth nightly       Probiotic Product (Sensorly PO) Take 1 tablet by mouth nightly       LORazepam (ATIVAN) 0.5 MG tablet TAKE 1 TABLET BY MOUTH TWICE DAILY AS NEEDED FOR ANXIETY 60 tablet 2    ranitidine (ZANTAC) 150 MG tablet Take 1 tablet by mouth nightly 90 tablet 1     No current facility-administered medications for this visit. HPI:      Patient presents today for routine follow up of her chronic medical problems including hypertension, hyperlipidemia, asthma, allergies, KRISHNA, central sleep apnea, restless leg syndrome, GERD, depression, anxiety, spondylolisthesis s/p lumbar fusion. Her blood pressure is well controlled on losartan. She is taking and tolerating lipitor for cholesterol without side effects. Her asthma and allergies are well controlled with singulair. Her KRISHNA and central sleep apnea is treated now with a servovent and she is feeling much better. Marina Tolbert is following with her pulmonologist, Dr. Nancy Garcia.    Her GERD is controlled with prilosec and zantac. She states her depression and anxiety is fairly well controlled with celexa daily and ativan as needed. She is somewhat stressed with her significant other's illnesses. Marina Tolbert is quite stressed because she is afraid to leave the house. Her restless leg is stable with neurontin. She also underwent a posterior lumbar interbody fusion of L4/L5 due to spondylolisthesis in 2016. She does have low back pain intermittently. She is currently taking Percocet only as needed and Neurontin and tizanidine twice daily. This combination of medications is controlling her back pain fairly well. She tells me that her back does continue to bother her. Her diabetes type 2 with nephropathy is followed by her endocrinologist, Dr. Rick Lezama. Marina Tolbert takes insulin 70/30 - 25 units in the am and 22 units at night (if her sugar is under 115 at night she will only take 12 units).  Her eye exam is due. Liya Light last HgA1c was 7.6. She does complain of bilateral shoulder pain worse on the right still because she has not been able to do therapy.  She did see Dr. Guanako Quarles and had an injection which was not patient is nervous/anxious (stable). Objective:     /70 (Site: Right Upper Arm, Position: Sitting, Cuff Size: Medium Adult)   Temp 97.7 °F (36.5 °C) (Tympanic)   Resp 18   Wt 244 lb (110.7 kg)   Breastfeeding? No   BMI 42.54 kg/m²        Physical Exam   Constitutional: She is oriented to person, place, and time. She appears well-developed and well-nourished. No distress. obese   HENT:   Head: Normocephalic. Right Ear: External ear normal.   Left Ear: External ear normal.   Nose: Nose normal.   Mouth/Throat: Oropharynx is clear and moist. No oropharyngeal exudate. Eyes: Pupils are equal, round, and reactive to light. Conjunctivae and EOM are normal. Right eye exhibits no discharge. Left eye exhibits no discharge. No scleral icterus. Neck: Normal range of motion. Neck supple. No JVD present. No thyromegaly present. Cardiovascular: Normal rate, regular rhythm and normal heart sounds. No murmur heard. Pulmonary/Chest: Effort normal and breath sounds normal. No stridor. She has no wheezes. She has no rales. Abdominal: Soft. Bowel sounds are normal. She exhibits no mass. There is no tenderness. Musculoskeletal: She exhibits no edema. Right shoulder: She exhibits decreased range of motion, tenderness and decreased strength. Left shoulder: She exhibits decreased range of motion and tenderness. Right hip: She exhibits tenderness. She exhibits normal strength. Left hip: She exhibits tenderness. She exhibits normal strength. Right knee: Normal.        Left knee: She exhibits decreased range of motion. Tenderness found. Medial joint line tenderness noted. Back:         Legs:  Lymphadenopathy:     She has no cervical adenopathy. Neurological: She is alert and oriented to person, place, and time. She displays normal reflexes. No cranial nerve deficit. She exhibits normal muscle tone. Coordination normal.   Skin: Skin is warm.  Capillary refill takes

## 2019-08-08 LAB — TSH SERPL DL<=0.05 MIU/L-ACNC: 3.42 MIU/L (ref 0.3–5)

## 2019-08-09 DIAGNOSIS — F41.9 ANXIETY: ICD-10-CM

## 2019-08-09 RX ORDER — LORAZEPAM 0.5 MG/1
TABLET ORAL
Qty: 60 TABLET | Refills: 2 | Status: SHIPPED | OUTPATIENT
Start: 2019-08-09 | End: 2019-11-07

## 2019-08-09 NOTE — TELEPHONE ENCOUNTER
SPINE (3 VIEWS) Once 08/07/2019   XR KNEE LEFT (3 VIEWS) Once 08/07/2019   TSH without Reflex Once 08/07/2019               Patient Active Problem List:     Restless leg syndrome     KRISHNA on CPAP     Diabetes mellitus type 2 with complications (HCC)     Hyperlipidemia with target LDL less than 100     Asthma     Allergic rhinitis     Essential hypertension     Major depression     Anxiety     Spondylisthesis     S/P lumbar fusion     Gastroesophageal reflux disease     Diabetic nephropathy associated with type 2 diabetes mellitus (HCC)     Moderate single current episode of major depressive disorder ([address]     Irritable bowel syndrome with diarrhea     Morbid obesity with BMI of 40.0-44.9, adult ([address]     Chronic kidney disease, stage III (moderate) ([address]

## 2019-08-20 DIAGNOSIS — E61.1 LOW SERUM IRON: ICD-10-CM

## 2019-08-20 DIAGNOSIS — E11.8 TYPE 2 DIABETES MELLITUS WITH COMPLICATION, WITH LONG-TERM CURRENT USE OF INSULIN (HCC): ICD-10-CM

## 2019-08-20 DIAGNOSIS — E03.8 TSH (THYROID-STIMULATING HORMONE DEFICIENCY): ICD-10-CM

## 2019-08-20 DIAGNOSIS — I10 ESSENTIAL HYPERTENSION: Primary | ICD-10-CM

## 2019-08-20 DIAGNOSIS — Z79.4 TYPE 2 DIABETES MELLITUS WITH COMPLICATION, WITH LONG-TERM CURRENT USE OF INSULIN (HCC): ICD-10-CM

## 2019-08-20 DIAGNOSIS — E78.5 HYPERLIPIDEMIA WITH TARGET LDL LESS THAN 100: ICD-10-CM

## 2019-11-13 ENCOUNTER — OFFICE VISIT (OUTPATIENT)
Dept: FAMILY MEDICINE CLINIC | Age: 69
End: 2019-11-13
Payer: MEDICARE

## 2019-11-13 VITALS
TEMPERATURE: 97.3 F | WEIGHT: 236.5 LBS | DIASTOLIC BLOOD PRESSURE: 74 MMHG | RESPIRATION RATE: 18 BRPM | BODY MASS INDEX: 41.23 KG/M2 | HEART RATE: 76 BPM | SYSTOLIC BLOOD PRESSURE: 122 MMHG

## 2019-11-13 DIAGNOSIS — J45.20 MILD INTERMITTENT ASTHMA WITHOUT COMPLICATION: ICD-10-CM

## 2019-11-13 DIAGNOSIS — G47.33 OSA (OBSTRUCTIVE SLEEP APNEA): ICD-10-CM

## 2019-11-13 DIAGNOSIS — Z12.31 ENCOUNTER FOR SCREENING MAMMOGRAM FOR MALIGNANT NEOPLASM OF BREAST: ICD-10-CM

## 2019-11-13 DIAGNOSIS — F32.1 MODERATE SINGLE CURRENT EPISODE OF MAJOR DEPRESSIVE DISORDER (HCC): ICD-10-CM

## 2019-11-13 DIAGNOSIS — B37.2 YEAST DERMATITIS: ICD-10-CM

## 2019-11-13 DIAGNOSIS — R79.89 ABNORMAL THYROID BLOOD TEST: ICD-10-CM

## 2019-11-13 DIAGNOSIS — Z79.4 TYPE 2 DIABETES MELLITUS WITH COMPLICATION, WITH LONG-TERM CURRENT USE OF INSULIN (HCC): ICD-10-CM

## 2019-11-13 DIAGNOSIS — I10 ESSENTIAL HYPERTENSION: Primary | ICD-10-CM

## 2019-11-13 DIAGNOSIS — Z98.1 S/P LUMBAR FUSION: ICD-10-CM

## 2019-11-13 DIAGNOSIS — M25.551 BILATERAL HIP PAIN: ICD-10-CM

## 2019-11-13 DIAGNOSIS — E53.8 B12 DEFICIENCY: ICD-10-CM

## 2019-11-13 DIAGNOSIS — K21.9 GASTROESOPHAGEAL REFLUX DISEASE, ESOPHAGITIS PRESENCE NOT SPECIFIED: ICD-10-CM

## 2019-11-13 DIAGNOSIS — E11.8 TYPE 2 DIABETES MELLITUS WITH COMPLICATION, WITH LONG-TERM CURRENT USE OF INSULIN (HCC): ICD-10-CM

## 2019-11-13 DIAGNOSIS — E78.5 HYPERLIPIDEMIA WITH TARGET LDL LESS THAN 100: ICD-10-CM

## 2019-11-13 DIAGNOSIS — M43.10 SPONDYLOLISTHESIS, UNSPECIFIED SPINAL REGION: ICD-10-CM

## 2019-11-13 DIAGNOSIS — J30.1 CHRONIC SEASONAL ALLERGIC RHINITIS DUE TO POLLEN: ICD-10-CM

## 2019-11-13 DIAGNOSIS — G89.29 CHRONIC PAIN OF LEFT KNEE: ICD-10-CM

## 2019-11-13 DIAGNOSIS — M25.562 CHRONIC PAIN OF LEFT KNEE: ICD-10-CM

## 2019-11-13 DIAGNOSIS — F41.9 ANXIETY: ICD-10-CM

## 2019-11-13 DIAGNOSIS — M54.6 THORACIC SPINE PAIN: ICD-10-CM

## 2019-11-13 DIAGNOSIS — G47.31 CENTRAL SLEEP APNEA: ICD-10-CM

## 2019-11-13 DIAGNOSIS — G25.81 RESTLESS LEG SYNDROME: ICD-10-CM

## 2019-11-13 DIAGNOSIS — M25.552 BILATERAL HIP PAIN: ICD-10-CM

## 2019-11-13 PROCEDURE — 1090F PRES/ABSN URINE INCON ASSESS: CPT | Performed by: PEDIATRICS

## 2019-11-13 PROCEDURE — 1123F ACP DISCUSS/DSCN MKR DOCD: CPT | Performed by: PEDIATRICS

## 2019-11-13 PROCEDURE — G8417 CALC BMI ABV UP PARAM F/U: HCPCS | Performed by: PEDIATRICS

## 2019-11-13 PROCEDURE — 3288F FALL RISK ASSESSMENT DOCD: CPT | Performed by: PEDIATRICS

## 2019-11-13 PROCEDURE — G8427 DOCREV CUR MEDS BY ELIG CLIN: HCPCS | Performed by: PEDIATRICS

## 2019-11-13 PROCEDURE — 4040F PNEUMOC VAC/ADMIN/RCVD: CPT | Performed by: PEDIATRICS

## 2019-11-13 PROCEDURE — 99214 OFFICE O/P EST MOD 30 MIN: CPT | Performed by: PEDIATRICS

## 2019-11-13 PROCEDURE — 2022F DILAT RTA XM EVC RTNOPTHY: CPT | Performed by: PEDIATRICS

## 2019-11-13 PROCEDURE — 3051F HG A1C>EQUAL 7.0%<8.0%: CPT | Performed by: PEDIATRICS

## 2019-11-13 PROCEDURE — G8484 FLU IMMUNIZE NO ADMIN: HCPCS | Performed by: PEDIATRICS

## 2019-11-13 PROCEDURE — 0518F FALL PLAN OF CARE DOCD: CPT | Performed by: PEDIATRICS

## 2019-11-13 PROCEDURE — 1036F TOBACCO NON-USER: CPT | Performed by: PEDIATRICS

## 2019-11-13 PROCEDURE — 3017F COLORECTAL CA SCREEN DOC REV: CPT | Performed by: PEDIATRICS

## 2019-11-13 PROCEDURE — G8399 PT W/DXA RESULTS DOCUMENT: HCPCS | Performed by: PEDIATRICS

## 2019-11-13 RX ORDER — ALBUTEROL SULFATE 2.5 MG/3ML
2.5 SOLUTION RESPIRATORY (INHALATION) EVERY 6 HOURS PRN
Qty: 150 EACH | Refills: 3 | Status: SHIPPED | OUTPATIENT
Start: 2019-11-13 | End: 2020-02-14 | Stop reason: SDUPTHER

## 2019-11-13 RX ORDER — NYSTATIN 100000 U/G
OINTMENT TOPICAL
Qty: 90 G | Refills: 2 | Status: SHIPPED | OUTPATIENT
Start: 2019-11-13 | End: 2020-12-30 | Stop reason: SDUPTHER

## 2019-11-13 RX ORDER — CITALOPRAM 40 MG/1
40 TABLET ORAL DAILY
Qty: 90 TABLET | Refills: 1 | Status: SHIPPED | OUTPATIENT
Start: 2019-11-13 | End: 2020-06-02

## 2019-11-13 RX ORDER — NYSTATIN 100000 [USP'U]/G
POWDER TOPICAL
Qty: 180 G | Refills: 2 | Status: SHIPPED | OUTPATIENT
Start: 2019-11-13 | End: 2020-06-18

## 2019-11-13 ASSESSMENT — ENCOUNTER SYMPTOMS
CHEST TIGHTNESS: 0
SINUS PRESSURE: 0
TROUBLE SWALLOWING: 0
COLOR CHANGE: 0
CONSTIPATION: 0
ABDOMINAL PAIN: 0
STRIDOR: 0
ORTHOPNEA: 0
CHOKING: 0
BACK PAIN: 1
EYE DISCHARGE: 0
EYE PAIN: 0
BLURRED VISION: 0
VOMITING: 0
EYE REDNESS: 0
SHORTNESS OF BREATH: 0
COUGH: 1
BLOOD IN STOOL: 0
DIARRHEA: 1
WHEEZING: 0
SORE THROAT: 0
NAUSEA: 0
RHINORRHEA: 0

## 2019-11-28 DIAGNOSIS — I10 HYPERTENSION, UNSPECIFIED TYPE: ICD-10-CM

## 2019-11-29 RX ORDER — LOSARTAN POTASSIUM 100 MG/1
100 TABLET ORAL DAILY
Qty: 90 TABLET | Refills: 1 | Status: SHIPPED | OUTPATIENT
Start: 2019-11-29 | End: 2020-04-20

## 2019-12-02 ENCOUNTER — HOSPITAL ENCOUNTER (OUTPATIENT)
Dept: ULTRASOUND IMAGING | Facility: CLINIC | Age: 69
Discharge: HOME | End: 2019-12-04
Payer: MEDICARE

## 2019-12-02 ENCOUNTER — HOSPITAL ENCOUNTER (OUTPATIENT)
Dept: MAMMOGRAPHY | Facility: CLINIC | Age: 69
Discharge: HOME | End: 2019-12-04
Payer: MEDICARE

## 2019-12-02 ENCOUNTER — HOSPITAL ENCOUNTER (OUTPATIENT)
Dept: GENERAL RADIOLOGY | Facility: CLINIC | Age: 69
Discharge: HOME | End: 2019-12-04
Payer: MEDICARE

## 2019-12-02 DIAGNOSIS — M25.562 CHRONIC PAIN OF LEFT KNEE: ICD-10-CM

## 2019-12-02 DIAGNOSIS — M54.6 THORACIC SPINE PAIN: ICD-10-CM

## 2019-12-02 DIAGNOSIS — M25.551 RIGHT HIP PAIN: ICD-10-CM

## 2019-12-02 DIAGNOSIS — Z12.31 ENCOUNTER FOR SCREENING MAMMOGRAM FOR MALIGNANT NEOPLASM OF BREAST: ICD-10-CM

## 2019-12-02 DIAGNOSIS — G89.29 CHRONIC PAIN OF LEFT KNEE: ICD-10-CM

## 2019-12-02 DIAGNOSIS — R22.1 SENSATION OF LUMP IN THROAT: ICD-10-CM

## 2019-12-02 DIAGNOSIS — R79.89 ABNORMAL THYROID BLOOD TEST: ICD-10-CM

## 2019-12-02 PROCEDURE — 73502 X-RAY EXAM HIP UNI 2-3 VIEWS: CPT

## 2019-12-02 PROCEDURE — 77067 SCR MAMMO BI INCL CAD: CPT

## 2019-12-02 PROCEDURE — 73562 X-RAY EXAM OF KNEE 3: CPT

## 2019-12-02 PROCEDURE — 76536 US EXAM OF HEAD AND NECK: CPT

## 2019-12-02 PROCEDURE — 72072 X-RAY EXAM THORAC SPINE 3VWS: CPT

## 2019-12-05 DIAGNOSIS — K21.9 GASTROESOPHAGEAL REFLUX DISEASE, ESOPHAGITIS PRESENCE NOT SPECIFIED: ICD-10-CM

## 2019-12-09 RX ORDER — RANITIDINE 150 MG/1
150 TABLET ORAL NIGHTLY
Qty: 90 TABLET | Refills: 1 | Status: SHIPPED | OUTPATIENT
Start: 2019-12-09 | End: 2020-05-15 | Stop reason: ALTCHOICE

## 2020-01-10 RX ORDER — TIZANIDINE 2 MG/1
2 TABLET ORAL EVERY 8 HOURS PRN
Qty: 270 TABLET | Refills: 1 | Status: SHIPPED | OUTPATIENT
Start: 2020-01-10 | End: 2020-05-18

## 2020-01-10 NOTE — TELEPHONE ENCOUNTER
IWM:[birth year]  Quaker:[birth year]  LRF:3-14-19  Health Maintenance   Topic Date Due    DTaP/Tdap/Td vaccine (1 - Tdap) 02/03/1959    Diabetic retinal exam  08/08/2017    Diabetic foot exam  06/29/2019    Shingles Vaccine (2 of 3) 04/19/2020 (Originally 9/28/2012)    Annual Wellness Visit (AWV)  08/07/2021 (Originally 5/29/2019)    Lipid screen  04/15/2020    A1C test (Diabetic or Prediabetic)  08/05/2020    Potassium monitoring  08/05/2020    Creatinine monitoring  08/05/2020    Breast cancer screen  12/02/2021    Colon cancer screen colonoscopy  01/23/2024    Flu vaccine  Completed    Pneumococcal 65+ years Vaccine  Completed    Hepatitis C screen  Completed    DEXA (modify frequency per FRAX score)  Addressed             (applicable per patient's age: Cancer Screenings, Depression Screening, Fall Risk Screening, Immunizations)    Hemoglobin A1C (%)   Date Value   08/05/2019 7.6 (H)   09/10/2018 7.1 (H)   06/29/2018 6.7     Microalb/Crt.  Ratio (mcg/mg creat)   Date Value   09/10/2018 13   09/10/2018 13     LDL Cholesterol (mg/dL)   Date Value   04/15/2019 66     LDL Calculated (mg/dL)   Date Value   07/29/2013 82     AST (U/L)   Date Value   08/05/2019 14     ALT (U/L)   Date Value   08/05/2019 17     BUN (mg/dL)   Date Value   08/05/2019 23      (goal A1C is < 7)   (goal LDL is <100) need 30-50% reduction from baseline     BP Readings from Last 3 Encounters:   11/13/19 122/74   08/07/19 120/70   04/19/19 121/76    (goal /80)      All Future Testing planned in [facility]:  Lab Frequency Next Occurrence   TSH without Reflex Once 08/07/2019   Lipid Panel Once 11/20/2019   CBC Once 11/20/2019   Comprehensive Metabolic Panel Once 84/77/4196   Iron And TIBC Once 11/20/2019   Hemoglobin A1C Once 11/20/2019   Microalbumin, Ur Once 11/20/2019   T4, Free Once 11/20/2019       Next Visit Date:  Future Appointments   Date Time Provider Eva Dent   2/14/2020  1:40 PM MD Efe Foster [facility] [place] [facility]            Patient Active Problem List:     Restless leg syndrome     KRISHNA on CPAP     Diabetes mellitus type 2 with complications ([address]     Hyperlipidemia with target LDL less than 100     Asthma     Allergic rhinitis     Essential hypertension     Major depression     Anxiety     Spondylisthesis     S/P lumbar fusion     Gastroesophageal reflux disease     Diabetic nephropathy associated with type 2 diabetes mellitus (HCC)     Moderate single current episode of major depressive disorder ([address]     Irritable bowel syndrome with diarrhea     Morbid obesity with BMI of 40.0-44.9, adult ([address]     Chronic kidney disease, stage III (moderate) ([address]

## 2020-01-31 ENCOUNTER — HOSPITAL ENCOUNTER (OUTPATIENT)
Age: 70
Setting detail: SPECIMEN
Discharge: HOME | End: 2020-01-31
Payer: MEDICARE

## 2020-01-31 LAB
ALBUMIN SERPL-MCNC: 3.8 G/DL (ref 3.5–5.2)
ALBUMIN/GLOBULIN RATIO: 1.7 (ref 1–2.5)
ALP BLD-CCNC: 157 U/L (ref 35–104)
ALT SERPL-CCNC: 14 U/L (ref 5–33)
ANION GAP SERPL CALCULATED.3IONS-SCNC: 14 MMOL/L (ref 9–17)
AST SERPL-CCNC: 16 U/L
BILIRUB SERPL-MCNC: 0.25 MG/DL (ref 0.3–1.2)
BUN BLDV-MCNC: 18 MG/DL (ref 8–23)
BUN/CREAT BLD: ABNORMAL (ref 9–20)
CALCIUM SERPL-MCNC: 9 MG/DL (ref 8.6–10.4)
CHLORIDE BLD-SCNC: 103 MMOL/L (ref 98–107)
CHOLESTEROL/HDL RATIO: 3.6
CHOLESTEROL: 128 MG/DL
CO2: 22 MMOL/L (ref 20–31)
CREAT SERPL-MCNC: 1.3 MG/DL (ref 0.5–0.9)
GFR AFRICAN AMERICAN: 49 ML/MIN
GFR NON-AFRICAN AMERICAN: 40 ML/MIN
GFR SERPL CREATININE-BSD FRML MDRD: ABNORMAL ML/MIN/{1.73_M2}
GFR SERPL CREATININE-BSD FRML MDRD: ABNORMAL ML/MIN/{1.73_M2}
GLUCOSE BLD-MCNC: 90 MG/DL (ref 70–99)
HCT VFR BLD CALC: 37.2 % (ref 36.3–47.1)
HDLC SERPL-MCNC: 36 MG/DL
HEMOGLOBIN: 11.4 G/DL (ref 11.9–15.1)
IRON SATURATION: 17 % (ref 20–55)
IRON: 51 UG/DL (ref 37–145)
LDL CHOLESTEROL: 69 MG/DL (ref 0–130)
MCH RBC QN AUTO: 27.9 PG (ref 25.2–33.5)
MCHC RBC AUTO-ENTMCNC: 30.6 G/DL (ref 28.4–34.8)
MCV RBC AUTO: 91 FL (ref 82.6–102.9)
NRBC AUTOMATED: 0 PER 100 WBC
PDW BLD-RTO: 14.2 % (ref 11.8–14.4)
PLATELET # BLD: 232 K/UL (ref 138–453)
PMV BLD AUTO: 10.5 FL (ref 8.1–13.5)
POTASSIUM SERPL-SCNC: 4.7 MMOL/L (ref 3.7–5.3)
RBC # BLD: 4.09 M/UL (ref 3.95–5.11)
SODIUM BLD-SCNC: 139 MMOL/L (ref 135–144)
THYROXINE, FREE: 0.9 NG/DL (ref 0.93–1.7)
TOTAL IRON BINDING CAPACITY: 306 UG/DL (ref 250–450)
TOTAL PROTEIN: 6.1 G/DL (ref 6.4–8.3)
TRIGL SERPL-MCNC: 117 MG/DL
TSH SERPL DL<=0.05 MIU/L-ACNC: 5.28 MIU/L (ref 0.3–5)
UNSATURATED IRON BINDING CAPACITY: 255 UG/DL (ref 112–347)
VLDLC SERPL CALC-MCNC: ABNORMAL MG/DL (ref 1–30)
WBC # BLD: 7.3 K/UL (ref 3.5–11.3)

## 2020-02-02 LAB
ESTIMATED AVERAGE GLUCOSE: 160 MG/DL
HBA1C MFR BLD: 7.2 % (ref 4–6)

## 2020-02-14 ENCOUNTER — OFFICE VISIT (OUTPATIENT)
Dept: FAMILY MEDICINE CLINIC | Age: 70
End: 2020-02-14
Payer: MEDICARE

## 2020-02-14 VITALS
DIASTOLIC BLOOD PRESSURE: 76 MMHG | SYSTOLIC BLOOD PRESSURE: 120 MMHG | RESPIRATION RATE: 20 BRPM | BODY MASS INDEX: 40.97 KG/M2 | TEMPERATURE: 98 F | HEART RATE: 82 BPM | WEIGHT: 235 LBS

## 2020-02-14 PROBLEM — G47.31 CENTRAL SLEEP APNEA: Status: ACTIVE | Noted: 2020-02-14

## 2020-02-14 PROBLEM — E53.8 B12 DEFICIENCY: Status: ACTIVE | Noted: 2020-02-14

## 2020-02-14 PROBLEM — E11.42 DIABETIC POLYNEUROPATHY ASSOCIATED WITH TYPE 2 DIABETES MELLITUS (HCC): Status: ACTIVE | Noted: 2020-02-14

## 2020-02-14 PROCEDURE — 1090F PRES/ABSN URINE INCON ASSESS: CPT | Performed by: PEDIATRICS

## 2020-02-14 PROCEDURE — G8399 PT W/DXA RESULTS DOCUMENT: HCPCS | Performed by: PEDIATRICS

## 2020-02-14 PROCEDURE — 4040F PNEUMOC VAC/ADMIN/RCVD: CPT | Performed by: PEDIATRICS

## 2020-02-14 PROCEDURE — 99214 OFFICE O/P EST MOD 30 MIN: CPT | Performed by: PEDIATRICS

## 2020-02-14 PROCEDURE — 3051F HG A1C>EQUAL 7.0%<8.0%: CPT | Performed by: PEDIATRICS

## 2020-02-14 PROCEDURE — G8417 CALC BMI ABV UP PARAM F/U: HCPCS | Performed by: PEDIATRICS

## 2020-02-14 PROCEDURE — 3017F COLORECTAL CA SCREEN DOC REV: CPT | Performed by: PEDIATRICS

## 2020-02-14 PROCEDURE — 1036F TOBACCO NON-USER: CPT | Performed by: PEDIATRICS

## 2020-02-14 PROCEDURE — 1123F ACP DISCUSS/DSCN MKR DOCD: CPT | Performed by: PEDIATRICS

## 2020-02-14 PROCEDURE — 2022F DILAT RTA XM EVC RTNOPTHY: CPT | Performed by: PEDIATRICS

## 2020-02-14 PROCEDURE — G8427 DOCREV CUR MEDS BY ELIG CLIN: HCPCS | Performed by: PEDIATRICS

## 2020-02-14 PROCEDURE — G8484 FLU IMMUNIZE NO ADMIN: HCPCS | Performed by: PEDIATRICS

## 2020-02-14 ASSESSMENT — ENCOUNTER SYMPTOMS
STRIDOR: 0
CONSTIPATION: 0
GLOBUS SENSATION: 0
HOARSE VOICE: 0
BLURRED VISION: 0
SORE THROAT: 0
BELCHING: 0
WATER BRASH: 0
BACK PAIN: 1
DIARRHEA: 1
COLOR CHANGE: 0
SINUS PRESSURE: 0
EYE PAIN: 0
CHEST TIGHTNESS: 0
ORTHOPNEA: 0
BLOOD IN STOOL: 0
EYE REDNESS: 0
TROUBLE SWALLOWING: 0
WHEEZING: 0
CHOKING: 0
ABDOMINAL PAIN: 0
HEARTBURN: 0
RHINORRHEA: 0
VOMITING: 0
COUGH: 0
EYE DISCHARGE: 0
SHORTNESS OF BREATH: 0
NAUSEA: 0

## 2020-02-14 NOTE — PROGRESS NOTES
Subjective:      Patient ID: Jojo Brunson is a 65 y.o. female. Visit Information    Have you changed or started any medications since your last visit including any over-the-counter medicines, vitamins, or herbal medicines? no   Are you having any side effects from any of your medications? -  no  Have you stopped taking any of your medications? Is so, why? -  no    Have you seen any other physician or provider since your last visit? No  Have you had any other diagnostic tests since your last visit? No  Have you been seen in the emergency room and/or had an admission to a hospital since we last saw you? No  Have you activated your [employer] account? If not, what are your barriers?  Yes     Patient Care Team:  Evelyn Méndez MD as PCP - General (Internal Medicine)  Evelyn Méndez MD as PCP - [facility] Provider  Carley Blackman MD as Consulting Physician (Nephrology)  Sonia Das MD as Consulting Physician (Pulmonology)  MD Holli Yuen MD as Consulting Physician (Endocrinology)  Amado Moreno MD as Consulting Physician (Gastroenterology)    Medical History Review  Past Medical, Family, and Social History reviewed and does contribute to the patient presenting condition    Health Maintenance   Topic Date Due    Diabetic retinal exam  08/08/2017    Diabetic foot exam  06/29/2019    Shingles Vaccine (2 of 3) 04/19/2020 (Originally 9/28/2012)    Hepatitis B vaccine (1 of 3 - Risk 3-dose series) 02/14/2021 (Originally 2/3/1969)    DTaP/Tdap/Td vaccine (1 - Tdap) 02/14/2021 (Originally [birth year]3/1959)    Annual Wellness Visit (AWV)  08/07/2021 (Originally 5/29/2019)    A1C test (Diabetic or Prediabetic)  01/31/2021    Lipid screen  01/31/2021    Potassium monitoring  01/31/2021    Creatinine monitoring  01/31/2021    Breast cancer screen  12/02/2021    Colon cancer screen colonoscopy  01/23/2024    Flu vaccine  Completed    Pneumococcal 65+ years Vaccine Completed    Hepatitis C screen  Completed    DEXA (modify frequency per FRAX score)  Addressed    Hepatitis A vaccine  Aged Out    Hib vaccine  Aged Out    Meningococcal (ACWY) vaccine  Aged Out       PHQ Scores 8/4/2017   PHQ2 Score 2   PHQ9 Score 2     Interpretation of Total Score DepressionSeverity: 1-4 = Minimal depression, 5-9 = Mild depression, 10-14 = Moderate depression, 15-19 = Moderately severe depression, 20-27 = Severe depression    Current Outpatient Medications   Medication Sig Dispense Refill    tiZANidine (ZANAFLEX) 2 MG tablet Take 1 tablet by mouth every 8 hours as needed (muscle spasms) 270 tablet 1    ranitidine (ZANTAC) 150 MG tablet Take 1 tablet by mouth nightly 90 tablet 1    losartan (COZAAR) 100 MG tablet Take 1 tablet by mouth daily 90 tablet 1    citalopram (CELEXA) 40 MG tablet Take 1 tablet by mouth daily 90 tablet 1    nystatin (MYCOSTATIN) 591757 UNIT/GM powder Apply 3 times daily. 180 g 2    nystatin (MYCOSTATIN) 799325 UNIT/GM ointment Apply topically 2 times daily. 90 g 2    atorvastatin (LIPITOR) 80 MG tablet TAKE 1 TABLET EVERY DAY 90 tablet 1    gabapentin (NEURONTIN) 300 MG capsule Take 1 capsule by mouth 3 times daily.  270 capsule 1    metFORMIN (GLUCOPHAGE) 500 MG tablet Take 500 mg by mouth 2 times daily (with meals)      BD INSULIN SYRINGE ULTRAFINE 31G X 15/64\" 0.5 ML MISC       NOVOLIN 70/30 (70-30) 100 UNIT/ML injection vial Inject into the skin 2 times daily (with meals) 25 in the AM 22 at night      ACCU-CHEK TARIQ PLUS strip       aspirin 81 MG tablet Take 1 tablet by mouth nightly 30 tablet 3    glimepiride (AMARYL) 4 MG tablet Take 1 tablet by mouth every morning (before breakfast)       B-D ULTRAFINE III SHORT PEN 31G X 8 MM MISC       omeprazole (PRILOSEC) 20 MG capsule Take 20 mg by mouth nightly Indications: GERD-Related Laryngitis       albuterol (PROVENTIL) (2.5 MG/3ML) 0.083% nebulizer solution Take 2.5 mg by nebulization every 4 hours as needed for Wheezing.  Multiple Vitamins-Minerals (MULTI COMPLETE PO) Take 1 tablet by mouth nightly       Probiotic Product ([address] PO) Take 1 tablet by mouth nightly       oxyCODONE-acetaminophen (PERCOCET) 5-325 MG per tablet Take 2 tablets by mouth every 4 hours as needed (spondylisis) for up to 30 days. 30 tablet 0    traZODone (DESYREL) 100 MG tablet Take 1 tablet by mouth nightly Take 1 to 2 Tablets every night 180 tablet 1     No current facility-administered medications for this visit. HPI:      Patient presents today for routine follow up of her chronic medical problems including hypertension, hyperlipidemia, asthma, allergies, KRISHNA, central sleep apnea, restless leg syndrome, GERD, depression, anxiety, spondylolisthesis s/p lumbar fusion. Her blood pressure is well controlled on losartan. She is taking and tolerating lipitor for cholesterol without side effects. Her asthma and allergies are well controlled with singulair. Her KRISHNA and central sleep apnea is treated now with a servovent and she is feeling much better. Christopher Renteria is following with her pulmonologist, Dr. Beatris Guadalupe. Christopher Renteria did have a new sleep study in 2019. Her GERD is controlled with prilosec and zantac. She states her depression and anxiety is fairly well controlled with celexa daily and ativan as needed. She reports that she is doing mch better with respect to her mood. She states that she has not used her Ativan in a long time. Her restless leg is stable with neurontin. She also underwent a posterior lumbar interbody fusion of L4/L5 due to spondylolisthesis in 2016. She does have low back pain intermittently. She is currently taking Percocet only as needed and Neurontin and tizanidine twice daily. She has only taken 1 percocet since her significant other's death 5 months ago. Her pain is not as bad because she is not lifting her.    Her diabetes type 2 with nephropathy, CKD and neuropathy is followed cmw       Depression: Patient complains of depression. She complains of depressed mood. Onset was approximately several years ago, gradually improving since that time. She denies current suicidal and homicidal plan or intent. Family history significant for no psychiatric illness. Possible organic causes contributing are: none. Risk factor  Previous treatment includes Celexa and none. She complains of the following side effects from the treatment: none. Diabetes   She presents for her follow-up diabetic visit. She has type 2 diabetes mellitus. No MedicAlert identification noted. Hypoglycemia symptoms include nervousness/anxiousness (stable). Pertinent negatives for hypoglycemia include no confusion, dizziness, headaches, sweats or tremors. There are no diabetic associated symptoms. Pertinent negatives for diabetes include no blurred vision, no chest pain, no fatigue, no polydipsia, no polyphagia, no polyuria, no weakness and no weight loss. There are no hypoglycemic complications. Risk factors for coronary artery disease include diabetes mellitus, hypertension, obesity and sedentary lifestyle. Current diabetic treatment includes diet, insulin injections and oral agent (dual therapy). She is compliant with treatment most of the time. Her weight is stable. She is following a diabetic diet. When asked about meal planning, she reported none. She has not had a previous visit with a dietitian. She rarely participates in exercise. Her home blood glucose trend is fluctuating minimally. Eye exam is current. Hypertension   This is a chronic problem. The current episode started more than 1 year ago. The problem has been gradually improving since onset. Pertinent negatives include no anxiety, blurred vision, chest pain, headaches, malaise/fatigue, neck pain, orthopnea, palpitations, peripheral edema, PND, shortness of breath or sweats.  Risk factors for coronary artery disease include diabetes mellitus, obesity and sedentary lifestyle. The current treatment provides mild improvement. Compliance problems include exercise. Hyperlipidemia   This is a chronic problem. The current episode started more than 1 year ago. Recent lipid tests were reviewed and are variable. Exacerbating diseases include diabetes and obesity. Pertinent negatives include no chest pain, focal sensory loss, focal weakness, leg pain, myalgias or shortness of breath. Current antihyperlipidemic treatment includes statins. The current treatment provides mild improvement of lipids. Compliance problems include adherence to exercise. Risk factors for coronary artery disease include diabetes mellitus, hypertension, obesity and a sedentary lifestyle. Gastroesophageal Reflux   She reports no abdominal pain, no belching, no chest pain, no choking, no coughing, no dysphagia, no early satiety, no globus sensation, no heartburn, no hoarse voice, no nausea, no sore throat, no stridor, no tooth decay, no water brash or no wheezing. This is a chronic problem. The current episode started more than 1 year ago. The problem occurs occasionally. The problem has been waxing and waning. Pertinent negatives include no anemia, fatigue, melena, muscle weakness, orthopnea or weight loss. Risk factors include obesity and lack of exercise. She has tried an antacid for the symptoms. The treatment provided mild relief. Review of Systems   Constitutional: Negative for activity change, appetite change, fatigue, fever, malaise/fatigue, unexpected weight change and weight loss. HENT: Negative for congestion, ear pain, hoarse voice, postnasal drip, rhinorrhea, sinus pressure, sneezing, sore throat, tinnitus and trouble swallowing. Globus sensation often   Eyes: Negative for blurred vision, pain, discharge, redness and visual disturbance. Respiratory: Negative for cough, choking, chest tightness, shortness of breath, wheezing and stridor.          Asthma well controlled Refill: Capillary refill takes less than 2 seconds. Findings: No rash. Neurological:      General: No focal deficit present. Mental Status: She is alert and oriented to person, place, and time. Cranial Nerves: No cranial nerve deficit. Motor: No abnormal muscle tone. Coordination: Coordination normal.      Deep Tendon Reflexes: Reflexes normal.   Psychiatric:         Attention and Perception: Attention normal.         Mood and Affect: Mood is not anxious or depressed. Speech: Speech normal.         Behavior: Behavior normal.         Thought Content: Thought content normal.         Judgment: Judgment normal.         Assessment:      Diagnosis Orders   1. Essential hypertension     2. Hyperlipidemia with target LDL less than 100     3. Mild intermittent asthma without complication     4. Chronic seasonal allergic rhinitis due to pollen     5. KRISHNA (obstructive sleep apnea)     6. Central sleep apnea     7. Restless leg syndrome     8. Gastroesophageal reflux disease, esophagitis presence not specified     9. Moderate single current episode of major depressive disorder ([address]     10. Anxiety     11. Spondylolisthesis, unspecified spinal region     12. S/P lumbar fusion     13. Diabetes mellitus type 2 with complications ([address]     14. Chronic kidney disease, stage III (moderate) (HCC)     15. Diabetic polyneuropathy associated with type 2 diabetes mellitus ([address]     16. B12 deficiency  Vitamin B12   17. Abnormal thyroid blood test     18. History of anemia  Iron and TIBC   19.  Encounter for screening for other viral diseases   Hepatitis B Surface Antibody        Plan:     Review of recent labs    Obtain labs in 3 months as ordered  Recommend continue current medications  Recommend subspecialty follow up as scheduled  Daily exercise and healthy diet encouraged  Eye exam yearly / yearly foot exam  Continue [employer]  Continue celexa   Weight reduction encouraged  Follow up with pulmonary and endocrinology as scheduled  Recommend endocrinology evaluation for abnormal thyroid tests and thyroid u/s  Recommend Tdap and shingles vaccine   Hep B series recommended but patient reports she has had the hep B series - will obtain H[facility]  Call with concerns      Nicholas Matthews received counseling on the following healthy behaviors: nutrition, exercise and medication adherence  Reviewed prior labs and health maintenance  Continue current medications, diet and exercise. Discussed use, benefit, and side effects of prescribed medications. Barriers to medication compliance addressed. Patient given educational materials - see patient instructions  Was a self-tracking handout given in paper form or via [employer]? No    Requested Prescriptions      No prescriptions requested or ordered in this encounter       All patient questions answered. Patient voiced understanding. Quality Measures    Body mass index is 40.97 kg/m². Elevated. Weight control planned discussed Healthy diet and regular exercise. BP: 120/76. Blood pressure is normal. Treatment plan consists of No treatment change needed. Fall Risk 8/7/2019 6/13/2018 5/17/2017 11/2/2016 2/12/2016 10/1/2014 6/19/2014   2 or more falls in past year? no no no no no no no   Fall with injury in past year? no no yes no no yes no     The patient does not have a history of falls. I did , complete a risk assessment for falls.  A plan of care for falls No Treatment plan indicated    Lab Results   Component Value Date    LDLCALC 82 07/29/2013    LDLCHOLESTEROL 69 01/31/2020    (goal LDL reduction with dx if diabetes is 50% LDL reduction)    PHQ Scores 8/4/2017   PHQ2 Score 2   PHQ9 Score 2     Interpretation of Total Score Depression Severity: 1-4 = Minimal depression, 5-9 = Mild depression, 10-14 = Moderate depression, 15-19 = Moderately severe depression, 20-27 = Severe depression        Electronically signed by Claudia Tavarez MD on 2/14/2020 at 10:13 PM

## 2020-03-02 RX ORDER — ATORVASTATIN CALCIUM 80 MG/1
80 TABLET, FILM COATED ORAL DAILY
Qty: 90 TABLET | Refills: 1 | Status: SHIPPED | OUTPATIENT
Start: 2020-03-02 | End: 2020-07-15

## 2020-03-02 NOTE — TELEPHONE ENCOUNTER
Last OARRS Review-08/22/2019  Last Office Visit-02/14/2020  Return To Office-3 months   Next Appointment Date-05/15/2020  Last Refill Date-07/17/2019 90 tabs   Number of Refills Given- 1  Labs associated with Medication done-0      Health Maintenance   Topic Date Due    Diabetic retinal exam  08/08/2017    Shingles Vaccine (2 of 3) 04/19/2020 (Originally 9/28/2012)    Hepatitis B vaccine (1 of 3 - Risk 3-dose series) 02/14/2021 (Originally 2/3/1969)    DTaP/Tdap/Td vaccine (1 - Tdap) 02/14/2021 (Originally 2/3/1959)    Annual Wellness Visit (AWV)  08/07/2021 (Originally 5/29/2019)    Diabetic foot exam  11/25/2020    A1C test (Diabetic or Prediabetic)  01/31/2021    Lipid screen  01/31/2021    Potassium monitoring  01/31/2021    Creatinine monitoring  01/31/2021    Breast cancer screen  12/02/2021    Colon cancer screen colonoscopy  01/23/2024    Flu vaccine  Completed    Pneumococcal 65+ years Vaccine  Completed    Hepatitis C screen  Completed    DEXA (modify frequency per FRAX score)  Addressed    Hepatitis A vaccine  Aged Out    Hib vaccine  Aged Out    Meningococcal (ACWY) vaccine  Aged Out             (applicable per patient's age: Cancer Screenings, Depression Screening, Fall Risk Screening, Immunizations)    Hemoglobin A1C (%)   Date Value   01/31/2020 7.2 (H)   08/05/2019 7.6 (H)   09/10/2018 7.1 (H)     Microalb/Crt.  Ratio (mcg/mg creat)   Date Value   09/10/2018 13   09/10/2018 13     LDL Cholesterol (mg/dL)   Date Value   01/31/2020 69     LDL Calculated (mg/dL)   Date Value   07/29/2013 82     AST (U/L)   Date Value   01/31/2020 16     ALT (U/L)   Date Value   01/31/2020 14     BUN (mg/dL)   Date Value   01/31/2020 18      (goal A1C is < 7)   (goal LDL is <100) need 30-50% reduction from baseline     BP Readings from Last 3 Encounters:   02/14/20 120/76   11/13/19 122/74   08/07/19 120/70    (goal /80)      All Future Testing planned in [facility]:  Lab Frequency Next Occurrence

## 2020-04-20 RX ORDER — LOSARTAN POTASSIUM 100 MG/1
100 TABLET ORAL DAILY
Qty: 90 TABLET | Refills: 1 | Status: SHIPPED | OUTPATIENT
Start: 2020-04-20 | End: 2020-10-01

## 2020-04-20 NOTE — TELEPHONE ENCOUNTER
LOV 2/14/20  RTO 3 months; F/U scheduled  [facility] 11/29/19    Health Maintenance   Topic Date Due    Shingles Vaccine (2 of 3) 09/28/2012    Diabetic retinal exam  08/08/2017    DTaP/Tdap/Td vaccine (1 - Tdap) 02/14/2021 (Originally [birth year]3/1967)    Annual Wellness Visit (AWV)  08/07/2021 (Originally 5/29/2019)    Diabetic foot exam  11/25/2020    A1C test (Diabetic or Prediabetic)  01/31/2021    Lipid screen  01/31/2021    Potassium monitoring  01/31/2021    Creatinine monitoring  01/31/2021    Breast cancer screen  12/02/2021    Colon cancer screen colonoscopy  01/23/2024    Flu vaccine  Completed    Pneumococcal 65+ years Vaccine  Completed    Hepatitis C screen  Completed    DEXA (modify frequency per FRAX score)  Addressed    Hepatitis A vaccine  Aged Out    Hib vaccine  Aged Out    Meningococcal (ACWY) vaccine  Aged Out             (applicable per patient's age: Cancer Screenings, Depression Screening, Fall Risk Screening, Immunizations)    Hemoglobin A1C (%)   Date Value   01/31/2020 7.2 (H)   08/05/2019 7.6 (H)   09/10/2018 7.1 (H)     Microalb/Crt.  Ratio (mcg/mg creat)   Date Value   09/10/2018 13   09/10/2018 13     LDL Cholesterol (mg/dL)   Date Value   01/31/2020 69     LDL Calculated (mg/dL)   Date Value   07/29/2013 82     AST (U/L)   Date Value   01/31/2020 16     ALT (U/L)   Date Value   01/31/2020 14     BUN (mg/dL)   Date Value   01/31/2020 18      (goal A1C is < 7)   (goal LDL is <100) need 30-50% reduction from baseline     BP Readings from Last 3 Encounters:   02/14/20 120/76   11/13/19 122/74   08/07/19 120/70    (goal /80)      All Future Testing planned in [facility]:  Lab Frequency Next Occurrence   Microalbumin, Ur Once 11/20/2019   Comprehensive Metabolic Panel Once 75/20/2129   CBC Once 05/03/2020   Hemoglobin A1C Once 05/03/2020   Microalbumin, Ur Once 05/03/2020   TSH Once 05/03/2020   T4, Free Once 05/03/2020   Iron and TIBC Once 04/30/2020   Hepatitis B Surface Antibody

## 2020-05-15 ENCOUNTER — OFFICE VISIT (OUTPATIENT)
Dept: FAMILY MEDICINE CLINIC | Age: 70
End: 2020-05-15
Payer: MEDICARE

## 2020-05-15 VITALS
RESPIRATION RATE: 18 BRPM | TEMPERATURE: 98.4 F | HEART RATE: 70 BPM | SYSTOLIC BLOOD PRESSURE: 122 MMHG | BODY MASS INDEX: 41.01 KG/M2 | WEIGHT: 235.2 LBS | OXYGEN SATURATION: 97 % | DIASTOLIC BLOOD PRESSURE: 84 MMHG

## 2020-05-15 PROCEDURE — 3051F HG A1C>EQUAL 7.0%<8.0%: CPT | Performed by: PEDIATRICS

## 2020-05-15 PROCEDURE — 4040F PNEUMOC VAC/ADMIN/RCVD: CPT | Performed by: PEDIATRICS

## 2020-05-15 PROCEDURE — 3288F FALL RISK ASSESSMENT DOCD: CPT | Performed by: PEDIATRICS

## 2020-05-15 PROCEDURE — G8427 DOCREV CUR MEDS BY ELIG CLIN: HCPCS | Performed by: PEDIATRICS

## 2020-05-15 PROCEDURE — 1090F PRES/ABSN URINE INCON ASSESS: CPT | Performed by: PEDIATRICS

## 2020-05-15 PROCEDURE — 0518F FALL PLAN OF CARE DOCD: CPT | Performed by: PEDIATRICS

## 2020-05-15 PROCEDURE — 3017F COLORECTAL CA SCREEN DOC REV: CPT | Performed by: PEDIATRICS

## 2020-05-15 PROCEDURE — 1036F TOBACCO NON-USER: CPT | Performed by: PEDIATRICS

## 2020-05-15 PROCEDURE — 2022F DILAT RTA XM EVC RTNOPTHY: CPT | Performed by: PEDIATRICS

## 2020-05-15 PROCEDURE — G8399 PT W/DXA RESULTS DOCUMENT: HCPCS | Performed by: PEDIATRICS

## 2020-05-15 PROCEDURE — 1123F ACP DISCUSS/DSCN MKR DOCD: CPT | Performed by: PEDIATRICS

## 2020-05-15 PROCEDURE — G8417 CALC BMI ABV UP PARAM F/U: HCPCS | Performed by: PEDIATRICS

## 2020-05-15 PROCEDURE — 99214 OFFICE O/P EST MOD 30 MIN: CPT | Performed by: PEDIATRICS

## 2020-05-15 ASSESSMENT — ENCOUNTER SYMPTOMS
CHOKING: 0
EYE PAIN: 0
VOMITING: 0
EYE REDNESS: 0
BLOOD IN STOOL: 0
COUGH: 0
COLOR CHANGE: 0
NAUSEA: 0
CONSTIPATION: 0
WHEEZING: 0
DIARRHEA: 1
SHORTNESS OF BREATH: 0
ABDOMINAL PAIN: 0
SORE THROAT: 0
RHINORRHEA: 0
EYE DISCHARGE: 0
SINUS PRESSURE: 0
CHEST TIGHTNESS: 0
TROUBLE SWALLOWING: 0
BACK PAIN: 1
STRIDOR: 0

## 2020-05-15 NOTE — PROGRESS NOTES
Subjective:      Patient ID: Thais Hansen is a 65 y.o. female. Visit Information    Have you changed or started any medications since your last visit including any over-the-counter medicines, vitamins, or herbal medicines? no   Are you having any side effects from any of your medications? -  no  Have you stopped taking any of your medications? Is so, why? -  yes - pt choice    Have you seen any other physician or provider since your last visit? No  Have you had any other diagnostic tests since your last visit? No  Have you been seen in the emergency room and/or had an admission to a hospital since we last saw you? No  Have you had your routine dental cleaning in the past 6 months? no    Have you activated your [employer] account? If not, what are your barriers?  Yes     Patient Care Team:  Jaime Choi MD as PCP - General (Internal Medicine)  Jaime Choi MD as PCP - [facility] Provider  Deion Keith MD as Consulting Physician (Nephrology)  Editha Severe, MD as Consulting Physician (Pulmonology)  MD Lisa Ambrose MD as Consulting Physician (Endocrinology)  Jacklyn Haney MD as Consulting Physician (Gastroenterology)    Medical History Review  Past Medical, Family, and Social History reviewed and does contribute to the patient presenting condition    Health Maintenance   Topic Date Due    Shingles Vaccine (2 of 3) 09/28/2012    Diabetic retinal exam  08/08/2017    DTaP/Tdap/Td vaccine (1 - Tdap) 02/14/2021 (Originally 2/3/1969)    Annual Wellness Visit (AWV)  08/07/2021 (Originally 5/29/2019)    Diabetic foot exam  11/25/2020    A1C test (Diabetic or Prediabetic)  01/31/2021    Lipid screen  01/31/2021    Potassium monitoring  01/31/2021    Creatinine monitoring  01/31/2021    Breast cancer screen  12/02/2021    Colon cancer screen colonoscopy  01/23/2024    Flu vaccine  Completed    Pneumococcal 65+ years Vaccine  Completed    Hepatitis C screen  Completed    DEXA (modify frequency per FRAX score)  Addressed    Hepatitis A vaccine  Aged Out    Hib vaccine  Aged Out    Meningococcal (ACWY) vaccine  Aged Out       PHQ Scores 8/4/2017   PHQ2 Score 2   PHQ9 Score 2     Interpretation of Total Score DepressionSeverity: 1-4 = Minimal depression, 5-9 = Mild depression, 10-14 = Moderate depression, 15-19 = Moderately severe depression, 20-27 = Severe depression    Current Outpatient Medications   Medication Sig Dispense Refill    losartan (COZAAR) 100 MG tablet Take 1 tablet by mouth daily 90 tablet 1    atorvastatin (LIPITOR) 80 MG tablet Take 1 tablet by mouth daily 90 tablet 1    tiZANidine (ZANAFLEX) 2 MG tablet Take 1 tablet by mouth every 8 hours as needed (muscle spasms) 270 tablet 1    citalopram (CELEXA) 40 MG tablet Take 1 tablet by mouth daily 90 tablet 1    nystatin (MYCOSTATIN) 134603 UNIT/GM powder Apply 3 times daily. 180 g 2    nystatin (MYCOSTATIN) 916633 UNIT/GM ointment Apply topically 2 times daily. 90 g 2    gabapentin (NEURONTIN) 300 MG capsule Take 1 capsule by mouth 3 times daily.  270 capsule 1    traZODone (DESYREL) 100 MG tablet Take 1 tablet by mouth nightly Take 1 to 2 Tablets every night 180 tablet 1    metFORMIN (GLUCOPHAGE) 500 MG tablet Take 500 mg by mouth 2 times daily (with meals)      BD INSULIN SYRINGE ULTRAFINE 31G X 15/64\" 0.5 ML MISC       NOVOLIN 70/30 (70-30) 100 UNIT/ML injection vial Inject into the skin 2 times daily (with meals) 25 in the AM 22 at night      ACCU-CHEK TARIQ PLUS strip       aspirin 81 MG tablet Take 1 tablet by mouth nightly 30 tablet 3    glimepiride (AMARYL) 4 MG tablet Take 1 tablet by mouth every morning (before breakfast)       B-D ULTRAFINE III SHORT PEN 31G X 8 MM MISC       omeprazole (PRILOSEC) 20 MG capsule Take 20 mg by mouth nightly Indications: GERD-Related Laryngitis       albuterol (PROVENTIL) (2.5 MG/3ML) 0.083% nebulizer solution Take 2.5 mg by nebulization every 4 hours as needed for Wheezing.  oxyCODONE-acetaminophen (PERCOCET) 5-325 MG per tablet Take 2 tablets by mouth every 4 hours as needed (spondylisis) for up to 30 days. 30 tablet 0    Multiple Vitamins-Minerals (MULTI COMPLETE PO) Take 1 tablet by mouth nightly       Probiotic Product ([address]) Take 1 tablet by mouth nightly        No current facility-administered medications for this visit. HPI    Patient presents today for routine follow up of her chronic medical problems including hypertension, hyperlipidemia, asthma, allergies, KRISHNA, central sleep apnea, restless leg syndrome, GERD, depression, anxiety, spondylolisthesis s/p lumbar fusion. She also has a history of diabetes type 2 with chronic kidney disease and neuropathy. She has also been treated for B12 deficiency and she recently did have abnormal thyroid blood test for which she is willing to see her endocrinologist.  She also has a history of anemia. Her blood pressure is well controlled on losartan. She is taking and tolerating lipitor for cholesterol without side effects. Her asthma and allergies are well controlled with singulair. Her KRISHNA and central sleep apnea is treated now with a servovent and she is feeling much better. [facility] is following with her pulmonologist, Dr. Qi Valenzuela. [facility] did have a new sleep study in 2019.   Her GERD is controlled with prilosec. She has not been able to get ranitidine and I advised her that this is because it was taken off the market. I did tell her she can use some over-the-counter Pepcid to see if this is helpful if she needs it. She states her depression and anxiety is fairly well controlled with celexa daily and ativan as needed. She reports that she is doing much better with respect to her mood. She states that she has not used her Ativan in a long time. Her restless leg is stable with neurontin.    She also underwent a posterior lumbar interbody fusion of L4/L5 due other concerns at this time. cmw      Review of Systems   Constitutional: Negative for activity change, appetite change, fatigue, fever and unexpected weight change. HENT: Negative for congestion, ear pain, postnasal drip, rhinorrhea, sinus pressure, sneezing, sore throat, tinnitus and trouble swallowing. Globus sensation often   Eyes: Negative for pain, discharge, redness and visual disturbance. Respiratory: Negative for cough, choking, chest tightness, shortness of breath, wheezing and stridor. Asthma well controlled with singulair daily and albuterol as needed. KRISHNA / central sleep apnea controlled with Servovent nightly - she follows up with Dr. Deya Carcamo. Cardiovascular: Negative for chest pain, palpitations and leg swelling. Gastrointestinal: Positive for diarrhea (secondary to irritable bowel - improved with [employer] but she does have intermittent episodes once per week). Negative for abdominal pain, blood in stool, constipation, nausea and vomiting. GERD controlled with omeprazole   Endocrine: Negative for polydipsia, polyphagia and polyuria. History of abnormal thyroid labs    Genitourinary: Negative for dysuria, pelvic pain and urgency. Musculoskeletal: Positive for arthralgias (right shoulder pain chronic and bilateral hip pain / left knee pain) and back pain (Mid back pain occasionally). Negative for myalgias and neck pain. Skin: Negative for color change, rash and wound. Allergic/Immunologic: Negative for immunocompromised state. Neurological: Positive for numbness. Negative for dizziness, tremors, weakness, light-headedness and headaches. Hematological: Negative for adenopathy. Does not bruise/bleed easily. History of mild anemia   Psychiatric/Behavioral: Positive for dysphoric mood (much better) and sleep disturbance (stable with trazodone). Negative for agitation, confusion and decreased concentration.  The patient is nervous/anxious hip: She exhibits tenderness. She exhibits normal strength. Right knee: Normal.      Left knee: She exhibits decreased range of motion. Tenderness found. Medial joint line tenderness noted. Lumbar back: She exhibits decreased range of motion and tenderness. Back:       Right lower leg: No edema. Left lower leg: No edema. Lymphadenopathy:      Cervical: No cervical adenopathy. Skin:     General: Skin is warm. Capillary Refill: Capillary refill takes less than 2 seconds. Findings: No rash. Neurological:      General: No focal deficit present. Mental Status: She is alert and oriented to person, place, and time. Cranial Nerves: No cranial nerve deficit. Motor: No abnormal muscle tone. Coordination: Coordination normal.      Deep Tendon Reflexes: Reflexes normal.   Psychiatric:         Attention and Perception: Attention normal.         Mood and Affect: Mood is not anxious or depressed. Speech: Speech normal.         Behavior: Behavior normal.         Thought Content: Thought content normal.         Judgment: Judgment normal.         Assessment:      Diagnosis Orders   1. Essential hypertension     2. Hyperlipidemia, unspecified hyperlipidemia type     3. Mild intermittent asthma without complication     4. Chronic seasonal allergic rhinitis due to pollen     5. KRISHNA (obstructive sleep apnea)     6. Central sleep apnea     7. Restless leg syndrome     8. Gastroesophageal reflux disease, esophagitis presence not specified     9. Moderate single current episode of major depressive disorder ([address]     10. Anxiety     11. Spondylolisthesis, unspecified spinal region     12. S/P lumbar fusion     13. Diabetes mellitus type 2 with complications ([address]     14. Chronic kidney disease, stage III (moderate) ([facility])     15. Diabetic polyneuropathy associated with type 2 diabetes mellitus ([address]     16. B12 deficiency     17. Abnormal thyroid blood test     18.  History of depression, 10-14 = Moderate depression, 15-19 = Moderately severe depression, 20-27 = Severe depression        Electronically signed by Poli De Luna MD on 5/15/2020 at 2:30 PM

## 2020-05-18 RX ORDER — TIZANIDINE 2 MG/1
2 TABLET ORAL EVERY 8 HOURS PRN
Qty: 270 TABLET | Refills: 1 | Status: SHIPPED | OUTPATIENT
Start: 2020-05-18 | End: 2020-10-08

## 2020-06-02 RX ORDER — CITALOPRAM 40 MG/1
40 TABLET ORAL DAILY
Qty: 90 TABLET | Refills: 1 | Status: SHIPPED | OUTPATIENT
Start: 2020-06-02 | End: 2021-04-06 | Stop reason: ALTCHOICE

## 2020-06-18 RX ORDER — NYSTATIN 100000 [USP'U]/G
POWDER TOPICAL
Qty: 180 G | Refills: 2 | Status: SHIPPED | OUTPATIENT
Start: 2020-06-18 | End: 2021-01-21

## 2020-06-18 RX ORDER — GABAPENTIN 300 MG/1
300 CAPSULE ORAL 3 TIMES DAILY
Qty: 270 CAPSULE | Refills: 1 | Status: SHIPPED | OUTPATIENT
Start: 2020-06-18 | End: 2020-12-30 | Stop reason: SDUPTHER

## 2020-06-22 ENCOUNTER — OFFICE VISIT (OUTPATIENT)
Dept: FAMILY MEDICINE CLINIC | Age: 70
End: 2020-06-22
Payer: MEDICARE

## 2020-06-22 VITALS
WEIGHT: 233 LBS | BODY MASS INDEX: 41.29 KG/M2 | DIASTOLIC BLOOD PRESSURE: 82 MMHG | OXYGEN SATURATION: 98 % | HEART RATE: 72 BPM | HEIGHT: 63 IN | TEMPERATURE: 97.2 F | SYSTOLIC BLOOD PRESSURE: 116 MMHG

## 2020-06-22 PROCEDURE — 1123F ACP DISCUSS/DSCN MKR DOCD: CPT | Performed by: PEDIATRICS

## 2020-06-22 PROCEDURE — 4040F PNEUMOC VAC/ADMIN/RCVD: CPT | Performed by: PEDIATRICS

## 2020-06-22 PROCEDURE — G0438 PPPS, INITIAL VISIT: HCPCS | Performed by: PEDIATRICS

## 2020-06-22 PROCEDURE — 3017F COLORECTAL CA SCREEN DOC REV: CPT | Performed by: PEDIATRICS

## 2020-06-22 ASSESSMENT — PATIENT HEALTH QUESTIONNAIRE - PHQ9
SUM OF ALL RESPONSES TO PHQ QUESTIONS 1-9: 0
SUM OF ALL RESPONSES TO PHQ QUESTIONS 1-9: 0

## 2020-06-22 ASSESSMENT — LIFESTYLE VARIABLES: HOW OFTEN DO YOU HAVE A DRINK CONTAINING ALCOHOL: 0

## 2020-06-22 NOTE — PROGRESS NOTES
Medicare Annual Wellness Visit    Name: Sydni Dior Date: 2020   MRN: [id number] Sex: Female   Age: 65 y.o. Ethnicity: Non-/Non    : 1950 Race: Jewell Black is here for Medicare AWV    Screenings for behavioral, psychosocial and functional/safety risks, and cognitive dysfunction are all negative except as indicated below. These results, as well as other patient data from the [address] [facility] [address] form, are documented in Flowsheets linked to this Encounter. Allergies   Allergen Reactions    Demerol Hcl [Meperidine] Nausea And Vomiting    Victoza [Liraglutide] Other (See Comments)     Pancriatitis         Prior to Visit Medications    Medication Sig Taking? Authorizing Provider   gabapentin (NEURONTIN) 300 MG capsule Take 1 capsule by mouth 3 times daily. Yes Mahamed Zaldivar MD   nystatin (MYCOSTATIN) 439439 UNIT/GM powder Apply 3 times daily. Yes Mahamed Zaldivar MD   citalopram (CELEXA) 40 MG tablet Take 1 tablet by mouth daily Yes Mahamed Zaldivar MD   tiZANidine (ZANAFLEX) 2 MG tablet Take 1 tablet by mouth every 8 hours as needed (MUSCLE SPASMS) Yes Mahamed Zaldivar MD   losartan (COZAAR) 100 MG tablet Take 1 tablet by mouth daily Yes Mahamed Zaldivar MD   atorvastatin (LIPITOR) 80 MG tablet Take 1 tablet by mouth daily Yes Mahamed Zaldivar MD   nystatin (MYCOSTATIN) 123398 UNIT/GM ointment Apply topically 2 times daily.  Yes Mahamed Zaldivar MD   traZODone (DESYREL) 100 MG tablet Take 1 tablet by mouth nightly Take 1 to 2 Tablets every night Yes Mahamed Zaldivar MD   metFORMIN (GLUCOPHAGE) 500 MG tablet Take 500 mg by mouth 2 times daily (with meals) Yes Historical Provider, MD   BD INSULIN SYRINGE ULTRAFINE 31G X 15\" 0.5 ML MISC  Yes Historical Provider, MD   NOVOLIN 70/30 (70-30) 100 UNIT/ML injection vial Inject into the skin 2 times daily (with meals) 25 in the AM 22 at night Yes Historical Provider, MD   [facility]  Yes Historical Provider, MD   aspirin 81 MG tablet Take 1 tablet by mouth nightly Yes Hector Knight DO   glimepiride (AMARYL) 4 MG tablet Take 1 tablet by mouth every morning (before breakfast)  Yes Historical Provider, MD IZAGUIRRE ULTRAFINE III SHORT PEN 31G X 8 MM MISC  Yes Historical Provider, MD   omeprazole (PRILOSEC) 20 MG capsule Take 20 mg by mouth nightly Indications: GERD-Related Laryngitis  Yes Historical Provider, MD   Probiotic Product ([address]) Take 1 tablet by mouth nightly  Yes Historical Provider, MD   oxyCODONE-acetaminophen (PERCOCET) 5-325 MG per tablet Take 2 tablets by mouth every 4 hours as needed (spondylisis) for up to 30 days. Rosemary Meyer MD   albuterol (PROVENTIL) (2.5 MG/3ML) 0.083% nebulizer solution Take 2.5 mg by nebulization every 4 hours as needed for Wheezing.   Farhan Contreras MD   Multiple Vitamins-Minerals [facility] COMPLETE PO) Take 1 tablet by mouth nightly   Historical Provider, MD         Past Medical History:   Diagnosis Date    Allergic rhinitis 4/25/2014    Anxiety 6/1/2015    on Ativan PRN    Arthritis     Asthma     on Inhaler    Chronic back pain     lumbar spondylolisthesis, stenosis    Chronic kidney disease     Diabetic nephropathy associated with type 2 diabetes mellitus ([address] 2/7/2017    Diabetic polyneuropathy associated with type 2 diabetes mellitus ([address] 2/14/2020    Fibromyalgia     GERD (gastroesophageal reflux disease)     on Zantac & Omeprazole    Hearing loss     Hearing loss     LEFT, no hearing aid    History of anemia     Hyperlipidemia 2006    on Meds    Hypertension 2006    on Meds    Irritable bowel syndrome with diarrhea 3/7/2018    Major depression 6/1/2015    Moderate single current episode of major depressive disorder ([address] 11/4/2017    Obesity     KRISHNA on CPAP     pulmonologist= Dr. Simon Rosario Osteoarthritis     Restless leg syndrome

## 2020-06-23 ASSESSMENT — ENCOUNTER SYMPTOMS
COLOR CHANGE: 0
STRIDOR: 0
SHORTNESS OF BREATH: 0
EYE PAIN: 0
EYE REDNESS: 0
COUGH: 0
EYE DISCHARGE: 0
DIARRHEA: 0
WHEEZING: 0
CONSTIPATION: 0

## 2020-07-15 RX ORDER — ATORVASTATIN CALCIUM 80 MG/1
80 TABLET, FILM COATED ORAL DAILY
Qty: 90 TABLET | Refills: 1 | Status: SHIPPED | OUTPATIENT
Start: 2020-07-15 | End: 2020-12-07

## 2020-08-17 ENCOUNTER — HOSPITAL ENCOUNTER (OUTPATIENT)
Age: 70
Setting detail: SPECIMEN
Discharge: HOME | End: 2020-08-17
Payer: MEDICARE

## 2020-08-17 LAB
ALBUMIN SERPL-MCNC: 3.7 G/DL (ref 3.5–5.2)
ALBUMIN/GLOBULIN RATIO: 1.4 (ref 1–2.5)
ALP BLD-CCNC: 163 U/L (ref 35–104)
ALT SERPL-CCNC: 16 U/L (ref 5–33)
ANION GAP SERPL CALCULATED.3IONS-SCNC: 16 MMOL/L (ref 9–17)
AST SERPL-CCNC: 16 U/L
BILIRUB SERPL-MCNC: 0.23 MG/DL (ref 0.3–1.2)
BUN BLDV-MCNC: 22 MG/DL (ref 8–23)
BUN/CREAT BLD: ABNORMAL (ref 9–20)
CALCIUM SERPL-MCNC: 9.3 MG/DL (ref 8.6–10.4)
CHLORIDE BLD-SCNC: 106 MMOL/L (ref 98–107)
CO2: 19 MMOL/L (ref 20–31)
CREAT SERPL-MCNC: 1.48 MG/DL (ref 0.5–0.9)
GFR AFRICAN AMERICAN: 42 ML/MIN
GFR NON-AFRICAN AMERICAN: 35 ML/MIN
GFR SERPL CREATININE-BSD FRML MDRD: ABNORMAL ML/MIN/{1.73_M2}
GFR SERPL CREATININE-BSD FRML MDRD: ABNORMAL ML/MIN/{1.73_M2}
GLUCOSE BLD-MCNC: 171 MG/DL (ref 70–99)
HBV SURFACE AB TITR SER: <3.5 MIU/ML
HCT VFR BLD CALC: 35.3 % (ref 36.3–47.1)
HEMOGLOBIN: 10.8 G/DL (ref 11.9–15.1)
IRON SATURATION: 22 % (ref 20–55)
IRON: 71 UG/DL (ref 37–145)
MCH RBC QN AUTO: 27.2 PG (ref 25.2–33.5)
MCHC RBC AUTO-ENTMCNC: 30.6 G/DL (ref 28.4–34.8)
MCV RBC AUTO: 88.9 FL (ref 82.6–102.9)
NRBC AUTOMATED: 0 PER 100 WBC
PDW BLD-RTO: 14.4 % (ref 11.8–14.4)
PLATELET # BLD: 247 K/UL (ref 138–453)
PMV BLD AUTO: 10.3 FL (ref 8.1–13.5)
POTASSIUM SERPL-SCNC: 4.5 MMOL/L (ref 3.7–5.3)
RBC # BLD: 3.97 M/UL (ref 3.95–5.11)
SODIUM BLD-SCNC: 141 MMOL/L (ref 135–144)
THYROXINE, FREE: 0.79 NG/DL (ref 0.93–1.7)
TOTAL IRON BINDING CAPACITY: 330 UG/DL (ref 250–450)
TOTAL PROTEIN: 6.3 G/DL (ref 6.4–8.3)
TSH SERPL DL<=0.05 MIU/L-ACNC: 3.91 MIU/L (ref 0.3–5)
UNSATURATED IRON BINDING CAPACITY: 259 UG/DL (ref 112–347)
VITAMIN B-12: 259 PG/ML (ref 232–1245)
WBC # BLD: 7.9 K/UL (ref 3.5–11.3)

## 2020-08-18 ENCOUNTER — HOSPITAL ENCOUNTER (OUTPATIENT)
Age: 70
Setting detail: SPECIMEN
Discharge: HOME | End: 2020-08-18
Payer: MEDICARE

## 2020-08-18 LAB
CREATININE URINE: 69.8 MG/DL (ref 28–217)
ESTIMATED AVERAGE GLUCOSE: 166 MG/DL
HBA1C MFR BLD: 7.4 % (ref 4–6)
MICROALBUMIN/CREAT 24H UR: <12 MG/L
MICROALBUMIN/CREAT UR-RTO: NORMAL MCG/MG CREAT

## 2020-08-19 ENCOUNTER — OFFICE VISIT (OUTPATIENT)
Dept: FAMILY MEDICINE CLINIC | Age: 70
End: 2020-08-19
Payer: MEDICARE

## 2020-08-19 VITALS
WEIGHT: 233 LBS | DIASTOLIC BLOOD PRESSURE: 84 MMHG | HEART RATE: 66 BPM | TEMPERATURE: 95.9 F | SYSTOLIC BLOOD PRESSURE: 124 MMHG | BODY MASS INDEX: 41.27 KG/M2

## 2020-08-19 PROCEDURE — G8417 CALC BMI ABV UP PARAM F/U: HCPCS | Performed by: PEDIATRICS

## 2020-08-19 PROCEDURE — 1090F PRES/ABSN URINE INCON ASSESS: CPT | Performed by: PEDIATRICS

## 2020-08-19 PROCEDURE — 99214 OFFICE O/P EST MOD 30 MIN: CPT | Performed by: PEDIATRICS

## 2020-08-19 PROCEDURE — 3051F HG A1C>EQUAL 7.0%<8.0%: CPT | Performed by: PEDIATRICS

## 2020-08-19 PROCEDURE — 3017F COLORECTAL CA SCREEN DOC REV: CPT | Performed by: PEDIATRICS

## 2020-08-19 PROCEDURE — 4040F PNEUMOC VAC/ADMIN/RCVD: CPT | Performed by: PEDIATRICS

## 2020-08-19 PROCEDURE — 1123F ACP DISCUSS/DSCN MKR DOCD: CPT | Performed by: PEDIATRICS

## 2020-08-19 PROCEDURE — G8399 PT W/DXA RESULTS DOCUMENT: HCPCS | Performed by: PEDIATRICS

## 2020-08-19 PROCEDURE — G8427 DOCREV CUR MEDS BY ELIG CLIN: HCPCS | Performed by: PEDIATRICS

## 2020-08-19 PROCEDURE — 2022F DILAT RTA XM EVC RTNOPTHY: CPT | Performed by: PEDIATRICS

## 2020-08-19 PROCEDURE — 1036F TOBACCO NON-USER: CPT | Performed by: PEDIATRICS

## 2020-08-19 RX ORDER — CYANOCOBALAMIN 1000 UG/ML
1000 INJECTION INTRAMUSCULAR; SUBCUTANEOUS ONCE
Status: DISCONTINUED | OUTPATIENT
Start: 2020-08-19 | End: 2020-08-19

## 2020-08-19 RX ORDER — FLUOXETINE HYDROCHLORIDE 40 MG/1
40 CAPSULE ORAL DAILY
Qty: 90 CAPSULE | Refills: 1 | Status: SHIPPED | OUTPATIENT
Start: 2020-08-19 | End: 2021-01-14

## 2020-08-19 ASSESSMENT — ENCOUNTER SYMPTOMS
EYE PAIN: 0
COLOR CHANGE: 0
WHEEZING: 0
DIARRHEA: 1
STRIDOR: 0
CONSTIPATION: 0
CHEST TIGHTNESS: 0
NAUSEA: 0
BACK PAIN: 1
RHINORRHEA: 0
BLOOD IN STOOL: 0
CHOKING: 0
ABDOMINAL PAIN: 0
COUGH: 0
VOMITING: 0
SINUS PRESSURE: 0
EYE REDNESS: 0
TROUBLE SWALLOWING: 0
SHORTNESS OF BREATH: 0
SORE THROAT: 0
EYE DISCHARGE: 0

## 2020-08-19 NOTE — PROGRESS NOTES
Trent Hernandez:      Patient ID: Cortez Guzman is a 65 y.o. female. Visit Information    Have you changed or started any medications since your last visit including any over-the-counter medicines, vitamins, or herbal medicines? no   Are you having any side effects from any of your medications? -  no  Have you stopped taking any of your medications? Is so, why? -  no    Have you seen any other physician or provider since your last visit? Yes   Have you had any other diagnostic tests since your last visit? No  Have you been seen in the emergency room and/or had an admission to a hospital since we last saw you? No  Have you had your routine dental cleaning in the past 6 months? no    Have you activated your [employer] account? If not, what are your barriers?  Yes     Patient Care Team:  Madonna Reddy MD as PCP - General (Internal Medicine)  Madonna Reddy MD as PCP - [facility]  Pamela Sebastian MD as Consulting Physician (Nephrology)  Serena Ragsdale MD as Consulting Physician (Pulmonology)  MD Wale Hopkins MD as Consulting Physician (Endocrinology)  Ekaterina Griffin MD as Consulting Physician (Gastroenterology)    Medical History Review  Past Medical, Family, and Social History reviewed and does contribute to the patient presenting condition    Health Maintenance   Topic Date Due    Shingles Vaccine (2 of 3) 09/28/2012    DTaP/Tdap/Td vaccine (1 - Tdap) 02/14/2021 (Originally 2/3/1969)    Flu vaccine (1) 09/01/2020    Diabetic retinal exam  10/28/2020    Diabetic foot exam  11/25/2020    Lipid screen  01/31/2021    Annual Wellness Visit (AWV)  06/23/2021    A1C test (Diabetic or Prediabetic)  08/17/2021    Potassium monitoring  08/17/2021    Creatinine monitoring  08/17/2021    Breast cancer screen  12/02/2021    Colon cancer screen colonoscopy  01/23/2024    Pneumococcal 65+ years Vaccine  Completed    Hepatitis C screen  Completed    DEXA (modify frequency per FRAX score)  Addressed    Hepatitis A vaccine  Aged Out    Hib vaccine  Aged Out    Meningococcal (ACWY) vaccine  Aged Out       PHQ Scores 6/22/2020 8/4/2017   PHQ2 Score 0 2   PHQ9 Score 0 2     Interpretation of Total Score DepressionSeverity: 1-4 = Minimal depression, 5-9 = Mild depression, 10-14 = Moderate depression, 15-19 = Moderately severe depression, 20-27 = Severe depression    Current Outpatient Medications   Medication Sig Dispense Refill    FLUoxetine (PROZAC) 40 MG capsule Take 1 capsule by mouth daily 90 capsule 1    atorvastatin (LIPITOR) 80 MG tablet Take 1 tablet by mouth daily 90 tablet 1    gabapentin (NEURONTIN) 300 MG capsule Take 1 capsule by mouth 3 times daily. 270 capsule 1    nystatin (MYCOSTATIN) 299832 UNIT/GM powder Apply 3 times daily. 180 g 2    citalopram (CELEXA) 40 MG tablet Take 1 tablet by mouth daily 90 tablet 1    tiZANidine (ZANAFLEX) 2 MG tablet Take 1 tablet by mouth every 8 hours as needed (MUSCLE SPASMS) 270 tablet 1    losartan (COZAAR) 100 MG tablet Take 1 tablet by mouth daily 90 tablet 1    nystatin (MYCOSTATIN) 376129 UNIT/GM ointment Apply topically 2 times daily.  90 g 2    traZODone (DESYREL) 100 MG tablet Take 1 tablet by mouth nightly Take 1 to 2 Tablets every night 180 tablet 1    metFORMIN (GLUCOPHAGE) 500 MG tablet Take 500 mg by mouth 2 times daily (with meals)      BD INSULIN SYRINGE ULTRAFINE 31G X 15/64\" 0.5 ML MISC       NOVOLIN 70/30 (70-30) 100 UNIT/ML injection vial Inject into the skin 2 times daily (with meals) 25 in the AM 22 at night      aspirin 81 MG tablet Take 1 tablet by mouth nightly 30 tablet 3    glimepiride (AMARYL) 4 MG tablet Take 1 tablet by mouth every morning (before breakfast)       B-D ULTRAFINE III SHORT PEN 31G X 8 MM MISC       omeprazole (PRILOSEC) 20 MG capsule Take 20 mg by mouth nightly Indications: GERD-Related Laryngitis       albuterol (PROVENTIL) (2.5 MG/3ML) 0.083% nebulizer to spondylolisthesis in 2016. She does have low back pain intermittently. She is currently taking Percocet only as needed and Neurontin and tizanidine twice daily. Her diabetes type 2 with nephropathy, CKD and neuropathy is followed by her endocrinologist, Dr. Romina Carlson. She takes insulin 70/30 - lowered to 22 units in the am and 22 units at night (if her sugar is under 115 at night she will only take 12 units).  She did have her eye exam with Dr Jie Yousif. Pooja Abbott last HgA1c was 7.4.  She does have CKD with a baseline creatinine of 1.3 for some time. Domonique Wynne has seen Dr. Tracy Alexander in the past but this has been a while. She would prefer not to see nephrology and last her creatinine was stable.  Her foot exam is up to date and was done by endocrinology.    She does have some thoracic spine pain and bilateral hip pain at the end of the day.  She also has left knee pain and right shoulder pain. She was seeing Dr. Darius Rivera for her severe OA of the knee and her shoulder and will need to get back in to see him. Her right has been bothering her some too. She does use a cane for stability. She does have a B12 deficiency and she has B12 injections in the past.  Her most recent B12 was low normal at 259.  She has not been taking B12 supplement. She does have a history of obesity and she is trying to work on this.    She has a history of abnormal thyroid labs and a feeling of something in her throat.  She did have a thyroid ultrasound that showed her that her thyroid gland was mildly heterogeneous and diminutive. She did see her endocrinologist and they did advise her that her thyroid was not that bad and advised her to continue to monitor labs. Her labs remain abnormal and I will fax this to her endocrinologist for her upcoming follow-up. She does have a history of anemia which I suspect is anemia of chronic disease however she is due for colonoscopy and may require EGD as well. I will give her a referral to GI.    She does complain Palpations: Abdomen is soft. There is no mass. Tenderness: There is no abdominal tenderness. There is no right CVA tenderness or left CVA tenderness. Musculoskeletal:      Right shoulder: She exhibits decreased range of motion, tenderness and decreased strength. Left shoulder: She exhibits decreased range of motion and tenderness. Right hip: She exhibits tenderness. She exhibits normal strength. Left hip: She exhibits tenderness. She exhibits normal strength. Right knee: Normal.      Left knee: She exhibits decreased range of motion. Tenderness found. Medial joint line tenderness noted. Lumbar back: She exhibits decreased range of motion and tenderness. Back:       Right lower leg: No edema. Left lower leg: No edema. Feet:    Lymphadenopathy:      Cervical: No cervical adenopathy. Skin:     General: Skin is warm. Capillary Refill: Capillary refill takes less than 2 seconds. Findings: Rash (Multiple excoriated macules over the arms and posterior shoulders) present. Neurological:      General: No focal deficit present. Mental Status: She is alert and oriented to person, place, and time. Cranial Nerves: No cranial nerve deficit. Motor: No abnormal muscle tone. Coordination: Coordination normal.      Deep Tendon Reflexes: Reflexes normal.   Psychiatric:         Attention and Perception: Attention normal.         Mood and Affect: Mood normal. Mood is not anxious or depressed. Speech: Speech normal.         Behavior: Behavior normal.         Thought Content: Thought content normal.         Judgment: Judgment normal.         Assessment:      Diagnosis Orders   1. Essential hypertension  Comprehensive Metabolic Panel   2. Hyperlipidemia, unspecified hyperlipidemia type  Lipid Panel   3. Mild intermittent asthma without complication     4. Chronic seasonal allergic rhinitis due to pollen     5. KRISHNA (obstructive sleep apnea)     6. Central sleep apnea     7. Restless leg syndrome     8. Gastroesophageal reflux disease, esophagitis presence not specified  Fidencio Pandya MD, Gastroenterology, [place]   9. Food sticks on swallowing  Fidencio Pandya MD, Gastroenterology, [place]   10. Moderate single current episode of major depressive disorder ([facility] [address]     11. Anxiety     12. Spondylolisthesis, unspecified spinal region     13. S/P lumbar fusion     14. Diabetes mellitus type 2 with complications ([facility])  Hemoglobin A1C    TSH without Reflex    T4, Free   15. Chronic kidney disease, stage III (moderate) ([facility])     16. Diabetic polyneuropathy associated with type 2 diabetes mellitus ([facility] [address]     17. B12 deficiency  Vitamin B12   18. Abnormal thyroid blood test  TSH without Reflex    T4, Free    Thyroid Antibodies   19. Anemia due to stage 3 chronic kidney disease ([facility])  CBC    Iron and Alessio Cardoza MD, Gastroenterology, [place]   20. Screening for colorectal cancer  Fidencio Pandya MD, Gastroenterology, [place]   21. Localized swelling of right foot              Plan:     Reviewed recent labs  Recommend continue current medications  Recommend subspecialty follow up as scheduled with pulmonary and endocrinology  Daily exercise and healthy diet encouraged  Eye exam yearly / yearly foot exam  Discontinue Celexa  Start Prozac 40 mg once daily  Weight reduction encouraged  GI referral for history of GERD with food sticking on swallowing as well as colonoscopy  Ice to right foot with elevation and compression  Consider podiatry if no improvement  Recommend Tdap and shingles vaccine   Call with concerns      Gracie Cifuentes received counseling on the following healthy behaviors: nutrition, exercise and medication adherence  Reviewed prior labs and health maintenance  Continue current medications, diet and exercise. Discussed use, benefit, and side effects of prescribed medications.   Barriers to medication compliance addressed. Patient given educational materials - see patient instructions  Was a self-tracking handout given in paper form or via [employer]? No    Requested Prescriptions     Signed Prescriptions Disp Refills    FLUoxetine (PROZAC) 40 MG capsule 90 capsule 1     Sig: Take 1 capsule by mouth daily       All patient questions answered. Patient voiced understanding. Quality Measures    Body mass index is 41.27 kg/m². Elevated. Weight control planned discussed Healthy diet and regular exercise. BP: 124/84. Blood pressure is normal. Treatment plan consists of No treatment change needed. Fall Risk 6/22/2020 8/7/2019 6/13/2018 5/17/2017 11/2/2016 2/12/2016 10/1/2014   2 or more falls in past year? no no no no no no no   Fall with injury in past year? no no no yes no no yes     The patient does not have a history of falls. I did not - not indicated , complete a risk assessment for falls.  A plan of care for falls home safety tips provided    Lab Results   Component Value Date    LDLCALC 82 07/29/2013    LDLCHOLESTEROL 69 01/31/2020    (goal LDL reduction with dx if diabetes is 50% LDL reduction)    PHQ Scores 6/22/2020 8/4/2017   PHQ2 Score 0 2   PHQ9 Score 0 2     Interpretation of Total Score Depression Severity: 1-4 = Minimal depression, 5-9 = Mild depression, 10-14 = Moderate depression, 15-19 = Moderately severe depression, 20-27 = Severe depression        Electronically signed by Gordon Garnica MD on 8/25/2020 at 8:24 PM

## 2020-09-02 ENCOUNTER — TELEPHONE (OUTPATIENT)
Dept: FAMILY MEDICINE CLINIC | Age: 70
End: 2020-09-02

## 2020-09-02 NOTE — TELEPHONE ENCOUNTER
Patient called stating that she would like an excuse for jury duty. She states that it is too far of a walk and too much sitting for her to handle. Please advise.

## 2020-09-17 ENCOUNTER — TELEPHONE (OUTPATIENT)
Dept: GASTROENTEROLOGY | Age: 70
End: 2020-09-17

## 2020-09-17 NOTE — TELEPHONE ENCOUNTER
LVM to call and schedule appt from referral - 1attempt  Letter sent out to call and schedule appt from referral - 2nd attempt

## 2020-10-01 RX ORDER — LOSARTAN POTASSIUM 100 MG/1
100 TABLET ORAL DAILY
Qty: 90 TABLET | Refills: 1 | Status: SHIPPED | OUTPATIENT
Start: 2020-10-01 | End: 2021-02-05

## 2020-10-01 NOTE — TELEPHONE ENCOUNTER
LOV 8-19-20  LRF 4-20-20    Health Maintenance   Topic Date Due    Shingles Vaccine (2 of 3) 09/28/2012    Flu vaccine (1) 09/01/2020    DTaP/Tdap/Td vaccine (1 - Tdap) 02/14/2021 (Originally 2/3/1969)    Diabetic retinal exam  10/28/2020    Diabetic foot exam  11/25/2020    Lipid screen  01/31/2021    Annual Wellness Visit (AWV)  06/23/2021    A1C test (Diabetic or Prediabetic)  08/17/2021    Potassium monitoring  08/17/2021    Creatinine monitoring  08/17/2021    Breast cancer screen  12/02/2021    Colon cancer screen colonoscopy  01/23/2024    Pneumococcal 65+ years Vaccine  Completed    Hepatitis C screen  Completed    DEXA (modify frequency per FRAX score)  Addressed    Hepatitis A vaccine  Aged Out    Hib vaccine  Aged Out    Meningococcal (ACWY) vaccine  Aged Out             (applicable per patient's age: Cancer Screenings, Depression Screening, Fall Risk Screening, Immunizations)    Hemoglobin A1C (%)   Date Value   08/17/2020 7.4 (H)   01/31/2020 7.2 (H)   08/05/2019 7.6 (H)     Microalb/Crt.  Ratio (mcg/mg creat)   Date Value   08/18/2020 CANNOT BE CALCULATED     LDL Cholesterol (mg/dL)   Date Value   01/31/2020 69     LDL Calculated (mg/dL)   Date Value   07/29/2013 82     AST (U/L)   Date Value   08/17/2020 16     ALT (U/L)   Date Value   08/17/2020 16     BUN (mg/dL)   Date Value   08/17/2020 22      (goal A1C is < 7)   (goal LDL is <100) need 30-50% reduction from baseline     BP Readings from Last 3 Encounters:   08/19/20 124/84   06/22/20 116/82   05/15/20 122/84    (goal /80)      All Future Testing planned in [facility]:  Lab Frequency Next Occurrence   CBC Once 11/19/2020   Iron and TIBC Once 11/19/2020   Comprehensive Metabolic Panel Once 00/24/8748   Hemoglobin A1C Once 11/19/2020   Lipid Panel Once 11/19/2020   TSH without Reflex Once 11/19/2020   T4, Free Once 11/19/2020   Thyroid Antibodies Once 11/19/2020   Vitamin B12 Once 11/19/2020       Next Visit Date:  Future Appointments   Date Time Provider Eva Dent   11/23/2020  1:30 PM Joshua Leija MD [facility] [address] [facility]   11/30/2020  3:20 PM MD Osmin Scott [address] [facility]            Patient Active Problem List:     Restless leg syndrome     KRISHNA on CPAP     Diabetes mellitus type 2 with complications ([address]     Hyperlipidemia with target LDL less than 100     Asthma     Allergic rhinitis     Essential hypertension     Major depression     Anxiety     Spondylisthesis     S/P lumbar fusion     Gastroesophageal reflux disease     Diabetic nephropathy associated with type 2 diabetes mellitus (HCC)     Moderate single current episode of major depressive disorder ([address]     Irritable bowel syndrome with diarrhea     Morbid obesity with BMI of 40.0-44.9, adult (HCC)     Chronic kidney disease, stage III (moderate)     Diabetic polyneuropathy associated with type 2 diabetes mellitus ([address]     B12 deficiency     Central sleep apnea

## 2020-10-08 RX ORDER — TIZANIDINE 2 MG/1
2 TABLET ORAL EVERY 8 HOURS PRN
Qty: 270 TABLET | Refills: 1 | Status: SHIPPED | OUTPATIENT
Start: 2020-10-08 | End: 2021-05-11

## 2020-10-08 NOTE — TELEPHONE ENCOUNTER
LOV 8-19-20  LRF 5-18-20    Health Maintenance   Topic Date Due    Shingles Vaccine (2 of 3) 09/28/2012    Flu vaccine (1) 09/01/2020    DTaP/Tdap/Td vaccine (1 - Tdap) 02/14/2021 (Originally 2/3/1969)    Diabetic retinal exam  10/28/2020    Diabetic foot exam  11/25/2020    Lipid screen  01/31/2021    Annual Wellness Visit (AWV)  06/23/2021    A1C test (Diabetic or Prediabetic)  08/17/2021    Potassium monitoring  08/17/2021    Creatinine monitoring  08/17/2021    Breast cancer screen  12/02/2021    Colon cancer screen colonoscopy  01/23/2024    Pneumococcal 65+ years Vaccine  Completed    Hepatitis C screen  Completed    DEXA (modify frequency per FRAX score)  Addressed    Hepatitis A vaccine  Aged Out    Hib vaccine  Aged Out    Meningococcal (ACWY) vaccine  Aged Out             (applicable per patient's age: Cancer Screenings, Depression Screening, Fall Risk Screening, Immunizations)    Hemoglobin A1C (%)   Date Value   08/17/2020 7.4 (H)   01/31/2020 7.2 (H)   08/05/2019 7.6 (H)     Microalb/Crt.  Ratio (mcg/mg creat)   Date Value   08/18/2020 CANNOT BE CALCULATED     LDL Cholesterol (mg/dL)   Date Value   01/31/2020 69     LDL Calculated (mg/dL)   Date Value   07/29/2013 82     AST (U/L)   Date Value   08/17/2020 16     ALT (U/L)   Date Value   08/17/2020 16     BUN (mg/dL)   Date Value   08/17/2020 22      (goal A1C is < 7)   (goal LDL is <100) need 30-50% reduction from baseline     BP Readings from Last 3 Encounters:   08/19/20 124/84   06/22/20 116/82   05/15/20 122/84    (goal /80)      All Future Testing planned in [facility]:  Lab Frequency Next Occurrence   CBC Once 11/19/2020   Iron and TIBC Once 11/19/2020   Comprehensive Metabolic Panel Once 04/26/7130   Hemoglobin A1C Once 11/19/2020   Lipid Panel Once 11/19/2020   TSH without Reflex Once 11/19/2020   T4, Free Once 11/19/2020   Thyroid Antibodies Once 11/19/2020   Vitamin B12 Once 11/19/2020       Next Visit Date:  Future Appointments   Date Time Provider Eva Jailene   11/23/2020  1:30 PM MD Nayeli Hassan   11/30/2020  3:20 PM MD Colton Brice            Patient Active Problem List:     Restless leg syndrome     KRISHNA on CPAP     Diabetes mellitus type 2 with complications ([facility] [address]     Hyperlipidemia with target LDL less than 100     Asthma     Allergic rhinitis     Essential hypertension     Major depression     Anxiety     Spondylisthesis     S/P lumbar fusion     Gastroesophageal reflux disease     Diabetic nephropathy associated with type 2 diabetes mellitus (HCC)     Moderate single current episode of major depressive disorder ([address]     Irritable bowel syndrome with diarrhea     Morbid obesity with BMI of 40.0-44.9, adult (HCC)     Chronic kidney disease, stage III (moderate)     Diabetic polyneuropathy associated with type 2 diabetes mellitus ([address]     B12 deficiency     Central sleep apnea

## 2020-11-17 RX ORDER — TRAZODONE HYDROCHLORIDE 100 MG/1
100-200 TABLET ORAL NIGHTLY
Qty: 180 TABLET | Refills: 1 | Status: SHIPPED | OUTPATIENT
Start: 2020-11-17 | End: 2021-09-27 | Stop reason: SDUPTHER

## 2020-11-17 NOTE — TELEPHONE ENCOUNTER
Last visit: 8/19/2020  Last Med refill: 2/11/19  Does patient have enough medication for 72 hours: No:     Next Visit Date:  Future Appointments   Date Time Provider Eva Dent   11/23/2020  1:30 PM MD Nayeli Hassan   11/30/2020  3:20 PM Kadi Enriquez MD [address] Maintenance   Topic Date Due    Shingles Vaccine (2 of 3) 09/28/2012    Flu vaccine (1) 09/01/2020    Diabetic foot exam  11/25/2020    DTaP/Tdap/Td vaccine (1 - Tdap) 02/14/2021 (Originally 2/3/1969)    Lipid screen  01/31/2021    Annual Wellness Visit (AWV)  06/23/2021    A1C test (Diabetic or Prediabetic)  08/17/2021    Potassium monitoring  08/17/2021    Creatinine monitoring  08/17/2021    Diabetic retinal exam  10/29/2021    Breast cancer screen  12/02/2021    Colon cancer screen colonoscopy  01/23/2024    Pneumococcal 65+ years Vaccine  Completed    Hepatitis C screen  Completed    DEXA (modify frequency per FRAX score)  Addressed    Hepatitis A vaccine  Aged Out    Hib vaccine  Aged Out    Meningococcal (ACWY) vaccine  Aged Out       Hemoglobin A1C (%)   Date Value   08/17/2020 7.4 (H)   01/31/2020 7.2 (H)   08/05/2019 7.6 (H)             ( goal A1C is < 7)   Microalb/Crt.  Ratio (mcg/mg creat)   Date Value   08/18/2020 CANNOT BE CALCULATED     LDL Cholesterol (mg/dL)   Date Value   01/31/2020 69   04/15/2019 66     LDL Calculated (mg/dL)   Date Value   07/29/2013 82       (goal LDL is <100)   AST (U/L)   Date Value   08/17/2020 16     ALT (U/L)   Date Value   08/17/2020 16     BUN (mg/dL)   Date Value   08/17/2020 22     BP Readings from Last 3 Encounters:   08/19/20 124/84   06/22/20 116/82   05/15/20 122/84          (goal 120/80)    All Future Testing planned in [facility]  Lab Frequency Next Occurrence   CBC Once 11/19/2020   Iron and TIBC Once 11/19/2020   Comprehensive Metabolic Panel Once 59/59/7217   Hemoglobin A1C Once 11/19/2020   Lipid Panel Once 11/19/2020   TSH without Reflex Once 11/19/2020   T4, Free Once 11/19/2020   Thyroid Antibodies Once 11/19/2020   Vitamin B12 Once 11/19/2020               Patient Active Problem List:     Restless leg syndrome     KRISHNA on CPAP     Diabetes mellitus type 2 with complications ([facility] [address]     Hyperlipidemia with target LDL less than 100     Asthma     Allergic rhinitis     Essential hypertension     Major depression     Anxiety     Spondylisthesis     S/P lumbar fusion     Gastroesophageal reflux disease     Diabetic nephropathy associated with type 2 diabetes mellitus (HCC)     Moderate single current episode of major depressive disorder ([facility] [address]     Irritable bowel syndrome with diarrhea     Morbid obesity with BMI of 40.0-44.9, adult (HCC)     Chronic kidney disease, stage III (moderate)     Diabetic polyneuropathy associated with type 2 diabetes mellitus (HCC)     B12 deficiency     Central sleep apnea     GERD (gastroesophageal reflux disease)     Dysphagia     Anemia due to stage 3 chronic kidney disease     Screening for colorectal cancer

## 2020-11-19 ENCOUNTER — TELEPHONE (OUTPATIENT)
Dept: GASTROENTEROLOGY | Age: 70
End: 2020-11-19

## 2020-11-19 NOTE — TELEPHONE ENCOUNTER
Lmv that appt has been switched to a VV and if they can not do this type of visit to callback and reschedule appt.

## 2020-12-07 RX ORDER — ATORVASTATIN CALCIUM 80 MG/1
80 TABLET, FILM COATED ORAL DAILY
Qty: 90 TABLET | Refills: 1 | Status: SHIPPED | OUTPATIENT
Start: 2020-12-07 | End: 2021-09-27 | Stop reason: SDUPTHER

## 2020-12-07 NOTE — TELEPHONE ENCOUNTER
LOV 8-19-20  LRF 7-15-20    Health Maintenance   Topic Date Due    Shingles Vaccine (2 of 3) 09/28/2012    Flu vaccine (1) 09/01/2020    Diabetic foot exam  11/25/2020    DTaP/Tdap/Td vaccine (1 - Tdap) 02/14/2021 (Originally 2/3/1969)    Lipid screen  01/31/2021    Annual Wellness Visit (AWV)  06/23/2021    A1C test (Diabetic or Prediabetic)  08/17/2021    Potassium monitoring  08/17/2021    Creatinine monitoring  08/17/2021    Diabetic retinal exam  10/29/2021    Breast cancer screen  12/02/2021    Colon cancer screen colonoscopy  01/23/2024    Pneumococcal 65+ years Vaccine  Completed    Hepatitis C screen  Completed    DEXA (modify frequency per FRAX score)  Addressed    Hepatitis A vaccine  Aged Out    Hib vaccine  Aged Out    Meningococcal (ACWY) vaccine  Aged Out             (applicable per patient's age: Cancer Screenings, Depression Screening, Fall Risk Screening, Immunizations)    Hemoglobin A1C (%)   Date Value   08/17/2020 7.4 (H)   01/31/2020 7.2 (H)   08/05/2019 7.6 (H)     Microalb/Crt.  Ratio (mcg/mg creat)   Date Value   08/18/2020 CANNOT BE CALCULATED     LDL Cholesterol (mg/dL)   Date Value   01/31/2020 69     LDL Calculated (mg/dL)   Date Value   07/29/2013 82     AST (U/L)   Date Value   08/17/2020 16     ALT (U/L)   Date Value   08/17/2020 16     BUN (mg/dL)   Date Value   08/17/2020 22      (goal A1C is < 7)   (goal LDL is <100) need 30-50% reduction from baseline     BP Readings from Last 3 Encounters:   08/19/20 124/84   06/22/20 116/82   05/15/20 122/84    (goal /80)      All Future Testing planned in [facility]:  Lab Frequency Next Occurrence   CBC Once 11/19/2020   Iron and TIBC Once 11/19/2020   Comprehensive Metabolic Panel Once 73/45/5380   Hemoglobin A1C Once 11/19/2020   Lipid Panel Once 11/19/2020   TSH without Reflex Once 11/19/2020   T4, Free Once 11/19/2020   Thyroid Antibodies Once 11/19/2020   Vitamin B12 Once 11/19/2020       Next Visit Date:  No future appointments.          Patient Active Problem List:     Restless leg syndrome     KRISHNA on CPAP     Diabetes mellitus type 2 with complications ([facility] [address]     Hyperlipidemia with target LDL less than 100     Asthma     Allergic rhinitis     Essential hypertension     Major depression     Anxiety     Spondylisthesis     S/P lumbar fusion     Gastroesophageal reflux disease     Diabetic nephropathy associated with type 2 diabetes mellitus (HCC)     Moderate single current episode of major depressive disorder (HCC)     Irritable bowel syndrome with diarrhea     Morbid obesity with BMI of 40.0-44.9, adult (HCC)     Chronic kidney disease, stage III (moderate)     Diabetic polyneuropathy associated with type 2 diabetes mellitus (HCC)     B12 deficiency     Central sleep apnea     GERD (gastroesophageal reflux disease)     Dysphagia     Anemia due to stage 3 chronic kidney disease     Screening for colorectal cancer

## 2020-12-28 NOTE — TELEPHONE ENCOUNTER
"  Chief Complaint   Patient presents with   • Earache     patient states that he has a earache on ther right side for at least 2 weeks and states that he has tried otc debrox and has not helped much,     Subjective   Darwin Fraga is a 76 y.o. male who presents to the office for evaluation of pain and discomfort in his right ear.  This started a couple of weeks ago.  There has been no drainage from the ear.  He felt like the ear was stopped up.  He used over-the-counter Debrox but this did not improve his symptoms.    The following portions of the patient's history were reviewed and updated as appropriate: allergies, current medications, past family history, past medical history, past social history, past surgical history and problem list.    Review of Systems   Constitutional: Negative for chills, fatigue and fever.   HENT: Positive for ear pain. Negative for congestion, sneezing, sore throat and trouble swallowing.    Eyes: Negative for visual disturbance.   Respiratory: Negative for cough, chest tightness, shortness of breath and wheezing.    Cardiovascular: Negative for chest pain, palpitations and leg swelling.   Gastrointestinal: Negative for abdominal pain, constipation, diarrhea, nausea and vomiting.   Genitourinary: Negative for dysuria, frequency and urgency.   Musculoskeletal: Negative for neck pain.   Skin: Negative for rash.   Neurological: Negative for dizziness, weakness and headaches.   Psychiatric/Behavioral:        Patient denies any feelings of depression and has not felt down, hopeless or lost interest in any activities.   All other systems reviewed and are negative.      Objective   Vitals:    12/28/20 0954   BP: 124/74   Pulse: 68   Temp: 97.6 °F (36.4 °C)   TempSrc: Oral   SpO2: 99%   Weight: 96.2 kg (212 lb)   Height: 182.9 cm (72\")   PainSc:   4   PainLoc: Ear  Comment: right     Body mass index is 28.75 kg/m².  Physical Exam  Vitals signs and nursing note reviewed.   Constitutional:       " Lov: 5/15/20  Lrf: 11/13/19  Na: 6/22/20  Health Maintenance   Topic Date Due    Shingles Vaccine (2 of 3) 09/28/2012    DTaP/Tdap/Td vaccine (1 - Tdap) 02/14/2021 (Originally 2/3/1969)    Annual Wellness Visit (AWV)  08/07/2021 (Originally 5/29/2019)    Diabetic retinal exam  10/28/2020    Diabetic foot exam  11/25/2020    A1C test (Diabetic or Prediabetic)  01/31/2021    Lipid screen  01/31/2021    Potassium monitoring  01/31/2021    Creatinine monitoring  01/31/2021    Breast cancer screen  12/02/2021    Colon cancer screen colonoscopy  01/23/2024    Flu vaccine  Completed    Pneumococcal 65+ years Vaccine  Completed    Hepatitis C screen  Completed    DEXA (modify frequency per FRAX score)  Addressed    Hepatitis A vaccine  Aged Out    Hib vaccine  Aged Out    Meningococcal (ACWY) vaccine  Aged Out             (applicable per patient's age: Cancer Screenings, Depression Screening, Fall Risk Screening, Immunizations)    Hemoglobin A1C (%)   Date Value   01/31/2020 7.2 (H)   08/05/2019 7.6 (H)   09/10/2018 7.1 (H)     Microalb/Crt.  Ratio (mcg/mg creat)   Date Value   09/10/2018 13   09/10/2018 13     LDL Cholesterol (mg/dL)   Date Value   01/31/2020 69     LDL Calculated (mg/dL)   Date Value   07/29/2013 82     AST (U/L)   Date Value   01/31/2020 16     ALT (U/L)   Date Value   01/31/2020 14     BUN (mg/dL)   Date Value   01/31/2020 18      (goal A1C is < 7)   (goal LDL is <100) need 30-50% reduction from baseline     BP Readings from Last 3 Encounters:   05/15/20 122/84   02/14/20 120/76   11/13/19 122/74    (goal /80)      All Future Testing planned in [facility]:  Lab Frequency Next Occurrence   Microalbumin, Ur Once 11/20/2019   Comprehensive Metabolic Panel Once 56/44/7525   CBC Once 05/03/2020   Hemoglobin A1C Once 05/03/2020   Microalbumin, Ur Once 05/03/2020   TSH Once 05/03/2020   T4, Free Once 05/03/2020   Iron and TIBC Once 04/30/2020   Hepatitis B Surface Antibody Once 04/30/2020 General: He is not in acute distress.     Appearance: He is well-developed.   HENT:      Head: Normocephalic and atraumatic.      Ears:      Comments: The right TM is erythematous.  It is also retracted with a mild effusion.     Nose: Nose normal.   Eyes:      General: No scleral icterus.     Conjunctiva/sclera: Conjunctivae normal.      Pupils: Pupils are equal, round, and reactive to light.   Neck:      Musculoskeletal: Normal range of motion and neck supple.   Cardiovascular:      Rate and Rhythm: Normal rate and regular rhythm.      Heart sounds: Normal heart sounds. No murmur. No friction rub. No gallop.    Pulmonary:      Effort: Pulmonary effort is normal. No respiratory distress.      Breath sounds: Normal breath sounds. No wheezing or rales.   Musculoskeletal: Normal range of motion.   Lymphadenopathy:      Cervical: No cervical adenopathy.   Skin:     General: Skin is warm and dry.      Findings: No rash.   Neurological:      Mental Status: He is alert and oriented to person, place, and time.      Cranial Nerves: No cranial nerve deficit.   Psychiatric:         Behavior: Behavior normal.         Thought Content: Thought content normal.         Judgment: Judgment normal.         Assessment/Plan   Diagnoses and all orders for this visit:    1. Dysfunction of right eustachian tube (Primary)  -     fluticasone (FLONASE) 50 MCG/ACT nasal spray; 2 sprays into the nostril(s) as directed by provider Daily. Administer 2 sprays in each nostril for each dose.  Dispense: 1 bottle; Refill: 0    2. Non-recurrent acute serous otitis media of right ear  -     cefdinir (OMNICEF) 300 MG capsule; Take 1 capsule by mouth 2 (Two) Times a Day for 10 days.  Dispense: 20 capsule; Refill: 0    He is given Omnicef for treatment of the otitis media.  He has also given Flonase nasal spray for the eustachian tube dysfunction.  He will also take Mucinex over-the-counter to help with the eustachian tube dysfunction.  He will let me know  Vitamin B12 Once 04/30/2020       Next Visit Date:  Future Appointments   Date Time Provider Eva Dent   6/22/2020  4:00 PM MD Ángel Roy [facility]   8/19/2020  3:00 PM MD [facility]            Patient Active Problem List:     Restless leg syndrome     KRISHNA on CPAP     Diabetes mellitus type 2 with complications ([facility] [address]     Hyperlipidemia with target LDL less than 100     Asthma     Allergic rhinitis     Essential hypertension     Major depression     Anxiety     Spondylisthesis     S/P lumbar fusion     Gastroesophageal reflux disease     Diabetic nephropathy associated with type 2 diabetes mellitus (HCC)     Moderate single current episode of major depressive disorder (HCC)     Irritable bowel syndrome with diarrhea     Morbid obesity with BMI of 40.0-44.9, adult (HCC)     Chronic kidney disease, stage III (moderate) (HCC)     Diabetic polyneuropathy associated with type 2 diabetes mellitus ([facility] [address]     B12 deficiency     Central sleep apnea if symptoms do not improve within the next week.         PHQ-2/PHQ-9 Depression Screening 12/28/2020   Little interest or pleasure in doing things 0   Feeling down, depressed, or hopeless 0   Trouble falling or staying asleep, or sleeping too much -   Feeling tired or having little energy -   Poor appetite or overeating -   Feeling bad about yourself - or that you are a failure or have let yourself or your family down -   Trouble concentrating on things, such as reading the newspaper or watching television -   Moving or speaking so slowly that other people could have noticed. Or the opposite - being so fidgety or restless that you have been moving around a lot more than usual -   Thoughts that you would be better off dead, or of hurting yourself in some way -   Total Score 0

## 2020-12-29 ENCOUNTER — HOSPITAL ENCOUNTER (OUTPATIENT)
Age: 70
Setting detail: SPECIMEN
Discharge: HOME | End: 2020-12-29
Payer: MEDICARE

## 2020-12-29 LAB
ALBUMIN SERPL-MCNC: 3.5 G/DL (ref 3.5–5.2)
ALBUMIN/GLOBULIN RATIO: 1.3 (ref 1–2.5)
ALP BLD-CCNC: 180 U/L (ref 35–104)
ALT SERPL-CCNC: 12 U/L (ref 5–33)
ANION GAP SERPL CALCULATED.3IONS-SCNC: 13 MMOL/L (ref 9–17)
AST SERPL-CCNC: 14 U/L
BILIRUB SERPL-MCNC: 0.26 MG/DL (ref 0.3–1.2)
BUN BLDV-MCNC: 16 MG/DL (ref 8–23)
BUN/CREAT BLD: ABNORMAL (ref 9–20)
CALCIUM SERPL-MCNC: 9.1 MG/DL (ref 8.6–10.4)
CHLORIDE BLD-SCNC: 107 MMOL/L (ref 98–107)
CHOLESTEROL/HDL RATIO: 3.4
CHOLESTEROL: 134 MG/DL
CO2: 22 MMOL/L (ref 20–31)
CREAT SERPL-MCNC: 1.19 MG/DL (ref 0.5–0.9)
ESTIMATED AVERAGE GLUCOSE: 151 MG/DL
GFR AFRICAN AMERICAN: 54 ML/MIN
GFR NON-AFRICAN AMERICAN: 45 ML/MIN
GFR SERPL CREATININE-BSD FRML MDRD: ABNORMAL ML/MIN/{1.73_M2}
GFR SERPL CREATININE-BSD FRML MDRD: ABNORMAL ML/MIN/{1.73_M2}
GLUCOSE BLD-MCNC: 93 MG/DL (ref 70–99)
HBA1C MFR BLD: 6.9 % (ref 4–6)
HCT VFR BLD CALC: 33.5 % (ref 36.3–47.1)
HDLC SERPL-MCNC: 39 MG/DL
HEMOGLOBIN: 10.3 G/DL (ref 11.9–15.1)
IRON SATURATION: 17 % (ref 20–55)
IRON: 50 UG/DL (ref 37–145)
LDL CHOLESTEROL: 69 MG/DL (ref 0–130)
MCH RBC QN AUTO: 26.8 PG (ref 25.2–33.5)
MCHC RBC AUTO-ENTMCNC: 30.7 G/DL (ref 28.4–34.8)
MCV RBC AUTO: 87 FL (ref 82.6–102.9)
NRBC AUTOMATED: 0 PER 100 WBC
PDW BLD-RTO: 14.8 % (ref 11.8–14.4)
PLATELET # BLD: 254 K/UL (ref 138–453)
PMV BLD AUTO: 9.4 FL (ref 8.1–13.5)
POTASSIUM SERPL-SCNC: 4.2 MMOL/L (ref 3.7–5.3)
RBC # BLD: 3.85 M/UL (ref 3.95–5.11)
SODIUM BLD-SCNC: 142 MMOL/L (ref 135–144)
THYROXINE, FREE: 0.99 NG/DL (ref 0.93–1.7)
TOTAL IRON BINDING CAPACITY: 298 UG/DL (ref 250–450)
TOTAL PROTEIN: 6.3 G/DL (ref 6.4–8.3)
TRIGL SERPL-MCNC: 132 MG/DL
TSH SERPL DL<=0.05 MIU/L-ACNC: 3.17 MIU/L (ref 0.3–5)
UNSATURATED IRON BINDING CAPACITY: 248 UG/DL (ref 112–347)
VITAMIN B-12: 308 PG/ML (ref 232–1245)
VLDLC SERPL CALC-MCNC: ABNORMAL MG/DL (ref 1–30)
WBC # BLD: 7.3 K/UL (ref 3.5–11.3)

## 2020-12-30 ENCOUNTER — OFFICE VISIT (OUTPATIENT)
Dept: FAMILY MEDICINE CLINIC | Age: 70
End: 2020-12-30
Payer: MEDICARE

## 2020-12-30 VITALS
BODY MASS INDEX: 39.09 KG/M2 | DIASTOLIC BLOOD PRESSURE: 72 MMHG | WEIGHT: 220.6 LBS | HEART RATE: 67 BPM | TEMPERATURE: 96.5 F | OXYGEN SATURATION: 97 % | RESPIRATION RATE: 20 BRPM | SYSTOLIC BLOOD PRESSURE: 126 MMHG | HEIGHT: 63 IN

## 2020-12-30 DIAGNOSIS — N18.30 ANEMIA DUE TO STAGE 3 CHRONIC KIDNEY DISEASE, UNSPECIFIED WHETHER STAGE 3A OR 3B CKD (HCC): ICD-10-CM

## 2020-12-30 DIAGNOSIS — G47.31 CENTRAL SLEEP APNEA: ICD-10-CM

## 2020-12-30 DIAGNOSIS — I10 ESSENTIAL HYPERTENSION: Primary | ICD-10-CM

## 2020-12-30 DIAGNOSIS — E11.8 DIABETES MELLITUS TYPE 2 WITH COMPLICATIONS (HCC): ICD-10-CM

## 2020-12-30 DIAGNOSIS — G47.33 OSA (OBSTRUCTIVE SLEEP APNEA): ICD-10-CM

## 2020-12-30 DIAGNOSIS — F32.1 MODERATE SINGLE CURRENT EPISODE OF MAJOR DEPRESSIVE DISORDER (HCC): ICD-10-CM

## 2020-12-30 DIAGNOSIS — G25.81 RESTLESS LEG SYNDROME: ICD-10-CM

## 2020-12-30 DIAGNOSIS — J45.20 MILD INTERMITTENT ASTHMA WITHOUT COMPLICATION: ICD-10-CM

## 2020-12-30 DIAGNOSIS — J30.1 CHRONIC SEASONAL ALLERGIC RHINITIS DUE TO POLLEN: ICD-10-CM

## 2020-12-30 DIAGNOSIS — E53.8 B12 DEFICIENCY: ICD-10-CM

## 2020-12-30 DIAGNOSIS — E11.42 DIABETIC POLYNEUROPATHY ASSOCIATED WITH TYPE 2 DIABETES MELLITUS (HCC): ICD-10-CM

## 2020-12-30 DIAGNOSIS — F41.9 ANXIETY: ICD-10-CM

## 2020-12-30 DIAGNOSIS — B37.2 YEAST DERMATITIS: ICD-10-CM

## 2020-12-30 DIAGNOSIS — K21.9 GASTROESOPHAGEAL REFLUX DISEASE WITHOUT ESOPHAGITIS: ICD-10-CM

## 2020-12-30 DIAGNOSIS — M17.12 PRIMARY OSTEOARTHRITIS OF LEFT KNEE: ICD-10-CM

## 2020-12-30 DIAGNOSIS — R26.89 BALANCE PROBLEM: ICD-10-CM

## 2020-12-30 DIAGNOSIS — Z98.1 S/P LUMBAR FUSION: ICD-10-CM

## 2020-12-30 DIAGNOSIS — E78.5 HYPERLIPIDEMIA, UNSPECIFIED HYPERLIPIDEMIA TYPE: ICD-10-CM

## 2020-12-30 DIAGNOSIS — D63.1 ANEMIA DUE TO STAGE 3 CHRONIC KIDNEY DISEASE, UNSPECIFIED WHETHER STAGE 3A OR 3B CKD (HCC): ICD-10-CM

## 2020-12-30 DIAGNOSIS — N18.30 STAGE 3 CHRONIC KIDNEY DISEASE, UNSPECIFIED WHETHER STAGE 3A OR 3B CKD (HCC): ICD-10-CM

## 2020-12-30 DIAGNOSIS — R79.89 ABNORMAL THYROID BLOOD TEST: ICD-10-CM

## 2020-12-30 DIAGNOSIS — E11.21 DIABETIC NEPHROPATHY ASSOCIATED WITH TYPE 2 DIABETES MELLITUS (HCC): ICD-10-CM

## 2020-12-30 LAB
THYROGLOBULIN AB: <20 IU/ML (ref 0–40)
THYROID PEROXIDASE (TPO) AB: <10 IU/ML (ref 0–35)

## 2020-12-30 PROCEDURE — G8427 DOCREV CUR MEDS BY ELIG CLIN: HCPCS | Performed by: PEDIATRICS

## 2020-12-30 PROCEDURE — G8484 FLU IMMUNIZE NO ADMIN: HCPCS | Performed by: PEDIATRICS

## 2020-12-30 PROCEDURE — 3288F FALL RISK ASSESSMENT DOCD: CPT | Performed by: PEDIATRICS

## 2020-12-30 PROCEDURE — 1090F PRES/ABSN URINE INCON ASSESS: CPT | Performed by: PEDIATRICS

## 2020-12-30 PROCEDURE — 99214 OFFICE O/P EST MOD 30 MIN: CPT | Performed by: PEDIATRICS

## 2020-12-30 PROCEDURE — 3017F COLORECTAL CA SCREEN DOC REV: CPT | Performed by: PEDIATRICS

## 2020-12-30 PROCEDURE — 0518F FALL PLAN OF CARE DOCD: CPT | Performed by: PEDIATRICS

## 2020-12-30 PROCEDURE — 2022F DILAT RTA XM EVC RTNOPTHY: CPT | Performed by: PEDIATRICS

## 2020-12-30 PROCEDURE — G8417 CALC BMI ABV UP PARAM F/U: HCPCS | Performed by: PEDIATRICS

## 2020-12-30 PROCEDURE — 1036F TOBACCO NON-USER: CPT | Performed by: PEDIATRICS

## 2020-12-30 PROCEDURE — 1123F ACP DISCUSS/DSCN MKR DOCD: CPT | Performed by: PEDIATRICS

## 2020-12-30 PROCEDURE — 4040F PNEUMOC VAC/ADMIN/RCVD: CPT | Performed by: PEDIATRICS

## 2020-12-30 PROCEDURE — G8399 PT W/DXA RESULTS DOCUMENT: HCPCS | Performed by: PEDIATRICS

## 2020-12-30 PROCEDURE — 3044F HG A1C LEVEL LT 7.0%: CPT | Performed by: PEDIATRICS

## 2020-12-30 RX ORDER — NYSTATIN 100000 U/G
OINTMENT TOPICAL 2 TIMES DAILY
Qty: 3 TUBE | Refills: 0 | Status: SHIPPED | OUTPATIENT
Start: 2020-12-30 | End: 2021-02-24

## 2020-12-30 RX ORDER — GABAPENTIN 300 MG/1
300 CAPSULE ORAL 3 TIMES DAILY
Qty: 270 CAPSULE | Refills: 1 | Status: SHIPPED | OUTPATIENT
Start: 2020-12-30 | End: 2021-10-20

## 2020-12-30 NOTE — PROGRESS NOTES
Pneumococcal 65+ years Vaccine  Completed    Hepatitis C screen  Completed    DEXA (modify frequency per FRAX score)  Addressed    Hepatitis A vaccine  Aged Out    Hib vaccine  Aged Out    Meningococcal (ACWY) vaccine  Aged Out       PHQ Scores 6/22/2020 8/4/2017   PHQ2 Score 0 2   PHQ9 Score 0 2     Interpretation of Total Score DepressionSeverity: 1-4 = Minimal depression, 5-9 = Mild depression, 10-14 = Moderate depression, 15-19 = Moderately severe depression, 20-27 = Severe depression    Current Outpatient Medications   Medication Sig Dispense Refill    nystatin (MYCOSTATIN) 144177 UNIT/GM ointment Apply topically 2 times daily Apply topically 2 times daily. 3 Tube 0    gabapentin (NEURONTIN) 300 MG capsule Take 1 capsule by mouth 3 times daily. 270 capsule 1    atorvastatin (LIPITOR) 80 MG tablet Take 1 tablet by mouth daily 90 tablet 1    traZODone (DESYREL) 100 MG tablet Take 1-2 tablets by mouth nightly 180 tablet 1    tiZANidine (ZANAFLEX) 2 MG tablet Take 1 tablet by mouth every 8 hours as needed (muscle spasms) 270 tablet 1    losartan (COZAAR) 100 MG tablet Take 1 tablet by mouth daily 90 tablet 1    FLUoxetine (PROZAC) 40 MG capsule Take 1 capsule by mouth daily 90 capsule 1    nystatin (MYCOSTATIN) 511792 UNIT/GM powder Apply 3 times daily.  180 g 2    metFORMIN (GLUCOPHAGE) 500 MG tablet Take 500 mg by mouth 2 times daily (with meals)      BD INSULIN SYRINGE ULTRAFINE 31G X 15/64\" 0.5 ML MISC       NOVOLIN 70/30 (70-30) 100 UNIT/ML injection vial Inject into the skin 2 times daily (with meals) 18 in the AM 18 at night      ACCU-CHEK TARIQ PLUS strip       aspirin 81 MG tablet Take 1 tablet by mouth nightly 30 tablet 3    glimepiride (AMARYL) 4 MG tablet Take 1 tablet by mouth every morning (before breakfast)       B-D ULTRAFINE III SHORT PEN 31G X 8 MM MISC       omeprazole (PRILOSEC) 20 MG capsule Take 20 mg by mouth nightly Indications: GERD-Related Laryngitis       citalopram (CELEXA) 40 MG tablet Take 1 tablet by mouth daily (Patient not taking: Reported on 12/30/2020) 90 tablet 1    oxyCODONE-acetaminophen (PERCOCET) 5-325 MG per tablet Take 2 tablets by mouth every 4 hours as needed (spondylisis) for up to 30 days. 30 tablet 0    albuterol (PROVENTIL) (2.5 MG/3ML) 0.083% nebulizer solution Take 2.5 mg by nebulization every 4 hours as needed for Wheezing.  Multiple Vitamins-Minerals (MULTI COMPLETE PO) Take 1 tablet by mouth nightly       Probiotic Product ([address]) Take 1 tablet by mouth nightly        No current facility-administered medications for this visit. HPI    Patient presents today for routine follow up of her chronic medical problems including hypertension, hyperlipidemia, asthma, allergies, KRISHNA, central sleep apnea, restless leg syndrome, GERD, depression, anxiety, spondylolisthesis s/p lumbar fusion.  She also has a history of diabetes type 2 with chronic kidney disease and neuropathy.  She has also been treated for B12 deficiency and she recently did have abnormal thyroid blood tests c/w hypothyroidism. She also has a history of anemia. Her blood pressure is well controlled on losartan. She is taking and tolerating lipitor for cholesterol without side effects. Her asthma and allergies are well controlled with singulair. Her KRISHNA and central sleep apnea is treated now with a servovent and she is feeling much better. She is following with her pulmonologist, Dr. Saud Galeana. Giselle Regalado did have a new sleep study in 2019.  She states that her follow up with him was canceled because of COVID so she does need to get this rescheduled. Her restless leg is stable with neurontin. She is taking 3 tabs at night. Her GERD is controlled with prilosec. Once in a while tums. She was supposed to be evaluated by GI because her pills stick when she is taking them and she complained of a globus sensation.  She reports they canceled on her as well and this needs to be rescheduled too. She does have a history of IBS and gets diarrhea intermittently. She states her depression and anxiety is fairly well controlled with prozac daily and ativan as needed. She thinks the prozac might make her a little shaky. She reports that she is feeling somewhat depressed mostly because of covid and not being able to do much.  She does not want to change her medication at this time and she does not wish to see a counselor. She states that she has not used her Ativan in a long time. She also underwent a posterior lumbar interbody fusion of L4/L5 due to spondylolisthesis in 2016. She does have low back pain intermittently. She is currently taking Percocet only as needed and Neurontin and tizanidine twice daily. She does have a history of diabetes type 2 with nephropathy, CKD and neuropathy is followed by her new endocrinologist, Dr. Diego Thomas. She takes insulin 70/30 and continues to have low sugars - endo lowered her insulin to 18 units in the am and 18 units at night. She is still taking her glimeperide and metformin. She tells me that she is actually only taking her insulin once daily because she is only eating once daily. She has lost 13 lbs since her last visit. She states she is feeling better doing this. I advised her that she should have some time of diabetic snacks throughout the day.   She did have her eye exam with Dr Blade Albarran. Jennifer Gallo last HgA1c was 6.9.  She does have CKD with a baseline creatinine of 1.3 for some time. Gavinbita Johnsonkhadijah has seen Dr. Orlin Gill in the past but this has been a while. She would prefer not to see nephrology and last her creatinine has actually improved with her most recent labs likely because she is drinking more water.  Her foot exam needs done.    She also has left knee pain secondary to OA. She was seeing Dr. Tonya Torres for her severe OA of the knee and will need to get back in to see him. Jennifer Gallo left shoulder has arthritis and bothers her too.   She Herminio Ching. Cardiovascular: Negative for chest pain, palpitations and leg swelling. Gastrointestinal: Positive for diarrhea (secondary to irritable bowel - improved with [employer] but she does have intermittent episodes once per week). Negative for abdominal pain, blood in stool, constipation, nausea and vomiting. GERD treated with omeprazole and tums occasionally   Endocrine: Negative for polydipsia, polyphagia and polyuria. History of abnormal thyroid labs    Genitourinary: Negative for dysuria, pelvic pain and urgency. Musculoskeletal: Positive for arthralgias (right shoulder pain chronic / left knee pain) and back pain (Mid back pain occasionally). Negative for myalgias and neck pain. Skin: Positive for rash (multiple excoriated macules over the arms and back - c/w neurodermatitis - no change after changing celexa to prozac). Negative for color change and wound. Allergic/Immunologic: Negative for immunocompromised state. Neurological: Positive for numbness. Negative for dizziness, tremors, weakness, light-headedness and headaches. Hematological: Negative for adenopathy. Does not bruise/bleed easily. History of mild anemia   Psychiatric/Behavioral: Positive for dysphoric mood (stable with prozac) and sleep disturbance (stable with trazodone). Negative for agitation, confusion and decreased concentration. The patient is nervous/anxious (stable). Objective:     /72 (Site: Left Upper Arm, Position: Sitting, Cuff Size: Large Adult)   Pulse 67   Temp 96.5 °F (35.8 °C) (Temporal)   Resp 20   Ht 5' 3\" (1.6 m)   Wt 220 lb 9.6 oz (100.1 kg)   SpO2 97%   BMI 39.08 kg/m²        Physical Exam  Vitals signs and nursing note reviewed. Constitutional:       General: She is not in acute distress. Appearance: Normal appearance. She is well-developed. She is obese. She is not ill-appearing, toxic-appearing or diaphoretic.       Comments: obese   HENT: sensed. Left foot:      Protective Sensation: 6 sites tested. 6 sites sensed. Comments: Sensation is decreased over the feet - all areas sensed but with mild decreased sensation  Lymphadenopathy:      Cervical: No cervical adenopathy. Skin:     General: Skin is warm. Capillary Refill: Capillary refill takes less than 2 seconds. Findings: Rash (Multiple excoriated macules over the arms and posterior shoulders) present. Neurological:      General: No focal deficit present. Mental Status: She is alert and oriented to person, place, and time. Cranial Nerves: No cranial nerve deficit. Motor: No abnormal muscle tone. Coordination: Coordination normal.      Deep Tendon Reflexes: Reflexes normal.   Psychiatric:         Attention and Perception: Attention normal.         Mood and Affect: Mood normal. Mood is not anxious or depressed. Speech: Speech normal.         Behavior: Behavior normal.         Thought Content: Thought content normal.         Judgment: Judgment normal.         Assessment:      Diagnosis Orders   1. Essential hypertension     2. Hyperlipidemia, unspecified hyperlipidemia type     3. Mild intermittent asthma without complication     4. Chronic seasonal allergic rhinitis due to pollen     5. KRISHNA (obstructive sleep apnea)     6. Central sleep apnea     7. Restless leg syndrome  gabapentin (NEURONTIN) 300 MG capsule   8. Gastroesophageal reflux disease without esophagitis     9. Moderate single current episode of major depressive disorder ([address]     10. Anxiety     11. S/P lumbar fusion     12. Diabetes mellitus type 2 with complications ([address]     13. Diabetic nephropathy associated with type 2 diabetes mellitus ([address]     14. Stage 3 chronic kidney disease, unspecified whether stage 3a or 3b CKD     15. Diabetic polyneuropathy associated with type 2 diabetes mellitus (HCC)  HM DIABETES FOOT EXAM   16. Primary osteoarthritis of left knee     17.  Balance problem     18. B12 deficiency     19. Abnormal thyroid blood test     20. Anemia due to stage 3 chronic kidney disease, unspecified whether stage 3a or 3b CKD     21. Yeast dermatitis  nystatin (MYCOSTATIN) 741219 UNIT/GM ointment       Plan:     Proceed with review of recent labs  Continue current medications  Follow-up with pulmonary  Follow-up with GI  Continue Prozac  Gentle stretching exercises recommended  Follow-up with endocrinology  Yearly eye exam and yearly foot exam recommended  Follow-up with Ortho  Consider physical therapy for strengthening  Refill nystatin to use as needed  Recommend shingles vaccine / Tdap vaccine   Call with concerns          Aleah Campbell received counseling on the following healthy behaviors: nutrition, exercise and medication adherence  Reviewed prior labs and health maintenance  Continue current medications, diet and exercise. Discussed use, benefit, and side effects of prescribed medications. Barriers to medication compliance addressed. Patient given educational materials - see patient instructions  Was a self-tracking handout given in paper form or via [employer]? No    Requested Prescriptions     Signed Prescriptions Disp Refills    nystatin (MYCOSTATIN) 180253 UNIT/GM ointment 3 Tube 0     Sig: Apply topically 2 times daily Apply topically 2 times daily.  gabapentin (NEURONTIN) 300 MG capsule 270 capsule 1     Sig: Take 1 capsule by mouth 3 times daily. All patient questions answered. Patient voiced understanding. Quality Measures    Body mass index is 39.08 kg/m². Elevated. Weight control planned discussed Healthy diet and regular exercise. BP: 126/72. Blood pressure is normal. Treatment plan consists of No treatment change needed. Fall Risk 6/22/2020 8/7/2019 6/13/2018 5/17/2017 11/2/2016 2/12/2016 10/1/2014   2 or more falls in past year? no no no no no no no   Fall with injury in past year? no no no yes no no yes     The patient has a history of falls.  I did not - not indicated , complete a risk assessment for falls.  A plan of care for falls home safety tips provided, consider physical therapy for strengthening    Lab Results   Component Value Date    LDLCALC 82 07/29/2013    LDLCHOLESTEROL 69 12/29/2020    (goal LDL reduction with dx if diabetes is 50% LDL reduction)    PHQ Scores 6/22/2020 8/4/2017   PHQ2 Score 0 2   PHQ9 Score 0 2     Interpretation of Total Score Depression Severity: 1-4 = Minimal depression, 5-9 = Mild depression, 10-14 = Moderate depression, 15-19 = Moderately severe depression, 20-27 = Severe depression      Electronically signed by Ruperto Rivas MD on 1/3/2021 at 10:41 AM

## 2021-01-03 ASSESSMENT — ENCOUNTER SYMPTOMS
STRIDOR: 0
EYE PAIN: 0
CONSTIPATION: 0
EYE REDNESS: 0
BACK PAIN: 1
SORE THROAT: 0
EYE DISCHARGE: 0
VOMITING: 0
WHEEZING: 0
SHORTNESS OF BREATH: 0
RHINORRHEA: 0
SINUS PRESSURE: 0
CHEST TIGHTNESS: 0
COUGH: 0
BLOOD IN STOOL: 0
COLOR CHANGE: 0
TROUBLE SWALLOWING: 0
ABDOMINAL PAIN: 0
CHOKING: 0
DIARRHEA: 1
NAUSEA: 0

## 2021-01-14 RX ORDER — FLUOXETINE HYDROCHLORIDE 40 MG/1
40 CAPSULE ORAL DAILY
Qty: 90 CAPSULE | Refills: 1 | Status: SHIPPED | OUTPATIENT
Start: 2021-01-14 | End: 2021-07-30

## 2021-01-14 NOTE — TELEPHONE ENCOUNTER
LOV 12-30-20  LRF 8-19-20    Health Maintenance   Topic Date Due    Shingles Vaccine (2 of 3) 09/28/2012    DTaP/Tdap/Td vaccine (1 - Tdap) 02/14/2021 (Originally 2/3/1969)    Annual Wellness Visit (AWV)  06/23/2021    Diabetic retinal exam  10/29/2021    Breast cancer screen  12/02/2021    A1C test (Diabetic or Prediabetic)  12/29/2021    Lipid screen  12/29/2021    Potassium monitoring  12/29/2021    Creatinine monitoring  12/29/2021    Diabetic foot exam  12/30/2021    Colon cancer screen colonoscopy  01/23/2024    Flu vaccine  Completed    Pneumococcal 65+ years Vaccine  Completed    Hepatitis C screen  Completed    DEXA (modify frequency per FRAX score)  Addressed    Hepatitis A vaccine  Aged Out    Hib vaccine  Aged Out    Meningococcal (ACWY) vaccine  Aged Out             (applicable per patient's age: Cancer Screenings, Depression Screening, Fall Risk Screening, Immunizations)    Hemoglobin A1C (%)   Date Value   12/29/2020 6.9 (H)   08/17/2020 7.4 (H)   01/31/2020 7.2 (H)     Microalb/Crt.  Ratio (mcg/mg creat)   Date Value   08/18/2020 CANNOT BE CALCULATED     LDL Cholesterol (mg/dL)   Date Value   12/29/2020 69     LDL Calculated (mg/dL)   Date Value   07/29/2013 82     AST (U/L)   Date Value   12/29/2020 14     ALT (U/L)   Date Value   12/29/2020 12     BUN (mg/dL)   Date Value   12/29/2020 16      (goal A1C is < 7)   (goal LDL is <100) need 30-50% reduction from baseline     BP Readings from Last 3 Encounters:   12/30/20 126/72   08/19/20 124/84   06/22/20 116/82    (goal /80)      All Future Testing planned in [facility]:  Lab Frequency Next Occurrence       Next Visit Date:  Future Appointments   Date Time Provider Eva Dent   3/29/2021  3:40 PM MD Betina Jose [facility] [address] [facility]            Patient Active Problem List:     Restless leg syndrome     KRISHNA on CPAP     Diabetes mellitus type 2 with complications ([address]     Hyperlipidemia with target LDL less than 100     Asthma     Allergic rhinitis     Essential hypertension     Major depression     Anxiety     Spondylisthesis     S/P lumbar fusion     Gastroesophageal reflux disease     Diabetic nephropathy associated with type 2 diabetes mellitus (HCC)     Moderate single current episode of major depressive disorder (HCC)     Irritable bowel syndrome with diarrhea     Morbid obesity with BMI of 40.0-44.9, adult (HCC)     Chronic kidney disease, stage III (moderate)     Diabetic polyneuropathy associated with type 2 diabetes mellitus (HCC)     B12 deficiency     Central sleep apnea     GERD (gastroesophageal reflux disease)     Dysphagia     Anemia due to stage 3 chronic kidney disease

## 2021-01-20 DIAGNOSIS — B37.2 YEAST DERMATITIS: ICD-10-CM

## 2021-01-21 RX ORDER — NYSTATIN 100000 [USP'U]/G
POWDER TOPICAL
Qty: 180 G | Refills: 2 | Status: SHIPPED | OUTPATIENT
Start: 2021-01-21 | End: 2021-08-20

## 2021-01-21 NOTE — TELEPHONE ENCOUNTER
LOV 12-30-20  LRF 6-18-20    Health Maintenance   Topic Date Due    Shingles Vaccine (2 of 3) 09/28/2012    DTaP/Tdap/Td vaccine (1 - Tdap) 02/14/2021 (Originally 2/3/1969)    Annual Wellness Visit (AWV)  06/23/2021    Diabetic retinal exam  10/29/2021    Breast cancer screen  12/02/2021    A1C test (Diabetic or Prediabetic)  12/29/2021    Lipid screen  12/29/2021    Potassium monitoring  12/29/2021    Creatinine monitoring  12/29/2021    Diabetic foot exam  12/30/2021    Colon cancer screen colonoscopy  01/23/2024    Flu vaccine  Completed    Pneumococcal 65+ years Vaccine  Completed    Hepatitis C screen  Completed    DEXA (modify frequency per FRAX score)  Addressed    Hepatitis A vaccine  Aged Out    Hib vaccine  Aged Out    Meningococcal (ACWY) vaccine  Aged Out             (applicable per patient's age: Cancer Screenings, Depression Screening, Fall Risk Screening, Immunizations)    Hemoglobin A1C (%)   Date Value   12/29/2020 6.9 (H)   08/17/2020 7.4 (H)   01/31/2020 7.2 (H)     Microalb/Crt.  Ratio (mcg/mg creat)   Date Value   08/18/2020 CANNOT BE CALCULATED     LDL Cholesterol (mg/dL)   Date Value   12/29/2020 69     LDL Calculated (mg/dL)   Date Value   07/29/2013 82     AST (U/L)   Date Value   12/29/2020 14     ALT (U/L)   Date Value   12/29/2020 12     BUN (mg/dL)   Date Value   12/29/2020 16      (goal A1C is < 7)   (goal LDL is <100) need 30-50% reduction from baseline     BP Readings from Last 3 Encounters:   12/30/20 126/72   08/19/20 124/84   06/22/20 116/82    (goal /80)      All Future Testing planned in [facility]:  Lab Frequency Next Occurrence       Next Visit Date:  Future Appointments   Date Time Provider Eva Dent   3/29/2021  3:40 PM MD Shukri Theodore [facility] [address] [facility]            Patient Active Problem List:     Restless leg syndrome     KRISHNA on CPAP     Diabetes mellitus type 2 with complications ([address]     Hyperlipidemia with target LDL less than 100     Asthma     Allergic rhinitis     Essential hypertension     Major depression     Anxiety     Spondylisthesis     S/P lumbar fusion     Gastroesophageal reflux disease     Diabetic nephropathy associated with type 2 diabetes mellitus (HCC)     Moderate single current episode of major depressive disorder (HCC)     Irritable bowel syndrome with diarrhea     Morbid obesity with BMI of 40.0-44.9, adult (HCC)     Chronic kidney disease, stage III (moderate)     Diabetic polyneuropathy associated with type 2 diabetes mellitus (HCC)     B12 deficiency     Central sleep apnea     GERD (gastroesophageal reflux disease)     Dysphagia     Anemia due to stage 3 chronic kidney disease

## 2021-02-05 DIAGNOSIS — I10 HYPERTENSION, UNSPECIFIED TYPE: ICD-10-CM

## 2021-02-05 RX ORDER — LOSARTAN POTASSIUM 100 MG/1
100 TABLET ORAL DAILY
Qty: 90 TABLET | Refills: 1 | Status: SHIPPED | OUTPATIENT
Start: 2021-02-05 | End: 2021-09-02

## 2021-02-05 NOTE — TELEPHONE ENCOUNTER
LOV 12/30/20  LRF 10/1/21  RTO 3 months, Scheduled    Health Maintenance   Topic Date Due    COVID-19 Vaccine (1 of 2) 02/03/1964    Shingles Vaccine (2 of 3) 09/28/2012    DTaP/Tdap/Td vaccine (1 - Tdap) 02/14/2021 (Originally 2/3/1969)    Annual Wellness Visit (AWV)  06/23/2021    Diabetic retinal exam  10/29/2021    Breast cancer screen  12/02/2021    A1C test (Diabetic or Prediabetic)  12/29/2021    Lipid screen  12/29/2021    Potassium monitoring  12/29/2021    Creatinine monitoring  12/29/2021    Diabetic foot exam  12/30/2021    Colon cancer screen colonoscopy  01/23/2024    Flu vaccine  Completed    Pneumococcal 65+ years Vaccine  Completed    Hepatitis C screen  Completed    DEXA (modify frequency per FRAX score)  Addressed    Hepatitis A vaccine  Aged Out    Hib vaccine  Aged Out    Meningococcal (ACWY) vaccine  Aged Out             (applicable per patient's age: Cancer Screenings, Depression Screening, Fall Risk Screening, Immunizations)    Hemoglobin A1C (%)   Date Value   12/29/2020 6.9 (H)   08/17/2020 7.4 (H)   01/31/2020 7.2 (H)     Microalb/Crt.  Ratio (mcg/mg creat)   Date Value   08/18/2020 CANNOT BE CALCULATED     LDL Cholesterol (mg/dL)   Date Value   12/29/2020 69     LDL Calculated (mg/dL)   Date Value   07/29/2013 82     AST (U/L)   Date Value   12/29/2020 14     ALT (U/L)   Date Value   12/29/2020 12     BUN (mg/dL)   Date Value   12/29/2020 16      (goal A1C is < 7)   (goal LDL is <100) need 30-50% reduction from baseline     BP Readings from Last 3 Encounters:   12/30/20 126/72   08/19/20 124/84   06/22/20 116/82    (goal /80)      All Future Testing planned in [facility]:  Lab Frequency Next Occurrence       Next Visit Date:  Future Appointments   Date Time Provider Eva Dent   3/29/2021  3:40 PM MD Denise Stcaympf [facility]            Patient Active Problem List:     Restless leg syndrome     KRISHNA on CPAP     Diabetes mellitus type 2 with complications ([facility]     Hyperlipidemia with target LDL less than 100     Asthma     Allergic rhinitis     Essential hypertension     Major depression     Anxiety     Spondylisthesis     S/P lumbar fusion     Gastroesophageal reflux disease     Diabetic nephropathy associated with type 2 diabetes mellitus (HCC)     Moderate single current episode of major depressive disorder (HCC)     Irritable bowel syndrome with diarrhea     Morbid obesity with BMI of 40.0-44.9, adult (HCC)     Chronic kidney disease, stage III (moderate)     Diabetic polyneuropathy associated with type 2 diabetes mellitus (HCC)     B12 deficiency     Central sleep apnea     GERD (gastroesophageal reflux disease)     Dysphagia     Anemia due to stage 3 chronic kidney disease  ,

## 2021-02-24 DIAGNOSIS — B37.2 YEAST DERMATITIS: ICD-10-CM

## 2021-02-24 RX ORDER — NYSTATIN 100000 U/G
OINTMENT TOPICAL
Qty: 90 G | Refills: 0 | Status: SHIPPED | OUTPATIENT
Start: 2021-02-24 | End: 2021-03-26 | Stop reason: SDUPTHER

## 2021-02-24 NOTE — TELEPHONE ENCOUNTER
Last visit: 12/30/20  Last Med refill: 1/21/21  Does patient have enough medication for 72 hours: Yes    Next Visit Date:  Future Appointments   Date Time Provider Eva Dent   3/29/2021  3:40 PM Romana Rumple, MD [address] Maintenance   Topic Date Due    COVID-19 Vaccine (1 of 2) 02[birth year]/1964    DTaP/Tdap/Td vaccine (1 - Tdap) 02/03/1967    Shingles Vaccine (2 of 3) 09/28/2012    Annual Wellness Visit (AWV)  06/23/2021    Diabetic retinal exam  10/29/2021    Breast cancer screen  12/02/2021    A1C test (Diabetic or Prediabetic)  12/29/2021    Lipid screen  12/29/2021    Potassium monitoring  12/29/2021    Creatinine monitoring  12/29/2021    Diabetic foot exam  12/30/2021    Colon cancer screen colonoscopy  01/23/2024    Flu vaccine  Completed    Pneumococcal 65+ years Vaccine  Completed    Hepatitis C screen  Completed    DEXA (modify frequency per FRAX score)  Addressed    Hepatitis A vaccine  Aged Out    Hib vaccine  Aged Out    Meningococcal (ACWY) vaccine  Aged Out       Hemoglobin A1C (%)   Date Value   12/29/2020 6.9 (H)   08/17/2020 7.4 (H)   01/31/2020 7.2 (H)             ( goal A1C is < 7)   Microalb/Crt.  Ratio (mcg/mg creat)   Date Value   08/18/2020 CANNOT BE CALCULATED     LDL Cholesterol (mg/dL)   Date Value   12/29/2020 69   01/31/2020 69     LDL Calculated (mg/dL)   Date Value   07/29/2013 82       (goal LDL is <100)   AST (U/L)   Date Value   12/29/2020 14     ALT (U/L)   Date Value   12/29/2020 12     BUN (mg/dL)   Date Value   12/29/2020 16     BP Readings from Last 3 Encounters:   12/30/20 126/72   08/19/20 124/84   06/22/20 116/82          (goal 120/80)    All Future Testing planned in [facility]  Lab Frequency Next Occurrence               Patient Active Problem List:     Restless leg syndrome     KRISHNA on CPAP     Diabetes mellitus type 2 with complications (HCC)     Hyperlipidemia with target LDL less than 100     Asthma     Allergic rhinitis     Essential hypertension     Major depression     Anxiety     Spondylisthesis     S/P lumbar fusion     Gastroesophageal reflux disease     Diabetic nephropathy associated with type 2 diabetes mellitus (HCC)     Moderate single current episode of major depressive disorder ([address]     Irritable bowel syndrome with diarrhea     Morbid obesity with BMI of 40.0-44.9, adult (HCC)     Chronic kidney disease, stage III (moderate)     Diabetic polyneuropathy associated with type 2 diabetes mellitus (HCC)     B12 deficiency     Central sleep apnea     GERD (gastroesophageal reflux disease)     Dysphagia     Anemia due to stage 3 chronic kidney disease

## 2021-03-15 DIAGNOSIS — E53.8 B12 DEFICIENCY: ICD-10-CM

## 2021-03-15 DIAGNOSIS — R79.89 ABNORMAL THYROID BLOOD TEST: ICD-10-CM

## 2021-03-15 DIAGNOSIS — I10 ESSENTIAL HYPERTENSION: Primary | ICD-10-CM

## 2021-03-15 DIAGNOSIS — J45.20 MILD INTERMITTENT ASTHMA WITHOUT COMPLICATION: ICD-10-CM

## 2021-03-15 DIAGNOSIS — D63.1 ANEMIA DUE TO STAGE 3 CHRONIC KIDNEY DISEASE, UNSPECIFIED WHETHER STAGE 3A OR 3B CKD (HCC): ICD-10-CM

## 2021-03-15 DIAGNOSIS — K21.9 GASTROESOPHAGEAL REFLUX DISEASE WITHOUT ESOPHAGITIS: ICD-10-CM

## 2021-03-15 DIAGNOSIS — N18.30 ANEMIA DUE TO STAGE 3 CHRONIC KIDNEY DISEASE, UNSPECIFIED WHETHER STAGE 3A OR 3B CKD (HCC): ICD-10-CM

## 2021-03-15 DIAGNOSIS — E78.5 HYPERLIPIDEMIA, UNSPECIFIED HYPERLIPIDEMIA TYPE: ICD-10-CM

## 2021-03-15 DIAGNOSIS — E11.8 DIABETES MELLITUS TYPE 2 WITH COMPLICATIONS (HCC): ICD-10-CM

## 2021-03-26 ENCOUNTER — OFFICE VISIT (OUTPATIENT)
Dept: FAMILY MEDICINE CLINIC | Age: 71
End: 2021-03-26
Payer: MEDICARE

## 2021-03-26 VITALS
WEIGHT: 223.4 LBS | TEMPERATURE: 97.2 F | SYSTOLIC BLOOD PRESSURE: 116 MMHG | RESPIRATION RATE: 17 BRPM | BODY MASS INDEX: 39.57 KG/M2 | DIASTOLIC BLOOD PRESSURE: 76 MMHG | HEART RATE: 63 BPM

## 2021-03-26 DIAGNOSIS — E11.21 DIABETIC NEPHROPATHY ASSOCIATED WITH TYPE 2 DIABETES MELLITUS (HCC): ICD-10-CM

## 2021-03-26 DIAGNOSIS — I10 ESSENTIAL HYPERTENSION: Primary | ICD-10-CM

## 2021-03-26 DIAGNOSIS — K21.9 GASTROESOPHAGEAL REFLUX DISEASE WITHOUT ESOPHAGITIS: ICD-10-CM

## 2021-03-26 DIAGNOSIS — B37.2 YEAST DERMATITIS: ICD-10-CM

## 2021-03-26 DIAGNOSIS — G25.81 RESTLESS LEG SYNDROME: ICD-10-CM

## 2021-03-26 DIAGNOSIS — Z98.1 S/P LUMBAR FUSION: ICD-10-CM

## 2021-03-26 DIAGNOSIS — M25.512 CHRONIC PAIN OF BOTH SHOULDERS: ICD-10-CM

## 2021-03-26 DIAGNOSIS — F41.9 ANXIETY: ICD-10-CM

## 2021-03-26 DIAGNOSIS — E78.5 HYPERLIPIDEMIA, UNSPECIFIED HYPERLIPIDEMIA TYPE: ICD-10-CM

## 2021-03-26 DIAGNOSIS — M25.551 RIGHT HIP PAIN: ICD-10-CM

## 2021-03-26 DIAGNOSIS — M15.9 GENERALIZED OSTEOARTHRITIS: ICD-10-CM

## 2021-03-26 DIAGNOSIS — G89.29 CHRONIC PAIN OF BOTH SHOULDERS: ICD-10-CM

## 2021-03-26 DIAGNOSIS — M25.511 CHRONIC PAIN OF BOTH SHOULDERS: ICD-10-CM

## 2021-03-26 DIAGNOSIS — D63.1 ANEMIA DUE TO STAGE 3 CHRONIC KIDNEY DISEASE, UNSPECIFIED WHETHER STAGE 3A OR 3B CKD (HCC): ICD-10-CM

## 2021-03-26 DIAGNOSIS — G47.31 CENTRAL SLEEP APNEA: ICD-10-CM

## 2021-03-26 DIAGNOSIS — R79.89 ABNORMAL THYROID BLOOD TEST: ICD-10-CM

## 2021-03-26 DIAGNOSIS — E53.8 B12 DEFICIENCY: ICD-10-CM

## 2021-03-26 DIAGNOSIS — Z13.820 SCREENING FOR OSTEOPOROSIS: ICD-10-CM

## 2021-03-26 DIAGNOSIS — M17.12 PRIMARY OSTEOARTHRITIS OF LEFT KNEE: ICD-10-CM

## 2021-03-26 DIAGNOSIS — F32.1 MODERATE SINGLE CURRENT EPISODE OF MAJOR DEPRESSIVE DISORDER (HCC): ICD-10-CM

## 2021-03-26 DIAGNOSIS — E11.8 DIABETES MELLITUS TYPE 2 WITH COMPLICATIONS (HCC): ICD-10-CM

## 2021-03-26 DIAGNOSIS — N18.30 ANEMIA DUE TO STAGE 3 CHRONIC KIDNEY DISEASE, UNSPECIFIED WHETHER STAGE 3A OR 3B CKD (HCC): ICD-10-CM

## 2021-03-26 DIAGNOSIS — E11.42 DIABETIC POLYNEUROPATHY ASSOCIATED WITH TYPE 2 DIABETES MELLITUS (HCC): ICD-10-CM

## 2021-03-26 DIAGNOSIS — J45.20 MILD INTERMITTENT ASTHMA WITHOUT COMPLICATION: ICD-10-CM

## 2021-03-26 DIAGNOSIS — J30.1 CHRONIC SEASONAL ALLERGIC RHINITIS DUE TO POLLEN: ICD-10-CM

## 2021-03-26 DIAGNOSIS — N18.30 STAGE 3 CHRONIC KIDNEY DISEASE, UNSPECIFIED WHETHER STAGE 3A OR 3B CKD (HCC): ICD-10-CM

## 2021-03-26 DIAGNOSIS — G47.33 OSA (OBSTRUCTIVE SLEEP APNEA): ICD-10-CM

## 2021-03-26 DIAGNOSIS — Z78.0 POST-MENOPAUSAL: ICD-10-CM

## 2021-03-26 PROCEDURE — 99214 OFFICE O/P EST MOD 30 MIN: CPT | Performed by: PEDIATRICS

## 2021-03-26 PROCEDURE — 3017F COLORECTAL CA SCREEN DOC REV: CPT | Performed by: PEDIATRICS

## 2021-03-26 PROCEDURE — 1090F PRES/ABSN URINE INCON ASSESS: CPT | Performed by: PEDIATRICS

## 2021-03-26 PROCEDURE — G8427 DOCREV CUR MEDS BY ELIG CLIN: HCPCS | Performed by: PEDIATRICS

## 2021-03-26 PROCEDURE — 0518F FALL PLAN OF CARE DOCD: CPT | Performed by: PEDIATRICS

## 2021-03-26 PROCEDURE — 3288F FALL RISK ASSESSMENT DOCD: CPT | Performed by: PEDIATRICS

## 2021-03-26 PROCEDURE — G8417 CALC BMI ABV UP PARAM F/U: HCPCS | Performed by: PEDIATRICS

## 2021-03-26 PROCEDURE — 1123F ACP DISCUSS/DSCN MKR DOCD: CPT | Performed by: PEDIATRICS

## 2021-03-26 PROCEDURE — G8399 PT W/DXA RESULTS DOCUMENT: HCPCS | Performed by: PEDIATRICS

## 2021-03-26 PROCEDURE — 4040F PNEUMOC VAC/ADMIN/RCVD: CPT | Performed by: PEDIATRICS

## 2021-03-26 PROCEDURE — 3046F HEMOGLOBIN A1C LEVEL >9.0%: CPT | Performed by: PEDIATRICS

## 2021-03-26 PROCEDURE — 2022F DILAT RTA XM EVC RTNOPTHY: CPT | Performed by: PEDIATRICS

## 2021-03-26 PROCEDURE — 1036F TOBACCO NON-USER: CPT | Performed by: PEDIATRICS

## 2021-03-26 PROCEDURE — G8484 FLU IMMUNIZE NO ADMIN: HCPCS | Performed by: PEDIATRICS

## 2021-03-26 RX ORDER — NYSTATIN 100000 U/G
OINTMENT TOPICAL
Qty: 90 G | Refills: 0 | Status: SHIPPED | OUTPATIENT
Start: 2021-03-26 | End: 2021-07-21

## 2021-03-26 ASSESSMENT — ENCOUNTER SYMPTOMS
EYE DISCHARGE: 0
EYE REDNESS: 0
CHEST TIGHTNESS: 0
SORE THROAT: 0
COUGH: 0
ABDOMINAL PAIN: 0
SHORTNESS OF BREATH: 0
TROUBLE SWALLOWING: 0
WHEEZING: 0
EYE PAIN: 0
BLOOD IN STOOL: 0
NAUSEA: 0
VOMITING: 0
CONSTIPATION: 0
CHOKING: 0
RHINORRHEA: 0
STRIDOR: 0
COLOR CHANGE: 0
SINUS PRESSURE: 0
BACK PAIN: 1
DIARRHEA: 1

## 2021-03-26 NOTE — PROGRESS NOTES
Dar Cadet (:  1950) is a 66 y.o. female,Established patient, here for evaluation of the following chief complaint(s):  Hypertension      ASSESSMENT/PLAN:    1. Essential hypertension  2. Hyperlipidemia, unspecified hyperlipidemia type  3. Mild intermittent asthma without complication  4. Chronic seasonal allergic rhinitis due to pollen  5. KRISHNA (obstructive sleep apnea)  6. Central sleep apnea  7. Restless leg syndrome  8. Gastroesophageal reflux disease without esophagitis  9. Moderate single current episode of major depressive disorder ([facility]  10. Anxiety  11. S/P lumbar fusion  -     [facility]; Starting Fri 3/26/2021, Disp-1 each, R-0, PrintExpires 3/26/2026  12. Diabetes mellitus type 2 with complications ([facility]  13. Diabetic nephropathy associated with type 2 diabetes mellitus ([facility]  14. Stage 3 chronic kidney disease, unspecified whether stage 3a or 3b CKD  15. Diabetic polyneuropathy associated with type 2 diabetes mellitus ([facility]  16. Generalized osteoarthritis  -     [facility]; Starting Fri 3/26/2021, Disp-1 each, R-0, PrintExpires 3/26/2026  17. Chronic pain of both shoulders  18. Right hip pain  19. Primary osteoarthritis of left knee  -     [facility]; Starting Fri 3/26/2021, Disp-1 each, R-0, PrintExpires 3/26/2026  20. B12 deficiency  21. Abnormal thyroid blood test  22. Anemia due to stage 3 chronic kidney disease, unspecified whether stage 3a or 3b CKD  23. Yeast dermatitis  -     nystatin (MYCOSTATIN) 774324 UNIT/GM ointment; Apply twice daily topically, Disp-90 g, R-0, Normal  24. Post-menopausal  -     DEXA BONE DENSITY 2 SITES; Future  25. Screening for osteoporosis  -     DEXA BONE DENSITY 2 SITES;  Future      Proceed with obtain labs as ordered  Continue current medications  Follow-up with pulmonary  Consider follow-up with GI after review of blood counts / obtain FIT test  Continue Prozac  Follow-up with endocrinology as scheduled  Yearly eye more happy. She also underwent a posterior lumbar interbody fusion of L4/L5 due to spondylolisthesis in 2016. She does have low back pain intermittently. She is currently taking Percocet only as needed and Neurontin and tizanidine twice daily. She does feel little shaky at times and I told her that this may be some side effects from the Neurontin. She does have a history of diabetes type 2 with nephropathy, CKD and neuropathy is followed by her new endocrinologist, Dr. Brittany Roy. She takes insulin 70/30 and continues to have low sugars - endo lowered her insulin to 12 units once daily. She is still taking her glimeperide and metformin.   She did have her eye exam with Dr Carlota Delgado. Evangelist Vega last HgA1c was 6.9.  She does have CKD with a baseline creatinine of 1.3 for some time. Michaela Cardoza has seen Dr. Augustin Cruz in the past but this has been a while. She would prefer not to see nephrology and last her creatinine has actually improved with her most recent labs likely because she is drinking more water.    She also has left knee pain secondary to OA. She was seeing Dr. Cora Raymond for her severe OA of the knee and will need to get back in to see him.  Her left shoulder has arthritis and bothers her too.  Her right shoulder has been bothering and she has limited range of motion of this too. It is difficult to move her arms above her head. Her right hip has been bothering her. She does have a B12 deficiency and she has B12 injections in the past.  She has been taking an over-the-counter B complex vitamin. She does have a history of obesity and she is trying to work on this.    She has a history of abnormal thyroid labs and a feeling of something in her throat.  She did have a thyroid ultrasound that showed her that her thyroid gland was mildly heterogeneous and diminutive.  She did see her endocrinologist and they did advise her that her thyroid was not that bad and advised her to continue to monitor labs.   She does have a history of macules over the arms and back - c/w neurodermatitis / occasional rash under the breasts ). Negative for color change and wound. Allergic/Immunologic: Negative for immunocompromised state. Neurological: Positive for tremors (shaky at times), weakness and numbness. Negative for dizziness, light-headedness and headaches. Hematological: Negative for adenopathy. Does not bruise/bleed easily. History of mild anemia   Psychiatric/Behavioral: Positive for dysphoric mood (stable with prozac) and sleep disturbance (stable with trazodone). Negative for agitation, confusion and decreased concentration. The patient is nervous/anxious (stable). Physical Exam  Vitals signs and nursing note reviewed. Constitutional:       General: She is not in acute distress. Appearance: Normal appearance. She is well-developed. She is obese. She is not ill-appearing, toxic-appearing or diaphoretic. HENT:      Head: Normocephalic. Right Ear: Tympanic membrane, ear canal and external ear normal. There is no impacted cerumen. Left Ear: Tympanic membrane, ear canal and external ear normal. There is no impacted cerumen. Nose: Nose normal. No congestion or rhinorrhea. Mouth/Throat:      Mouth: Mucous membranes are moist.      Pharynx: No oropharyngeal exudate. Eyes:      General: No scleral icterus. Right eye: No discharge. Left eye: No discharge. Extraocular Movements: Extraocular movements intact. Conjunctiva/sclera: Conjunctivae normal.      Pupils: Pupils are equal, round, and reactive to light. Neck:      Musculoskeletal: Normal range of motion and neck supple. Thyroid: No thyromegaly. Vascular: No JVD. Cardiovascular:      Rate and Rhythm: Normal rate and regular rhythm. Pulses: Normal pulses. Heart sounds: Murmur present. Pulmonary:      Effort: Pulmonary effort is normal.      Breath sounds: Normal breath sounds. No stridor.  No wheezing or rales.   Abdominal:      General: Bowel sounds are normal.      Palpations: Abdomen is soft. There is no mass. Tenderness: There is no abdominal tenderness. There is no right CVA tenderness or left CVA tenderness. Musculoskeletal:      Right shoulder: She exhibits decreased range of motion and tenderness. Left shoulder: She exhibits decreased range of motion and tenderness. Right hip: She exhibits tenderness. She exhibits normal strength. Left hip: She exhibits tenderness. She exhibits normal strength. Right knee: Normal.      Left knee: She exhibits decreased range of motion. Tenderness found. Medial joint line tenderness noted. Lumbar back: She exhibits decreased range of motion and tenderness. Back:       Right lower leg: No edema. Left lower leg: No edema. Feet:      Right foot:      Protective Sensation: 6 sites tested. 6 sites sensed. Left foot:      Protective Sensation: 6 sites tested. 6 sites sensed. Comments: Sensation is decreased over the feet - all areas sensed but with mild decreased sensation  Lymphadenopathy:      Cervical: No cervical adenopathy. Skin:     General: Skin is warm. Capillary Refill: Capillary refill takes less than 2 seconds. Findings: Rash (Multiple excoriated macules over the legs and breasts / chest) present. Neurological:      General: No focal deficit present. Mental Status: She is alert and oriented to person, place, and time. Cranial Nerves: No cranial nerve deficit. Motor: No abnormal muscle tone. Coordination: Coordination normal.      Deep Tendon Reflexes: Reflexes normal.   Psychiatric:         Attention and Perception: Attention normal.         Mood and Affect: Mood normal. Mood is not anxious or depressed. Speech: Speech normal.         Behavior: Behavior normal.         Thought Content:  Thought content normal.         Judgment: Judgment normal.         An electronic signature was used to authenticate this note.     --Martha Phillips MD

## 2021-05-10 DIAGNOSIS — M43.10 SPONDYLOLISTHESIS, UNSPECIFIED SPINAL REGION: ICD-10-CM

## 2021-05-11 RX ORDER — TIZANIDINE 2 MG/1
TABLET ORAL
Qty: 270 TABLET | Refills: 1 | Status: SHIPPED | OUTPATIENT
Start: 2021-05-11 | End: 2021-10-20

## 2021-05-11 NOTE — TELEPHONE ENCOUNTER
Last visit: 3/26/21  Last Med refill: 10/8/20    Next Visit Date:  Future Appointments   Date Time Provider Eva Jailene   7/28/2021  3:00 PM [facility] [address] Maintenance   Topic Date Due    DTaP/Tdap/Td vaccine (1 - Tdap) Never done    Shingles Vaccine (2 of 3) 09/28/2012    COVID-19 Vaccine (2 - Moderna 2-dose series) 04/14/2021    Annual Wellness Visit (AWV)  06/23/2021    Diabetic retinal exam  10/29/2021    Breast cancer screen  12/02/2021    A1C test (Diabetic or Prediabetic)  12/29/2021    Lipid screen  12/29/2021    Potassium monitoring  12/29/2021    Creatinine monitoring  12/29/2021    Diabetic foot exam  12/30/2021    Colon cancer screen colonoscopy  01/23/2024    Flu vaccine  Completed    Pneumococcal 65+ years Vaccine  Completed    Hepatitis C screen  Completed    DEXA (modify frequency per FRAX score)  Addressed    Hepatitis A vaccine  Aged Out    Hib vaccine  Aged Out    Meningococcal (ACWY) vaccine  Aged Out       Hemoglobin A1C (%)   Date Value   12/29/2020 6.9 (H)   08/17/2020 7.4 (H)   01/31/2020 7.2 (H)             ( goal A1C is < 7)   Microalb/Crt.  Ratio (mcg/mg creat)   Date Value   08/18/2020 CANNOT BE CALCULATED     LDL Cholesterol (mg/dL)   Date Value   12/29/2020 69   01/31/2020 69     LDL Calculated (mg/dL)   Date Value   07/29/2013 82       (goal LDL is <100)   AST (U/L)   Date Value   12/29/2020 14     ALT (U/L)   Date Value   12/29/2020 12     BUN (mg/dL)   Date Value   12/29/2020 16     BP Readings from Last 3 Encounters:   03/26/21 116/76   12/30/20 126/72   08/19/20 124/84          (goal 120/80)    All Future Testing planned in [facility]  Lab Frequency Next Occurrence   DEXA BONE DENSITY 2 SITES Once 03/26/2021               Patient Active Problem List:     Restless leg syndrome     KRISHNA on CPAP     Diabetes mellitus type 2 with complications (HCC)     Hyperlipidemia with target LDL less than 100     Asthma Allergic rhinitis     Essential hypertension     Major depression     Anxiety     Spondylisthesis     S/P lumbar fusion     Gastroesophageal reflux disease     Diabetic nephropathy associated with type 2 diabetes mellitus (HCC)     Moderate single current episode of major depressive disorder ([facility] [address]     Irritable bowel syndrome with diarrhea     Morbid obesity with BMI of 40.0-44.9, adult (HCC)     Chronic kidney disease, stage III (moderate)     Diabetic polyneuropathy associated with type 2 diabetes mellitus (HCC)     B12 deficiency     Central sleep apnea     GERD (gastroesophageal reflux disease)     Dysphagia     Anemia due to stage 3 chronic kidney disease

## 2021-07-01 ENCOUNTER — HOSPITAL ENCOUNTER (OUTPATIENT)
Age: 71
Setting detail: SPECIMEN
Discharge: HOME | End: 2021-07-01
Payer: MEDICARE

## 2021-07-02 LAB
ALBUMIN SERPL-MCNC: 3.7 G/DL (ref 3.5–5.2)
ALBUMIN SERPL-MCNC: 3.7 G/DL (ref 3.5–5.2)
ALBUMIN/GLOBULIN RATIO: 1.5 (ref 1–2.5)
ALBUMIN/GLOBULIN RATIO: 1.5 (ref 1–2.5)
ALP BLD-CCNC: 160 U/L (ref 35–104)
ALP BLD-CCNC: 160 U/L (ref 35–104)
ALT SERPL-CCNC: 19 U/L (ref 5–33)
ALT SERPL-CCNC: 19 U/L (ref 5–33)
ANION GAP SERPL CALCULATED.3IONS-SCNC: 11 MMOL/L (ref 9–17)
ANION GAP SERPL CALCULATED.3IONS-SCNC: 11 MMOL/L (ref 9–17)
AST SERPL-CCNC: 20 U/L
AST SERPL-CCNC: 20 U/L
BILIRUB SERPL-MCNC: 0.28 MG/DL (ref 0.3–1.2)
BILIRUB SERPL-MCNC: 0.28 MG/DL (ref 0.3–1.2)
BUN BLDV-MCNC: 21 MG/DL (ref 8–23)
BUN BLDV-MCNC: 21 MG/DL (ref 8–23)
BUN/CREAT BLD: ABNORMAL (ref 9–20)
BUN/CREAT BLD: ABNORMAL (ref 9–20)
CALCIUM SERPL-MCNC: 9.1 MG/DL (ref 8.6–10.4)
CALCIUM SERPL-MCNC: 9.1 MG/DL (ref 8.6–10.4)
CHLORIDE BLD-SCNC: 106 MMOL/L (ref 98–107)
CHLORIDE BLD-SCNC: 106 MMOL/L (ref 98–107)
CHOLESTEROL, FASTING: 122 MG/DL
CHOLESTEROL/HDL RATIO: 2.8
CO2: 23 MMOL/L (ref 20–31)
CO2: 23 MMOL/L (ref 20–31)
CREAT SERPL-MCNC: 1.31 MG/DL (ref 0.5–0.9)
CREAT SERPL-MCNC: 1.31 MG/DL (ref 0.5–0.9)
CREATININE URINE: 70.6 MG/DL (ref 28–217)
ESTIMATED AVERAGE GLUCOSE: 157 MG/DL
FERRITIN: 23 UG/L (ref 13–150)
GFR AFRICAN AMERICAN: 48 ML/MIN
GFR AFRICAN AMERICAN: 48 ML/MIN
GFR NON-AFRICAN AMERICAN: 40 ML/MIN
GFR NON-AFRICAN AMERICAN: 40 ML/MIN
GFR SERPL CREATININE-BSD FRML MDRD: ABNORMAL ML/MIN/{1.73_M2}
GLUCOSE BLD-MCNC: 124 MG/DL (ref 70–99)
GLUCOSE BLD-MCNC: 124 MG/DL (ref 70–99)
HBA1C MFR BLD: 7.1 % (ref 4–6)
HCT VFR BLD CALC: 32.3 % (ref 36.3–47.1)
HDLC SERPL-MCNC: 43 MG/DL
HEMOGLOBIN: 9.3 G/DL (ref 11.9–15.1)
IRON SATURATION: 19 % (ref 20–55)
IRON: 58 UG/DL (ref 37–145)
LDL CHOLESTEROL: 63 MG/DL (ref 0–130)
MCH RBC QN AUTO: 26.5 PG (ref 25.2–33.5)
MCHC RBC AUTO-ENTMCNC: 28.8 G/DL (ref 28.4–34.8)
MCV RBC AUTO: 92 FL (ref 82.6–102.9)
MICROALBUMIN/CREAT 24H UR: <12 MG/L
MICROALBUMIN/CREAT UR-RTO: NORMAL MCG/MG CREAT
NRBC AUTOMATED: 0 PER 100 WBC
PDW BLD-RTO: 15.9 % (ref 11.8–14.4)
PLATELET # BLD: 243 K/UL (ref 138–453)
PMV BLD AUTO: 10.2 FL (ref 8.1–13.5)
POTASSIUM SERPL-SCNC: 5.4 MMOL/L (ref 3.7–5.3)
POTASSIUM SERPL-SCNC: 5.4 MMOL/L (ref 3.7–5.3)
RBC # BLD: 3.51 M/UL (ref 3.95–5.11)
SODIUM BLD-SCNC: 140 MMOL/L (ref 135–144)
SODIUM BLD-SCNC: 140 MMOL/L (ref 135–144)
THYROXINE, FREE: 0.88 NG/DL (ref 0.93–1.7)
TOTAL IRON BINDING CAPACITY: 311 UG/DL (ref 250–450)
TOTAL PROTEIN: 6.2 G/DL (ref 6.4–8.3)
TOTAL PROTEIN: 6.2 G/DL (ref 6.4–8.3)
TRIGLYCERIDE, FASTING: 81 MG/DL
TSH SERPL DL<=0.05 MIU/L-ACNC: 3.17 MIU/L (ref 0.3–5)
UNSATURATED IRON BINDING CAPACITY: 253 UG/DL (ref 112–347)
VITAMIN B-12: 718 PG/ML (ref 232–1245)
VITAMIN B-12: 718 PG/ML (ref 232–1245)
VLDLC SERPL CALC-MCNC: NORMAL MG/DL (ref 1–30)
WBC # BLD: 9.4 K/UL (ref 3.5–11.3)

## 2021-07-09 ENCOUNTER — TELEPHONE (OUTPATIENT)
Dept: FAMILY MEDICINE CLINIC | Age: 71
End: 2021-07-09

## 2021-07-09 DIAGNOSIS — I10 ESSENTIAL HYPERTENSION: Primary | ICD-10-CM

## 2021-07-09 DIAGNOSIS — E87.6 HYPOKALEMIA: ICD-10-CM

## 2021-07-09 DIAGNOSIS — E53.8 B12 DEFICIENCY: ICD-10-CM

## 2021-07-09 DIAGNOSIS — D64.9 ANEMIA, UNSPECIFIED TYPE: ICD-10-CM

## 2021-07-09 DIAGNOSIS — E11.8 DIABETES MELLITUS TYPE 2 WITH COMPLICATIONS (HCC): ICD-10-CM

## 2021-07-09 DIAGNOSIS — E78.5 HYPERLIPIDEMIA WITH TARGET LDL LESS THAN 100: ICD-10-CM

## 2021-07-14 ENCOUNTER — TELEPHONE (OUTPATIENT)
Dept: FAMILY MEDICINE CLINIC | Age: 71
End: 2021-07-14

## 2021-07-14 DIAGNOSIS — I10 ESSENTIAL HYPERTENSION: ICD-10-CM

## 2021-07-14 DIAGNOSIS — E11.8 DIABETES MELLITUS TYPE 2 WITH COMPLICATIONS (HCC): ICD-10-CM

## 2021-07-14 DIAGNOSIS — D63.1 ANEMIA DUE TO STAGE 3 CHRONIC KIDNEY DISEASE, UNSPECIFIED WHETHER STAGE 3A OR 3B CKD (HCC): ICD-10-CM

## 2021-07-14 DIAGNOSIS — D64.9 ANEMIA, UNSPECIFIED TYPE: ICD-10-CM

## 2021-07-14 DIAGNOSIS — E53.8 B12 DEFICIENCY: Primary | ICD-10-CM

## 2021-07-14 DIAGNOSIS — N18.30 ANEMIA DUE TO STAGE 3 CHRONIC KIDNEY DISEASE, UNSPECIFIED WHETHER STAGE 3A OR 3B CKD (HCC): ICD-10-CM

## 2021-07-14 NOTE — TELEPHONE ENCOUNTER
Labs pended for editing and approval per result notes. Referrals pended for editing and approval. Patient is aware of all orders.

## 2021-07-19 ENCOUNTER — TELEPHONE (OUTPATIENT)
Dept: ONCOLOGY | Age: 71
End: 2021-07-19

## 2021-07-20 ENCOUNTER — TELEPHONE (OUTPATIENT)
Dept: PRIMARY CARE CLINIC | Age: 71
End: 2021-07-20

## 2021-07-20 LAB
CONTROL: PRESENT
HEMOCCULT STL QL: NEGATIVE

## 2021-07-20 NOTE — TELEPHONE ENCOUNTER
Pt dropped off FIT test today in the office- I am unsure as to how to document the results as the order is still pending. Fit test: Negative. Please sign orders or disregard. Thank you.

## 2021-07-21 DIAGNOSIS — E78.5 HYPERLIPIDEMIA WITH TARGET LDL LESS THAN 100: ICD-10-CM

## 2021-07-21 DIAGNOSIS — I10 ESSENTIAL HYPERTENSION: ICD-10-CM

## 2021-07-21 DIAGNOSIS — E11.8 DIABETES MELLITUS TYPE 2 WITH COMPLICATIONS (HCC): ICD-10-CM

## 2021-07-21 DIAGNOSIS — D64.9 ANEMIA, UNSPECIFIED TYPE: ICD-10-CM

## 2021-07-21 PROCEDURE — 82274 ASSAY TEST FOR BLOOD FECAL: CPT | Performed by: PEDIATRICS

## 2021-07-21 NOTE — TELEPHONE ENCOUNTER
Order is now signed. Please complete documentation for FIT test results. FIT test is negative meaning no blood in the stool. Recommend: Follow up with GI and hematology as previously instructed due to anemia.

## 2021-07-22 ENCOUNTER — TELEPHONE (OUTPATIENT)
Dept: PRIMARY CARE CLINIC | Age: 71
End: 2021-07-22

## 2021-07-22 NOTE — TELEPHONE ENCOUNTER
Patient called and is aware of her fot test results and recommendations. Has no further questions at this time.

## 2021-07-30 ENCOUNTER — TELEPHONE (OUTPATIENT)
Dept: GASTROENTEROLOGY | Age: 71
End: 2021-07-30

## 2021-07-30 RX ORDER — FLUOXETINE HYDROCHLORIDE 40 MG/1
40 CAPSULE ORAL DAILY
Qty: 90 CAPSULE | Refills: 1 | Status: SHIPPED | OUTPATIENT
Start: 2021-07-30 | End: 2021-12-20

## 2021-07-30 NOTE — TELEPHONE ENCOUNTER
LOV 3-26-21  LRF 1-14-21    Health Maintenance   Topic Date Due    DTaP/Tdap/Td vaccine (1 - Tdap) Never done    Shingles Vaccine (2 of 3) 09/28/2012    COVID-19 Vaccine (2 - Moderna 2-dose series) 04/14/2021    Flu vaccine (1) 09/01/2021    Diabetic retinal exam  10/29/2021    Breast cancer screen  12/02/2021    Diabetic foot exam  12/30/2021    A1C test (Diabetic or Prediabetic)  07/01/2022    Lipid screen  07/01/2022    Potassium monitoring  07/01/2022    Creatinine monitoring  07/01/2022    Annual Wellness Visit (AWV)  07/09/2022    Colon cancer screen colonoscopy  01/23/2024    Pneumococcal 65+ years Vaccine  Completed    Hepatitis C screen  Completed    DEXA (modify frequency per FRAX score)  Addressed    Hepatitis A vaccine  Aged Out    Hib vaccine  Aged Out    Meningococcal (ACWY) vaccine  Aged Out             (applicable per patient's age: Cancer Screenings, Depression Screening, Fall Risk Screening, Immunizations)    Hemoglobin A1C (%)   Date Value   07/01/2021 7.1 (H)   12/29/2020 6.9 (H)   08/17/2020 7.4 (H)     Microalb/Crt.  Ratio (mcg/mg creat)   Date Value   07/01/2021 CANNOT BE CALCULATED     LDL Cholesterol (mg/dL)   Date Value   07/01/2021 63     LDL Calculated (mg/dL)   Date Value   07/29/2013 82     AST (U/L)   Date Value   07/01/2021 20   07/01/2021 20     ALT (U/L)   Date Value   07/01/2021 19   07/01/2021 19     BUN (mg/dL)   Date Value   07/01/2021 21   07/01/2021 21      (goal A1C is < 7)   (goal LDL is <100) need 30-50% reduction from baseline     BP Readings from Last 3 Encounters:   03/26/21 116/76   12/30/20 126/72   08/19/20 124/84    (goal /80)      All Future Testing planned in [facility]:  Lab Frequency Next Occurrence   DEXA BONE DENSITY 2 SITES Once 03/26/2021   Potassium Once 07/23/2021   Comprehensive Metabolic Panel, Fasting Once 10/15/2021   Vitamin B12 Once 10/15/2021   CBC Once 10/15/2021   Iron And TIBC Once 10/15/2021   Ferritin Once 10/15/2021 Comprehensive Metabolic Panel, Fasting Once 10/14/2021   Vitamin B12 Once 10/14/2021   CBC With Auto Differential Once 10/14/2021   Iron And TIBC Once 10/14/2021   Ferritin Once 10/14/2021   Potassium Once 07/01/2022       Next Visit Date:  Future Appointments   Date Time Provider Eva Dent   8/20/2021  1:00 PM MD Kenji Ridley [facility] [address] [facility]            Patient Active Problem List:     Restless leg syndrome     KRISHNA on CPAP     Diabetes mellitus type 2 with complications ([address]     Hyperlipidemia with target LDL less than 100     Asthma     Allergic rhinitis     Essential hypertension     Major depression     Anxiety     Spondylisthesis     S/P lumbar fusion     Gastroesophageal reflux disease     Diabetic nephropathy associated with type 2 diabetes mellitus (HCC)     Moderate single current episode of major depressive disorder ([address]     Irritable bowel syndrome with diarrhea     Morbid obesity with BMI of 40.0-44.9, adult (HCC)     Chronic kidney disease, stage III (moderate) (HCC)     Diabetic polyneuropathy associated with type 2 diabetes mellitus (HCC)     B12 deficiency     Central sleep apnea     GERD (gastroesophageal reflux disease)     Dysphagia     Anemia due to stage 3 chronic kidney disease ([address]

## 2021-07-30 NOTE — TELEPHONE ENCOUNTER
Note: Pt scheduled 11/2020 and cancelled asking to be recalled after new year  LVM for pt to call and schedule appt from new referral received - 1st attempt  Letter sent out to callback to schedule from referral - 2nd attempt

## 2021-08-03 NOTE — TELEPHONE ENCOUNTER
#3 SPOKE TO PATIENT, PT DOES NOT WANT TO SCHEDULE AN APPT AT THIS TIME. SHE WILL CALL BACK IF DECIDES TO SCHEDULE. SENDING REFERRAL BACK PLEASE ADVISE.

## 2021-08-20 ENCOUNTER — OFFICE VISIT (OUTPATIENT)
Dept: FAMILY MEDICINE CLINIC | Age: 71
End: 2021-08-20
Payer: MEDICARE

## 2021-08-20 VITALS
TEMPERATURE: 97.4 F | HEART RATE: 66 BPM | RESPIRATION RATE: 17 BRPM | WEIGHT: 228 LBS | SYSTOLIC BLOOD PRESSURE: 122 MMHG | DIASTOLIC BLOOD PRESSURE: 80 MMHG | BODY MASS INDEX: 40.39 KG/M2

## 2021-08-20 DIAGNOSIS — G47.31 CENTRAL SLEEP APNEA: ICD-10-CM

## 2021-08-20 DIAGNOSIS — M17.12 PRIMARY OSTEOARTHRITIS OF LEFT KNEE: ICD-10-CM

## 2021-08-20 DIAGNOSIS — R79.89 ABNORMAL THYROID BLOOD TEST: ICD-10-CM

## 2021-08-20 DIAGNOSIS — E78.5 HYPERLIPIDEMIA WITH TARGET LDL LESS THAN 100: ICD-10-CM

## 2021-08-20 DIAGNOSIS — J45.20 MILD INTERMITTENT ASTHMA WITHOUT COMPLICATION: ICD-10-CM

## 2021-08-20 DIAGNOSIS — F32.1 MODERATE SINGLE CURRENT EPISODE OF MAJOR DEPRESSIVE DISORDER (HCC): ICD-10-CM

## 2021-08-20 DIAGNOSIS — F41.9 ANXIETY: ICD-10-CM

## 2021-08-20 DIAGNOSIS — K21.9 GASTROESOPHAGEAL REFLUX DISEASE WITHOUT ESOPHAGITIS: ICD-10-CM

## 2021-08-20 DIAGNOSIS — J30.1 CHRONIC SEASONAL ALLERGIC RHINITIS DUE TO POLLEN: ICD-10-CM

## 2021-08-20 DIAGNOSIS — Z98.1 S/P LUMBAR FUSION: ICD-10-CM

## 2021-08-20 DIAGNOSIS — E11.8 DIABETES MELLITUS TYPE 2 WITH COMPLICATIONS (HCC): ICD-10-CM

## 2021-08-20 DIAGNOSIS — G89.29 CHRONIC PAIN OF BOTH SHOULDERS: ICD-10-CM

## 2021-08-20 DIAGNOSIS — G47.33 OSA (OBSTRUCTIVE SLEEP APNEA): ICD-10-CM

## 2021-08-20 DIAGNOSIS — D64.9 ANEMIA, UNSPECIFIED TYPE: ICD-10-CM

## 2021-08-20 DIAGNOSIS — M25.511 CHRONIC PAIN OF BOTH SHOULDERS: ICD-10-CM

## 2021-08-20 DIAGNOSIS — I10 ESSENTIAL HYPERTENSION: Primary | ICD-10-CM

## 2021-08-20 DIAGNOSIS — G25.81 RESTLESS LEG SYNDROME: ICD-10-CM

## 2021-08-20 DIAGNOSIS — D63.1 ANEMIA DUE TO STAGE 3 CHRONIC KIDNEY DISEASE, UNSPECIFIED WHETHER STAGE 3A OR 3B CKD (HCC): ICD-10-CM

## 2021-08-20 DIAGNOSIS — Z12.31 ENCOUNTER FOR SCREENING MAMMOGRAM FOR MALIGNANT NEOPLASM OF BREAST: ICD-10-CM

## 2021-08-20 DIAGNOSIS — N18.30 ANEMIA DUE TO STAGE 3 CHRONIC KIDNEY DISEASE, UNSPECIFIED WHETHER STAGE 3A OR 3B CKD (HCC): ICD-10-CM

## 2021-08-20 DIAGNOSIS — M15.9 GENERALIZED OSTEOARTHRITIS: ICD-10-CM

## 2021-08-20 DIAGNOSIS — M25.512 CHRONIC PAIN OF BOTH SHOULDERS: ICD-10-CM

## 2021-08-20 DIAGNOSIS — N18.30 STAGE 3 CHRONIC KIDNEY DISEASE, UNSPECIFIED WHETHER STAGE 3A OR 3B CKD (HCC): ICD-10-CM

## 2021-08-20 DIAGNOSIS — E11.42 DIABETIC POLYNEUROPATHY ASSOCIATED WITH TYPE 2 DIABETES MELLITUS (HCC): ICD-10-CM

## 2021-08-20 DIAGNOSIS — M25.551 RIGHT HIP PAIN: ICD-10-CM

## 2021-08-20 DIAGNOSIS — E11.21 DIABETIC NEPHROPATHY ASSOCIATED WITH TYPE 2 DIABETES MELLITUS (HCC): ICD-10-CM

## 2021-08-20 PROCEDURE — G8399 PT W/DXA RESULTS DOCUMENT: HCPCS | Performed by: PEDIATRICS

## 2021-08-20 PROCEDURE — 1090F PRES/ABSN URINE INCON ASSESS: CPT | Performed by: PEDIATRICS

## 2021-08-20 PROCEDURE — 1123F ACP DISCUSS/DSCN MKR DOCD: CPT | Performed by: PEDIATRICS

## 2021-08-20 PROCEDURE — G8427 DOCREV CUR MEDS BY ELIG CLIN: HCPCS | Performed by: PEDIATRICS

## 2021-08-20 PROCEDURE — 4040F PNEUMOC VAC/ADMIN/RCVD: CPT | Performed by: PEDIATRICS

## 2021-08-20 PROCEDURE — 2022F DILAT RTA XM EVC RTNOPTHY: CPT | Performed by: PEDIATRICS

## 2021-08-20 PROCEDURE — 3051F HG A1C>EQUAL 7.0%<8.0%: CPT | Performed by: PEDIATRICS

## 2021-08-20 PROCEDURE — 3288F FALL RISK ASSESSMENT DOCD: CPT | Performed by: PEDIATRICS

## 2021-08-20 PROCEDURE — 1036F TOBACCO NON-USER: CPT | Performed by: PEDIATRICS

## 2021-08-20 PROCEDURE — 99214 OFFICE O/P EST MOD 30 MIN: CPT | Performed by: PEDIATRICS

## 2021-08-20 PROCEDURE — 0518F FALL PLAN OF CARE DOCD: CPT | Performed by: PEDIATRICS

## 2021-08-20 PROCEDURE — 3017F COLORECTAL CA SCREEN DOC REV: CPT | Performed by: PEDIATRICS

## 2021-08-20 PROCEDURE — G8417 CALC BMI ABV UP PARAM F/U: HCPCS | Performed by: PEDIATRICS

## 2021-08-20 ASSESSMENT — ENCOUNTER SYMPTOMS
TROUBLE SWALLOWING: 0
COLOR CHANGE: 0
SHORTNESS OF BREATH: 0
WHEEZING: 0
RHINORRHEA: 0
CONSTIPATION: 0
ABDOMINAL PAIN: 0
BACK PAIN: 1
CHEST TIGHTNESS: 0
COUGH: 0
EYE REDNESS: 0
SINUS PRESSURE: 0
DIARRHEA: 1
BLOOD IN STOOL: 0
CHOKING: 0
NAUSEA: 0
STRIDOR: 0
SORE THROAT: 0
EYE DISCHARGE: 0
EYE PAIN: 0
VOMITING: 0

## 2021-08-20 NOTE — PROGRESS NOTES
Nadine Collins (:  1950) is a 66 y.o. female,Established patient, here for evaluation of the following chief complaint(s):  Diabetes, Hypertension, and Hyperlipidemia         ASSESSMENT/PLAN:    1. Essential hypertension  2. Hyperlipidemia with target LDL less than 100  3. Mild intermittent asthma without complication  4. Chronic seasonal allergic rhinitis due to pollen  5. KRISHNA (obstructive sleep apnea)  6. Central sleep apnea  7. Restless leg syndrome  8. Gastroesophageal reflux disease without esophagitis  9. Moderate single current episode of major depressive disorder ([address]  10. Anxiety  11. S/P lumbar fusion  12. Diabetes mellitus type 2 with complications ([address]  13. Diabetic nephropathy associated with type 2 diabetes mellitus ([address]  14. Stage 3 chronic kidney disease, unspecified whether stage 3a or 3b CKD ([address]  15. Diabetic polyneuropathy associated with type 2 diabetes mellitus ([address]  16. Generalized osteoarthritis  17. Chronic pain of both shoulders  18. Right hip pain  19. Primary osteoarthritis of left knee  20. Abnormal thyroid blood test  21. Anemia, unspecified type  22. Anemia due to stage 3 chronic kidney disease, unspecified whether stage 3a or 3b CKD ([address]  23. Encounter for screening mammogram for malignant neoplasm of breast  -     PIETRO GARO DIGITAL SCREEN BILATERAL; Future          Proceed with continue current medications  Obtain labs when due  Follow-up with pulmonary as scheduled  Follow-up with GI as scheduled  Continue Prozac  Continue Ativan only as needed for severe anxiety  Follow-up with endocrinology as scheduled  Strict diabetic diet and exercise as tolerated  Yearly eye and foot exams recommended  Follow-up with Ortho  Obtain mammogram  Obtain DEXA scan  Tdap and shingles vaccines recommended  Call with concerns    Return in about 4 months (around 2021) for routine follow up.          Subjective     HPI    Patient presents today for routine follow-up of her chronic medical problems which include hypertension, hyperlipidemia, asthma, allergies, KRISHNA, central sleep apnea, restless leg syndrome, GERD, depression, anxiety, spondylolisthesis status post lumbar fusion. She also has a history of diabetes type 2 with chronic kidney disease and neuropathy. She has generalized osteoarthritis with pain in the shoulders, right hip and left knee, B12 deficiency, abnormal thyroid labs and history of anemia suspected to be anemia of chronic kidney disease. Overall she states she has been doing fairly well however she had picked up a GI bug over the course of the last 1 to 2 weeks. She stated that she had not been feeling well and ended up vomiting several times. She was very fatigued and felt like she had a temperature but did not. She had also had some weakness and shaking for several days. She then developed diarrhea that lasted about a week and is now improving. Her sister who lives with her now has the same illness. Her blood pressure is well controlled with losartan. She denies any side effects. She does continue to take Lipitor for her hyperlipidemia and denies side effects. Her asthma and allergies are well controlled with Singulair. Her obstructive sleep apnea and central sleep apnea is now treated with a Servo vent and she feels well with this. She does follow with pulmonology, Dr. Joycelyn Pascual. Her restless leg is controlled with Neurontin. Her GERD is controlled with Prilosec. She is supposed to see GI in the past because she does have a globus sensation and pills do occasionally get stuck. She does have a follow-up soon to reevaluate this. She does have a history of IBS and gets diarrhea intermittently. She is supposed to schedule a colonoscopy after seeing GI. She does have a history of depression and anxiety which is well controlled with Prozac daily and Ativan as needed. She has not used Ativan in a very long time.   She did undergo a posterior lumbar interbody fusion at L4/L5 due to spondylolisthesis in 2016. She does get intermittent low back pain but this is well controlled. She has taken Percocet in addition to her Neurontin and tizanidine as needed. She still complains of having the shakes and I did tell her that this could be side effects from her medication or possibly a essential tremor starting. I did advise her that I would be happy to give her a referral to neurology if she would like to see someone for further evaluation. Would like to hold off on this for right now. She does have a history of diabetes type 2 with chronic kidney disease and neuropathy that is followed by endocrinology, Dr. Ho Watters. She does continue to take insulin 70/30 and Endo lowered her insulin to 12 units daily and she decreases this to 7 units if her blood sugar is less than 100. She still takes glimepiride and Metformin. She will get occasional low blood sugars. Her eye exam is done with Dr. Douglas Keith. She has seen nephrology in the past but has not seen them in some time and she prefers not to see anyone if it is not necessary. She does get left knee pain secondary to osteoarthritis. She was seeing Dr. Erika Gibson for her severe osteoarthritis and should get back into see him soon. Her left shoulder also has arthritis and bothers her to. Her right shoulder bothers her often and her right hip. She tells me that her sister is going to have knee replacement surgery and once that is done she is going to get back to Ortho for further evaluation. She does have a history of B12 deficiency and takes an over-the-counter B 12 supplement. She does have a history of obesity and continues to work on trying to lose weight over time. She does have a history of abnormal thyroid labs. She did have a thyroid ultrasound that showed her thyroid gland was mildly heterogeneous and diminutive. Endocrinology advised her that they will continue to monitor her thyroid over time.   She does have a history of anemia which I suspect is anemia of chronic disease due to her CKD however she is supposed to have a colonoscopy and EGD with GI. This did get canceled because of Covid and she is awaiting a follow-up appointment soon. She is due for DEXA scan and she was reminded to have this done. She has no other concerns at this time. cmw        Review of Systems   Constitutional: Negative for activity change, appetite change, fatigue, fever and unexpected weight change. HENT: Negative for congestion, ear pain, postnasal drip, rhinorrhea, sinus pressure, sneezing, sore throat, tinnitus and trouble swallowing. Globus sensation often   Eyes: Negative for pain, discharge, redness and visual disturbance. Respiratory: Negative for cough, choking, chest tightness, shortness of breath, wheezing and stridor. Asthma well controlled with singulair daily and albuterol as needed. KRISHNA / central sleep apnea controlled with Servovent nightly - she follows up with Dr. Homero Hudson. Cardiovascular: Negative for chest pain, palpitations and leg swelling. Gastrointestinal: Positive for diarrhea (secondary to irritable bowel - improved with DataPad but she does have intermittent episodes once per week). Negative for abdominal pain, blood in stool, constipation, nausea and vomiting. GERD treated with omeprazole and tums occasionally   Endocrine: Negative for polydipsia, polyphagia and polyuria. History of abnormal thyroid labs    Genitourinary: Negative for dysuria, pelvic pain and urgency. Musculoskeletal: Positive for arthralgias (right shoulder pain chronic / left shoulder pain / right hip pain / left knee pain) and back pain (Mid back pain occasionally). Negative for myalgias and neck pain. Skin: Positive for rash (only a few excoriated macules over the arms and back - c/w neurodermatitis - much improved from before ). Negative for color change and wound. Allergic/Immunologic: Negative for immunocompromised state. Neurological: Positive for tremors (shaky at times), weakness and numbness. Negative for dizziness, light-headedness and headaches. Hematological: Negative for adenopathy. Does not bruise/bleed easily. History of mild anemia   Psychiatric/Behavioral: Positive for dysphoric mood (stable with prozac) and sleep disturbance (stable with trazodone). Negative for agitation, confusion and decreased concentration. The patient is nervous/anxious (stable). Objective      Physical Exam  Vitals and nursing note reviewed. Constitutional:       General: She is not in acute distress. Appearance: Normal appearance. She is well-developed. She is obese. She is not ill-appearing, toxic-appearing or diaphoretic. HENT:      Head: Normocephalic. Right Ear: Tympanic membrane, ear canal and external ear normal. There is no impacted cerumen. Left Ear: Tympanic membrane, ear canal and external ear normal. There is no impacted cerumen. Nose: Nose normal. No congestion or rhinorrhea. Mouth/Throat:      Mouth: Mucous membranes are moist.      Pharynx: No oropharyngeal exudate. Eyes:      General: No scleral icterus. Right eye: No discharge. Left eye: No discharge. Extraocular Movements: Extraocular movements intact. Conjunctiva/sclera: Conjunctivae normal.      Pupils: Pupils are equal, round, and reactive to light. Neck:      Thyroid: No thyromegaly. Vascular: No JVD. Cardiovascular:      Rate and Rhythm: Normal rate and regular rhythm. Pulses: Normal pulses. Heart sounds: Murmur heard. Pulmonary:      Effort: Pulmonary effort is normal.      Breath sounds: Normal breath sounds. No stridor. No wheezing or rales. Abdominal:      General: Bowel sounds are normal.      Palpations: Abdomen is soft. There is no mass. Tenderness: There is no abdominal tenderness.  There is no right CVA tenderness or left CVA tenderness. Musculoskeletal:      Right shoulder: Tenderness present. Decreased range of motion. Left shoulder: Tenderness present. Decreased range of motion. Cervical back: Normal range of motion and neck supple. Lumbar back: Tenderness present. Decreased range of motion. Back:       Right hip: Tenderness present. Normal strength. Left hip: Tenderness present. Normal strength. Right knee: Normal.      Left knee: Decreased range of motion. Tenderness present over the medial joint line. Right lower leg: No edema. Left lower leg: No edema. Lymphadenopathy:      Cervical: No cervical adenopathy. Skin:     General: Skin is warm. Capillary Refill: Capillary refill takes less than 2 seconds. Findings: Rash (only a few excoriated macules over the legs, arms and breasts / chest and back ) present. Neurological:      General: No focal deficit present. Mental Status: She is alert and oriented to person, place, and time. Cranial Nerves: No cranial nerve deficit. Motor: No abnormal muscle tone. Coordination: Coordination normal.      Deep Tendon Reflexes: Reflexes normal.   Psychiatric:         Attention and Perception: Attention normal.         Mood and Affect: Mood normal. Mood is not anxious or depressed. Speech: Speech normal.         Behavior: Behavior normal.         Thought Content: Thought content normal.         Judgment: Judgment normal.            An electronic signature was used to authenticate this note.     --Felipe Carrillo MD

## 2021-09-01 DIAGNOSIS — I10 HYPERTENSION, UNSPECIFIED TYPE: ICD-10-CM

## 2021-09-02 RX ORDER — LOSARTAN POTASSIUM 100 MG/1
100 TABLET ORAL DAILY
Qty: 90 TABLET | Refills: 1 | Status: SHIPPED | OUTPATIENT
Start: 2021-09-02 | End: 2022-08-30

## 2021-09-02 NOTE — TELEPHONE ENCOUNTER
Fasting Once 10/14/2021   Vitamin B12 Once 10/14/2021   CBC With Auto Differential Once 10/14/2021   Iron And TIBC Once 10/14/2021   Ferritin Once 10/14/2021   Potassium Once 07/01/2022   PIETRO GARO DIGITAL SCREEN BILATERAL Once 08/20/2021       Next Visit Date:  Future Appointments   Date Time Provider Eva Colbyi   9/27/2021  1:15 PM Gabriela Petersen MD [facility]   12/29/2021  2:00 PM Kendall Doyle MD [facility]            Patient Active Problem List:     Restless leg syndrome     KRISHNA on CPAP     Diabetes mellitus type 2 with complications ([address]     Hyperlipidemia with target LDL less than 100     Asthma     Allergic rhinitis     Essential hypertension     Major depression     Anxiety     Spondylisthesis     S/P lumbar fusion     Gastroesophageal reflux disease     Diabetic nephropathy associated with type 2 diabetes mellitus (HCC)     Moderate single current episode of major depressive disorder ([address]     Irritable bowel syndrome with diarrhea     Morbid obesity with BMI of 40.0-44.9, adult (HCC)     Chronic kidney disease, stage III (moderate) (HCC)     Diabetic polyneuropathy associated with type 2 diabetes mellitus ([address]     B12 deficiency     Central sleep apnea     GERD (gastroesophageal reflux disease)     Dysphagia     Anemia due to stage 3 chronic kidney disease ([address]

## 2021-09-27 ENCOUNTER — OFFICE VISIT (OUTPATIENT)
Dept: GASTROENTEROLOGY | Age: 71
End: 2021-09-27
Payer: MEDICARE

## 2021-09-27 VITALS
HEART RATE: 57 BPM | DIASTOLIC BLOOD PRESSURE: 62 MMHG | HEIGHT: 63 IN | SYSTOLIC BLOOD PRESSURE: 139 MMHG | WEIGHT: 224 LBS | BODY MASS INDEX: 39.69 KG/M2

## 2021-09-27 DIAGNOSIS — E78.5 HYPERLIPIDEMIA, UNSPECIFIED HYPERLIPIDEMIA TYPE: ICD-10-CM

## 2021-09-27 DIAGNOSIS — K58.0 IRRITABLE BOWEL SYNDROME WITH DIARRHEA: ICD-10-CM

## 2021-09-27 DIAGNOSIS — E66.01 SEVERE OBESITY (BMI 35.0-35.9 WITH COMORBIDITY) (HCC): ICD-10-CM

## 2021-09-27 DIAGNOSIS — F32.9 MAJOR DEPRESSIVE DISORDER WITH CURRENT ACTIVE EPISODE, UNSPECIFIED DEPRESSION EPISODE SEVERITY, UNSPECIFIED WHETHER RECURRENT: ICD-10-CM

## 2021-09-27 DIAGNOSIS — D63.1 ANEMIA DUE TO STAGE 3 CHRONIC KIDNEY DISEASE, UNSPECIFIED WHETHER STAGE 3A OR 3B CKD (HCC): ICD-10-CM

## 2021-09-27 DIAGNOSIS — N18.30 ANEMIA DUE TO STAGE 3 CHRONIC KIDNEY DISEASE, UNSPECIFIED WHETHER STAGE 3A OR 3B CKD (HCC): ICD-10-CM

## 2021-09-27 DIAGNOSIS — E66.8 MODERATE OBESITY: ICD-10-CM

## 2021-09-27 DIAGNOSIS — R13.19 ESOPHAGEAL DYSPHAGIA: ICD-10-CM

## 2021-09-27 DIAGNOSIS — K21.9 GASTROESOPHAGEAL REFLUX DISEASE, UNSPECIFIED WHETHER ESOPHAGITIS PRESENT: Primary | ICD-10-CM

## 2021-09-27 DIAGNOSIS — R10.13 ABDOMINAL PAIN, EPIGASTRIC: ICD-10-CM

## 2021-09-27 PROBLEM — E66.9 MODERATE OBESITY: Status: ACTIVE | Noted: 2021-09-27

## 2021-09-27 PROCEDURE — G8417 CALC BMI ABV UP PARAM F/U: HCPCS | Performed by: INTERNAL MEDICINE

## 2021-09-27 PROCEDURE — 3288F FALL RISK ASSESSMENT DOCD: CPT | Performed by: INTERNAL MEDICINE

## 2021-09-27 PROCEDURE — G8399 PT W/DXA RESULTS DOCUMENT: HCPCS | Performed by: INTERNAL MEDICINE

## 2021-09-27 PROCEDURE — 1036F TOBACCO NON-USER: CPT | Performed by: INTERNAL MEDICINE

## 2021-09-27 PROCEDURE — 0518F FALL PLAN OF CARE DOCD: CPT | Performed by: INTERNAL MEDICINE

## 2021-09-27 PROCEDURE — 3017F COLORECTAL CA SCREEN DOC REV: CPT | Performed by: INTERNAL MEDICINE

## 2021-09-27 PROCEDURE — G8427 DOCREV CUR MEDS BY ELIG CLIN: HCPCS | Performed by: INTERNAL MEDICINE

## 2021-09-27 PROCEDURE — 4040F PNEUMOC VAC/ADMIN/RCVD: CPT | Performed by: INTERNAL MEDICINE

## 2021-09-27 PROCEDURE — 99204 OFFICE O/P NEW MOD 45 MIN: CPT | Performed by: INTERNAL MEDICINE

## 2021-09-27 PROCEDURE — 1123F ACP DISCUSS/DSCN MKR DOCD: CPT | Performed by: INTERNAL MEDICINE

## 2021-09-27 PROCEDURE — 1090F PRES/ABSN URINE INCON ASSESS: CPT | Performed by: INTERNAL MEDICINE

## 2021-09-27 RX ORDER — ACETAMINOPHEN 160 MG
TABLET,DISINTEGRATING ORAL DAILY
COMMUNITY

## 2021-09-27 RX ORDER — POLYETHYLENE GLYCOL 3350, SODIUM SULFATE ANHYDROUS, SODIUM BICARBONATE, SODIUM CHLORIDE, POTASSIUM CHLORIDE 236; 22.74; 6.74; 5.86; 2.97 G/4L; G/4L; G/4L; G/4L; G/4L
4 POWDER, FOR SOLUTION ORAL ONCE
Qty: 4000 ML | Refills: 0 | Status: SHIPPED | OUTPATIENT
Start: 2021-09-27 | End: 2021-09-27

## 2021-09-27 RX ORDER — TRAZODONE HYDROCHLORIDE 100 MG/1
100-200 TABLET ORAL NIGHTLY
Qty: 180 TABLET | Refills: 1 | Status: SHIPPED | OUTPATIENT
Start: 2021-09-27 | End: 2022-05-31

## 2021-09-27 RX ORDER — ATORVASTATIN CALCIUM 80 MG/1
80 TABLET, FILM COATED ORAL DAILY
Qty: 90 TABLET | Refills: 1 | Status: SHIPPED | OUTPATIENT
Start: 2021-09-27 | End: 2022-05-31

## 2021-09-27 NOTE — TELEPHONE ENCOUNTER
Last visit: 8/20/21  Last Med refill: 12/7/20  Does patient have enough medication for 72 hours: No:     Next Visit Date:  Future Appointments   Date Time Provider Eva Jailene   12/29/2021  2:00 PM MD [facility]   12/30/2021  2:30 PM Ada Mcclellan MD [facility] Maintenance   Topic Date Due    DTaP/Tdap/Td vaccine (1 - Tdap) Never done    Shingles Vaccine (2 of 3) 09/28/2012    Flu vaccine (1) 09/01/2021    Diabetic retinal exam  10/29/2021    Breast cancer screen  12/02/2021    Diabetic foot exam  12/30/2021    A1C test (Diabetic or Prediabetic)  07/01/2022    Lipid screen  07/01/2022    Potassium monitoring  07/01/2022    Creatinine monitoring  07/01/2022    Annual Wellness Visit (AWV)  07/09/2022    Colon cancer screen colonoscopy  01/23/2024    Pneumococcal 65+ years Vaccine  Completed    COVID-19 Vaccine  Completed    Hepatitis C screen  Completed    DEXA (modify frequency per FRAX score)  Addressed    Hepatitis A vaccine  Aged Out    Hib vaccine  Aged Out    Meningococcal (ACWY) vaccine  Aged Out       Hemoglobin A1C (%)   Date Value   07/01/2021 7.1 (H)   12/29/2020 6.9 (H)   08/17/2020 7.4 (H)             ( goal A1C is < 7)   Microalb/Crt.  Ratio (mcg/mg creat)   Date Value   07/01/2021 CANNOT BE CALCULATED     LDL Cholesterol (mg/dL)   Date Value   07/01/2021 63   12/29/2020 69     LDL Calculated (mg/dL)   Date Value   07/29/2013 82       (goal LDL is <100)   AST (U/L)   Date Value   07/01/2021 20   07/01/2021 20     ALT (U/L)   Date Value   07/01/2021 19   07/01/2021 19     BUN (mg/dL)   Date Value   07/01/2021 21   07/01/2021 21     BP Readings from Last 3 Encounters:   09/27/21 139/62   08/20/21 122/80   03/26/21 116/76          (goal 120/80)    All Future Testing planned in [facility]  Lab Frequency Next Occurrence   DEXA BONE DENSITY 2 SITES Once 03/26/2021   Potassium Once 07/01/2022   Comprehensive Metabolic Panel, Fasting Once 10/15/2021   Vitamin B12 Once 10/15/2021   CBC Once 10/15/2021   Iron And TIBC Once 10/15/2021   Ferritin Once 10/15/2021   Comprehensive Metabolic Panel, Fasting Once 10/14/2021   Vitamin B12 Once 10/14/2021   CBC With Auto Differential Once 10/14/2021   Iron And TIBC Once 10/14/2021   Ferritin Once 10/14/2021   Potassium Once 07/01/2022   PIETRO GARO DIGITAL SCREEN BILATERAL Once 09/25/2021   EGD Once 12/27/2021   COLONOSCOPY W/ OR W/O BIOPSY Once 12/28/2021               Patient Active Problem List:     Restless leg syndrome     KRISHNA on CPAP     Diabetes mellitus type 2 with complications (HCC)     Hyperlipidemia with target LDL less than 100     Asthma     Allergic rhinitis     Essential hypertension     Major depression     Anxiety     Spondylisthesis     S/P lumbar fusion     Gastroesophageal reflux disease     Diabetic nephropathy associated with type 2 diabetes mellitus (HCC)     Moderate single current episode of major depressive disorder ([address]     Irritable bowel syndrome with diarrhea     Morbid obesity with BMI of 40.0-44.9, adult (HCC)     Chronic kidney disease, stage III (moderate) (HCC)     Diabetic polyneuropathy associated with type 2 diabetes mellitus ([address]     B12 deficiency     Central sleep apnea     GERD (gastroesophageal reflux disease)     Dysphagia     Anemia due to stage 3 chronic kidney disease (HCC)     Abdominal pain, epigastric     Moderate obesity     Severe obesity (BMI 35.0-35.9 with comorbidity) ([address]

## 2021-09-27 NOTE — PROGRESS NOTES
GI NEW PATIENT OFFICE VISIT    INTERVAL HISTORY:   Elvis Beckford MD  [address] [facility],  [address] [place] [address]    Chief Complaint   Patient presents with    GI Problem     new patient       1. Gastroesophageal reflux disease, unspecified whether esophagitis present    2. Anemia due to stage 3 chronic kidney disease, unspecified whether stage 3a or 3b CKD (HCC)    3. Esophageal dysphagia    4. Irritable bowel syndrome with diarrhea    5. Abdominal pain, epigastric    6. Severe obesity (BMI 35.0-35.9 with comorbidity) ([address]    7. Moderate obesity      This patient is evaluated in my office for the first time  She has history significant of multiple medical issues  She has history for renal insufficiency  History for obstructive sleep apnea  Diabetes mellitus  Has renal insufficiency    She has history for chronic anemia  She denies any overt GI bleeding    Patient has been complaining of some abdominal pains, off and on cramping  Also complains of abdominal bloating and gas  Has off and on nausea without any sig vomiting  Has some alternating constipation and diarrhea  Has no weight loss  Has some anxiety issues    Denies smoking alcohol abuse illicit drug usage    She had a colonoscopy done 5 6 years ago apparently was not cleanout very well had diverticulosis per report    She has been complaining of significant epigastric discomfort and pain has history for chronic GERD has been taking proton pump therapy before also taking famotidine in addition to PPIs she thinks that she has an ulcer? Although she denies any melanotic stools hematemesis etc.        HISTORY OF PRESENT ILLNESS: Cele Rodríguez is a 66 y.o. female with a past history remarkable for , referred for evaluation of   Chief Complaint   Patient presents with    GI Problem     new patient   .     Past Medical,Family, and Social History reviewed and does contribute to the patient presenting condition. Patient's PMH/PSH,SH,PSYCH Hx, MEDs, ALLERGIES, and ROS were all reviewed and updated in the appropriate sections. PAST MEDICAL HISTORY:  Past Medical History:   Diagnosis Date    Allergic rhinitis 2014    Anemia due to stage 3 chronic kidney disease ([address]     Anxiety 2015    on Ativan PRN    Arthritis     Asthma     on Inhaler    Chronic back pain     lumbar spondylolisthesis, stenosis    Chronic kidney disease     Diabetic nephropathy associated with type 2 diabetes mellitus ([address] 2017    Diabetic polyneuropathy associated with type 2 diabetes mellitus ([address] 2020    Dysphagia     Fibromyalgia     GERD (gastroesophageal reflux disease)     on Zantac & Omeprazole    Hearing loss     Hearing loss     LEFT, no hearing aid    History of anemia     Hyperlipidemia 2006    on Meds    Hypertension 2006    on Meds    Irritable bowel syndrome with diarrhea 3/7/2018    Major depression 2015    Moderate single current episode of major depressive disorder ([address] 2017    Obesity     KRISHNA on CPAP     pulmonologist= Dr. Simon Pham Osteoarthritis     Restless leg syndrome     Screening for colorectal cancer     Tremor     essential bilateral hand tremor, worse in a.m.  Type II or unspecified type diabetes mellitus without mention of complication, not stated as uncontrolled 2006    on Insulin & Meds    Wears glasses        Past Surgical History:   Procedure Laterality Date    APPENDECTOMY  2002    BACK SURGERY  2016    LUMBAR FUSION L4-5    CARDIOVASCULAR STRESS TEST  3/2/16     SECTION  1975    CHOLECYSTECTOMY  1998    COLONOSCOPY      x2    KNEE ARTHROPLASTY Right 2015    OTHER SURGICAL HISTORY      Cortisone Injection  to Rt. Knee & Rt.  Wrist    TONSILLECTOMY AND ADENOIDECTOMY      TRANSTHORACIC ECHOCARDIOGRAM  16    UPPER GASTROINTESTINAL ENDOSCOPY CURRENT MEDICATIONS:    Current Outpatient Medications:     Cholecalciferol (VITAMIN D3) 50 MCG (2000 UT) CAPS, Take by mouth daily, Disp: , Rfl:     losartan (COZAAR) 100 MG tablet, Take 1 tablet by mouth daily, Disp: 90 tablet, Rfl: 1    FLUoxetine (PROZAC) 40 MG capsule, Take 1 capsule by mouth daily, Disp: 90 capsule, Rfl: 1    nystatin (MYCOSTATIN) 879542 UNIT/GM ointment, APPLY TO THE AFFECTED AREA(S) TOPICALLY TWICE DAILY, Disp: 90 g, Rfl: 0    tiZANidine (ZANAFLEX) 2 MG tablet, TAKE 1 TABLET EVERY 8 HOURS AS NEEDED FOR MUSCLE SPASM(S), Disp: 270 tablet, Rfl: 1    Handicap Placard MISC, by Does not apply route Expires 3/26/2026, Disp: 1 each, Rfl: 0    gabapentin (NEURONTIN) 300 MG capsule, Take 1 capsule by mouth 3 times daily. , Disp: 270 capsule, Rfl: 1    atorvastatin (LIPITOR) 80 MG tablet, Take 1 tablet by mouth daily, Disp: 90 tablet, Rfl: 1    traZODone (DESYREL) 100 MG tablet, Take 1-2 tablets by mouth nightly, Disp: 180 tablet, Rfl: 1    metFORMIN (GLUCOPHAGE) 500 MG tablet, Take 500 mg by mouth 2 times daily (with meals), Disp: , Rfl:     BD INSULIN SYRINGE ULTRAFINE 31G X 15/64\" 0.5 ML MISC, , Disp: , Rfl:     NOVOLIN 70/30 (70-30) 100 UNIT/ML injection vial, Inject into the skin 2 times daily (with meals) 18 in the AM 18 at night, Disp: , Rfl:     ACCU-CHEK TARIQ PLUS strip, , Disp: , Rfl:     aspirin 81 MG tablet, Take 1 tablet by mouth nightly, Disp: 30 tablet, Rfl: 3    glimepiride (AMARYL) 4 MG tablet, Take 1 tablet by mouth every morning (before breakfast) , Disp: , Rfl:     B-D ULTRAFINE III SHORT PEN 31G X 8 MM MISC, , Disp: , Rfl:     omeprazole (PRILOSEC) 20 MG capsule, Take 20 mg by mouth nightly Indications: GERD-Related Laryngitis , Disp: , Rfl:     albuterol (PROVENTIL) (2.5 MG/3ML) 0.083% nebulizer solution, Take 2.5 mg by nebulization every 4 hours as needed for Wheezing , Disp: , Rfl:     oxyCODONE-acetaminophen (PERCOCET) 5-325 MG per tablet, Take 2 tablets by mouth every 4 hours as needed (spondylisis) for up to 30 days. (Patient not taking: Reported on 9/27/2021), Disp: 30 tablet, Rfl: 0    ALLERGIES:   Allergies   Allergen Reactions    Demerol Hcl [Meperidine] Nausea And Vomiting    Victoza [Liraglutide] Other (See Comments)     Pancriatitis       FAMILY HISTORY:       Problem Relation Age of Onset    Heart Disease Mother     Kidney Disease Mother     High Blood Pressure Mother     Diabetes Father     Alzheimer's Disease Father     Diabetes Sister     Diabetes Maternal Grandmother     High Blood Pressure Maternal Grandmother     Alcohol Abuse Other         maternal great grandfather         SOCIAL HISTORY:   Social History     Socioeconomic History    Marital status: Single     Spouse name: Not on file    Number of children: 3    Years of education: Not on file    Highest education level: Not on file   Occupational History    Occupation: Retired RN     Employer: [employer]   Tobacco Use    Smoking status: Never Smoker    Smokeless tobacco: Never Used   Vaping Use    Vaping Use: Never used   Substance and Sexual Activity    Alcohol use: No     Alcohol/week: 0.0 standard drinks     Comment: sober x 27 yrs    Drug use: No    Sexual activity: Never   Other Topics Concern    Not on file   Social History Narrative    Not on file     Social Determinants of Health     Financial Resource Strain:     Difficulty of Paying Living Expenses:    Food Insecurity:     Worried About Running Out of Food in the Last Year:     Ran Out of Food in the Last Year:    Transportation Needs:     Lack of Transportation (Medical):      Lack of Transportation (Non-Medical):    Physical Activity:     Days of Exercise per Week:     Minutes of Exercise per Session:    Stress:     Feeling of Stress :    Social Connections:     Frequency of Communication with Friends and Family:     Frequency of Social Gatherings with Friends and Family:     CREATININE 1.31 (H) 07/01/2021    CREATININE 1.31 (H) 07/01/2021    LABPROT 6.9 02/25/2013    LABALBU 3.7 07/01/2021    LABALBU 3.7 07/01/2021    BILITOT 0.28 (L) 07/01/2021    BILITOT 0.28 (L) 07/01/2021    ALKPHOS 160 (H) 07/01/2021    ALKPHOS 160 (H) 07/01/2021    AST 20 07/01/2021    AST 20 07/01/2021    ALT 19 07/01/2021    ALT 19 07/01/2021    INR 1.0 01/28/2015         Lab Results   Component Value Date    RBC 3.51 (L) 07/01/2021    HGB 9.3 (L) 07/01/2021    MCV 92.0 07/01/2021    MCH 26.5 07/01/2021    MCHC 28.8 07/01/2021    RDW 15.9 (H) 07/01/2021    MPV 10.2 07/01/2021    BASOPCT 1 12/28/2016    LYMPHSABS 2.60 12/28/2016    MONOSABS 0.60 12/28/2016    NEUTROABS 3.70 12/28/2016    EOSABS 0.40 12/28/2016    BASOSABS 0.00 12/28/2016         DIAGNOSTIC TESTING:     No results found. Assessment  1. Gastroesophageal reflux disease, unspecified whether esophagitis present    2. Anemia due to stage 3 chronic kidney disease, unspecified whether stage 3a or 3b CKD (HCC)    3. Esophageal dysphagia    4. Irritable bowel syndrome with diarrhea    5. Abdominal pain, epigastric    6. Severe obesity (BMI 35.0-35.9 with comorbidity) ([facility]    7. Moderate obesity        Plan    Plan EGD and Colonoscopy     The Endoscopic procedure was explained to the patient in detail  The prep and NPO were explained  All the Risks, Benefits, and Alternatives were explained  Risk of Bleeding, Perforation and Cardio Respiratory risks were explained  her questions were answered  The procedure has been scheduled with the  in the office  Patient was asked to give us a call for any questions  The patient has verbalized understanding and agreement to this plan. Pt was advised in detail about some life style and dietary modifications. She was advised about avoidance of caffeine, nicotine and chocolate. Pt was also told to stay away from any kind of fast foods, soda pops.  She was also advised to avoid lots of spices, grease and fried food etc.     Instructions were also given about trying to arrange the timing, quality and quantity of food. Instructions were given about using ample amount of fiber including dietary and supplemental fiber either metamucil, bennafiber or citrucell etc.  Pt was advised about drinking ample amount of water without any colors or chemicals. Stress was given about regular exercise. Pt has verbalized understanding and agreement to these modifications. Pt was discussed in detail about the possible side effects of proton pump inhibiter therapy. She was explained about the possibility of calcium and magnesium malabsorption and was advised to start taking calcium supplements with Vit D. Some over the counter regimens were explained to patient. Some dietary advices were also given. She has verbalized understanding and agreement to this. Pt seems to have signs and symptoms consistent with GERD, acid indigestion and heartburns. She was discussed  in detail about some possible life style and dietary modifications. She was stressed about the maintenance  of appropriate weight and effect of obesity contributing to reflux symptoms. Routine exercise was streesed. Avoidance of Caffeine, nicotine and chocolate were explained. Pt was asked to avoid spices grease and fried food. Advices were also given about avoidance of any kind of fast foods, soda pops and high energy drinks. Pt was advised to place two small block under the head end of the bed which may help with night time reflux. Was advised not to eat any thin at least 2-3 hrs before going to bed and walk especially after dinner    Pt has verbalized understanding and agreement to this plan. Thank you for allowing me to participate in the care of Ms. Orta. For any further questions please do not hesitate to contact me. I have reviewed and agree with the ROS entered by the MA/Nurse.          Armando Frankel MD, [facility]  Board Certified in Gastroenterology and Internal Medicine  [facility]  Office #: [phone]

## 2021-10-19 DIAGNOSIS — M43.10 SPONDYLOLISTHESIS, UNSPECIFIED SPINAL REGION: ICD-10-CM

## 2021-10-19 DIAGNOSIS — B37.2 YEAST DERMATITIS: ICD-10-CM

## 2021-10-19 DIAGNOSIS — G25.81 RESTLESS LEG SYNDROME: ICD-10-CM

## 2021-10-20 ENCOUNTER — TELEPHONE (OUTPATIENT)
Dept: GASTROENTEROLOGY | Age: 71
End: 2021-10-20

## 2021-10-20 RX ORDER — TIZANIDINE 2 MG/1
2 TABLET ORAL EVERY 8 HOURS PRN
Qty: 270 TABLET | Refills: 1 | Status: SHIPPED | OUTPATIENT
Start: 2021-10-20 | End: 2022-04-04

## 2021-10-20 RX ORDER — NYSTATIN 100000 U/G
OINTMENT TOPICAL
Qty: 90 G | Refills: 0 | Status: SHIPPED | OUTPATIENT
Start: 2021-10-20 | End: 2021-12-20

## 2021-10-20 RX ORDER — GABAPENTIN 300 MG/1
300 CAPSULE ORAL 3 TIMES DAILY
Qty: 270 CAPSULE | Refills: 1 | Status: SHIPPED | OUTPATIENT
Start: 2021-10-20 | End: 2022-02-28 | Stop reason: SDUPTHER

## 2021-10-20 NOTE — TELEPHONE ENCOUNTER
LOV 8-20-21  LRF Gabapentin 12-30-20, Nystatin 7-21-21, Tizanidine 5-11-21    Health Maintenance   Topic Date Due    DTaP/Tdap/Td vaccine (1 - Tdap) Never done    Shingles Vaccine (2 of 3) 09/28/2012    Flu vaccine (1) 09/01/2021    Diabetic retinal exam  10/29/2021    Breast cancer screen  12/02/2021    Diabetic foot exam  12/30/2021    A1C test (Diabetic or Prediabetic)  07/01/2022    Lipid screen  07/01/2022    Potassium monitoring  07/01/2022    Creatinine monitoring  07/01/2022    Annual Wellness Visit (AWV)  07/09/2022    Colon cancer screen colonoscopy  01/23/2024    Pneumococcal 65+ years Vaccine  Completed    COVID-19 Vaccine  Completed    Hepatitis C screen  Completed    DEXA (modify frequency per FRAX score)  Addressed    Hepatitis A vaccine  Aged Out    Hib vaccine  Aged Out    Meningococcal (ACWY) vaccine  Aged Out             (applicable per patient's age: Cancer Screenings, Depression Screening, Fall Risk Screening, Immunizations)    Hemoglobin A1C (%)   Date Value   07/01/2021 7.1 (H)   12/29/2020 6.9 (H)   08/17/2020 7.4 (H)     Microalb/Crt.  Ratio (mcg/mg creat)   Date Value   07/01/2021 CANNOT BE CALCULATED     LDL Cholesterol (mg/dL)   Date Value   07/01/2021 63     LDL Calculated (mg/dL)   Date Value   07/29/2013 82     AST (U/L)   Date Value   07/01/2021 20   07/01/2021 20     ALT (U/L)   Date Value   07/01/2021 19   07/01/2021 19     BUN (mg/dL)   Date Value   07/01/2021 21   07/01/2021 21      (goal A1C is < 7)   (goal LDL is <100) need 30-50% reduction from baseline     BP Readings from Last 3 Encounters:   09/27/21 139/62   08/20/21 122/80   03/26/21 116/76    (goal /80)      All Future Testing planned in [facility]:  Lab Frequency Next Occurrence   DEXA BONE DENSITY 2 SITES Once 03/26/2021   Potassium Once 07/01/2022   Comprehensive Metabolic Panel, Fasting Once 10/15/2021   Vitamin B12 Once 10/15/2021   CBC Once 10/15/2021   Iron And TIBC Once 10/15/2021   Ferritin Once 10/15/2021   Comprehensive Metabolic Panel, Fasting Once 10/14/2021   Vitamin B12 Once 10/14/2021   CBC With Auto Differential Once 10/14/2021   Iron And TIBC Once 10/14/2021   Ferritin Once 10/14/2021   Potassium Once 07/01/2022   PIETRO GARO DIGITAL SCREEN BILATERAL Once 10/26/2021   EGD Once 12/27/2021   COLONOSCOPY W/ OR W/O BIOPSY Once 12/28/2021       Next Visit Date:  Future Appointments   Date Time Provider Eva Dent   12/29/2021  2:00 PM MD [facility] Douglas Rodriguez   12/30/2021  2:30 PM Tyler Orozco MD [facility] Douglas Rodriguez            Patient Active Problem List:     Restless leg syndrome     KRISHNA on CPAP     Diabetes mellitus type 2 with complications ([address]     Hyperlipidemia with target LDL less than 100     Asthma     Allergic rhinitis     Essential hypertension     Major depression     Anxiety     Spondylisthesis     S/P lumbar fusion     Gastroesophageal reflux disease     Diabetic nephropathy associated with type 2 diabetes mellitus (HCC)     Moderate single current episode of major depressive disorder ([address]     Irritable bowel syndrome with diarrhea     Morbid obesity with BMI of 40.0-44.9, adult (HCC)     Chronic kidney disease, stage III (moderate) ([facility])     Diabetic polyneuropathy associated with type 2 diabetes mellitus ([address]     B12 deficiency     Central sleep apnea     GERD (gastroesophageal reflux disease)     Dysphagia     Anemia due to stage 3 chronic kidney disease (HCC)     Abdominal pain, epigastric     Moderate obesity     Severe obesity (BMI 35.0-35.9 with comorbidity) ([address]

## 2021-10-20 NOTE — TELEPHONE ENCOUNTER
Attempted to call pt to let her know there was an Appt change due to provider being unavailable. Pt's new appt is on 12/16/21 at 2:30 pm. Letter sent.

## 2021-11-03 ENCOUNTER — ANESTHESIA EVENT (OUTPATIENT)
Dept: OPERATING ROOM | Age: 71
End: 2021-11-03
Payer: MEDICARE

## 2021-11-08 ENCOUNTER — HOSPITAL ENCOUNTER (OUTPATIENT)
Age: 71
Setting detail: OUTPATIENT SURGERY
Discharge: HOME | End: 2021-11-08
Attending: INTERNAL MEDICINE | Admitting: INTERNAL MEDICINE
Payer: MEDICARE

## 2021-11-08 ENCOUNTER — ANESTHESIA (OUTPATIENT)
Dept: OPERATING ROOM | Age: 71
End: 2021-11-08
Payer: MEDICARE

## 2021-11-08 VITALS
RESPIRATION RATE: 16 BRPM | DIASTOLIC BLOOD PRESSURE: 94 MMHG | OXYGEN SATURATION: 99 % | SYSTOLIC BLOOD PRESSURE: 145 MMHG

## 2021-11-08 VITALS
WEIGHT: 219.1 LBS | OXYGEN SATURATION: 100 % | BODY MASS INDEX: 38.82 KG/M2 | RESPIRATION RATE: 17 BRPM | HEART RATE: 61 BPM | SYSTOLIC BLOOD PRESSURE: 185 MMHG | HEIGHT: 63 IN | TEMPERATURE: 97.3 F | DIASTOLIC BLOOD PRESSURE: 90 MMHG

## 2021-11-08 LAB
GLUCOSE BLD-MCNC: 113 MG/DL (ref 65–105)
GLUCOSE BLD-MCNC: 119 MG/DL (ref 65–105)

## 2021-11-08 PROCEDURE — 2500000003 HC RX 250 WO HCPCS: Performed by: NURSE ANESTHETIST, CERTIFIED REGISTERED

## 2021-11-08 PROCEDURE — 3609027000 HC COLONOSCOPY: Performed by: INTERNAL MEDICINE

## 2021-11-08 PROCEDURE — 7100000011 HC PHASE II RECOVERY - ADDTL 15 MIN: Performed by: INTERNAL MEDICINE

## 2021-11-08 PROCEDURE — 3609017100 HC EGD: Performed by: INTERNAL MEDICINE

## 2021-11-08 PROCEDURE — 3700000001 HC ADD 15 MINUTES (ANESTHESIA): Performed by: INTERNAL MEDICINE

## 2021-11-08 PROCEDURE — 82947 ASSAY GLUCOSE BLOOD QUANT: CPT

## 2021-11-08 PROCEDURE — 3700000000 HC ANESTHESIA ATTENDED CARE: Performed by: INTERNAL MEDICINE

## 2021-11-08 PROCEDURE — 2580000003 HC RX 258: Performed by: NURSE ANESTHETIST, CERTIFIED REGISTERED

## 2021-11-08 PROCEDURE — 7100000010 HC PHASE II RECOVERY - FIRST 15 MIN: Performed by: INTERNAL MEDICINE

## 2021-11-08 PROCEDURE — 2709999900 HC NON-CHARGEABLE SUPPLY: Performed by: INTERNAL MEDICINE

## 2021-11-08 PROCEDURE — 2580000003 HC RX 258: Performed by: ANESTHESIOLOGY

## 2021-11-08 PROCEDURE — 45378 DIAGNOSTIC COLONOSCOPY: CPT | Performed by: INTERNAL MEDICINE

## 2021-11-08 PROCEDURE — 43239 EGD BIOPSY SINGLE/MULTIPLE: CPT | Performed by: INTERNAL MEDICINE

## 2021-11-08 PROCEDURE — 6360000002 HC RX W HCPCS: Performed by: NURSE ANESTHETIST, CERTIFIED REGISTERED

## 2021-11-08 PROCEDURE — 88305 TISSUE EXAM BY PATHOLOGIST: CPT

## 2021-11-08 RX ORDER — LIDOCAINE HYDROCHLORIDE 10 MG/ML
1 INJECTION, SOLUTION EPIDURAL; INFILTRATION; INTRACAUDAL; PERINEURAL
Status: DISCONTINUED | OUTPATIENT
Start: 2021-11-09 | End: 2021-11-08 | Stop reason: HOSPADM

## 2021-11-08 RX ORDER — SODIUM CHLORIDE 9 MG/ML
25 INJECTION, SOLUTION INTRAVENOUS PRN
Status: DISCONTINUED | OUTPATIENT
Start: 2021-11-08 | End: 2021-11-08 | Stop reason: HOSPADM

## 2021-11-08 RX ORDER — SODIUM CHLORIDE 0.9 % (FLUSH) 0.9 %
10 SYRINGE (ML) INJECTION EVERY 12 HOURS SCHEDULED
Status: DISCONTINUED | OUTPATIENT
Start: 2021-11-08 | End: 2021-11-08 | Stop reason: HOSPADM

## 2021-11-08 RX ORDER — SODIUM CHLORIDE, SODIUM LACTATE, POTASSIUM CHLORIDE, CALCIUM CHLORIDE 600; 310; 30; 20 MG/100ML; MG/100ML; MG/100ML; MG/100ML
INJECTION, SOLUTION INTRAVENOUS CONTINUOUS PRN
Status: DISCONTINUED | OUTPATIENT
Start: 2021-11-08 | End: 2021-11-08 | Stop reason: SDUPTHER

## 2021-11-08 RX ORDER — SODIUM CHLORIDE 9 MG/ML
INJECTION, SOLUTION INTRAVENOUS CONTINUOUS
Status: DISCONTINUED | OUTPATIENT
Start: 2021-11-09 | End: 2021-11-08

## 2021-11-08 RX ORDER — LIDOCAINE HYDROCHLORIDE 20 MG/ML
INJECTION, SOLUTION INFILTRATION; PERINEURAL PRN
Status: DISCONTINUED | OUTPATIENT
Start: 2021-11-08 | End: 2021-11-08 | Stop reason: SDUPTHER

## 2021-11-08 RX ORDER — SODIUM CHLORIDE 0.9 % (FLUSH) 0.9 %
10 SYRINGE (ML) INJECTION PRN
Status: DISCONTINUED | OUTPATIENT
Start: 2021-11-08 | End: 2021-11-08 | Stop reason: HOSPADM

## 2021-11-08 RX ORDER — SODIUM CHLORIDE, SODIUM LACTATE, POTASSIUM CHLORIDE, CALCIUM CHLORIDE 600; 310; 30; 20 MG/100ML; MG/100ML; MG/100ML; MG/100ML
INJECTION, SOLUTION INTRAVENOUS CONTINUOUS
Status: DISCONTINUED | OUTPATIENT
Start: 2021-11-09 | End: 2021-11-08 | Stop reason: HOSPADM

## 2021-11-08 RX ORDER — PROPOFOL 10 MG/ML
INJECTION, EMULSION INTRAVENOUS PRN
Status: DISCONTINUED | OUTPATIENT
Start: 2021-11-08 | End: 2021-11-08 | Stop reason: SDUPTHER

## 2021-11-08 RX ADMIN — LIDOCAINE HYDROCHLORIDE 50 MG: 20 INJECTION, SOLUTION INFILTRATION; PERINEURAL at 11:00

## 2021-11-08 RX ADMIN — PROPOFOL 60 MG: 10 INJECTION, EMULSION INTRAVENOUS at 11:13

## 2021-11-08 RX ADMIN — PROPOFOL 60 MG: 10 INJECTION, EMULSION INTRAVENOUS at 11:07

## 2021-11-08 RX ADMIN — PROPOFOL 70 MG: 10 INJECTION, EMULSION INTRAVENOUS at 11:00

## 2021-11-08 RX ADMIN — PROPOFOL 60 MG: 10 INJECTION, EMULSION INTRAVENOUS at 11:21

## 2021-11-08 RX ADMIN — SODIUM CHLORIDE, POTASSIUM CHLORIDE, SODIUM LACTATE AND CALCIUM CHLORIDE: 600; 310; 30; 20 INJECTION, SOLUTION INTRAVENOUS at 09:45

## 2021-11-08 RX ADMIN — SODIUM CHLORIDE, POTASSIUM CHLORIDE, SODIUM LACTATE AND CALCIUM CHLORIDE: 600; 310; 30; 20 INJECTION, SOLUTION INTRAVENOUS at 11:00

## 2021-11-08 RX ADMIN — PROPOFOL 60 MG: 10 INJECTION, EMULSION INTRAVENOUS at 11:27

## 2021-11-08 ASSESSMENT — PULMONARY FUNCTION TESTS
PIF_VALUE: 1

## 2021-11-08 ASSESSMENT — PAIN SCALES - GENERAL: PAINLEVEL_OUTOF10: 0

## 2021-11-08 ASSESSMENT — PAIN - FUNCTIONAL ASSESSMENT: PAIN_FUNCTIONAL_ASSESSMENT: 0-10

## 2021-11-08 NOTE — OP NOTE
PROCEDURE NOTE    DATE OF PROCEDURE: 11/8/2021    SURGEON: Nichole Farris MD    ASSISTANT: None    PREOPERATIVE DIAGNOSIS: ANEMIA  SCREENING      POSTOPERATIVE DIAGNOSIS: as described below    OPERATION: Total colonoscopy     ANESTHESIA: MAC PER ANESTHESIA     ESTIMATED BLOOD LOSS: less than 50     COMPLICATIONS: None. SPECIMENS:  Was Not Obtained    HISTORY: The patient is a 66y.o. year old female with history of above preop diagnosis. I recommended colonoscopy with possible biopsy or polypectomy and I explained the risk, benefits, expected outcome, and alternatives to the procedure. Risks included but are not limited to bleeding, infection, respiratory distress, hypotension, and perforation of the colon and possibility of missing a lesion. The patient understands and is in agreement. PROCEDURE: The patient was given IV conscious sedation. The patient's SPO2 remained above 90% throughout the procedure. The colonoscope was inserted per rectum and advanced under direct vision to the cecum with difficulty. The prep was fair. RETAINED PASTY STOOLS  REDUNDANT FLOPPY COLON     Findings:  Terminal ileum: not examined    Cecum/Ascending colon: abnormal: DIFFICULT ANATOMY SEC TO SOME FLOPPINESS AND REDUNDANCY   ALL THE LANDMARKS FOR CECUM WERE NOT VISUALIZED  HOW EVER NO FURTHER LUMEN WAS NOTED BEYOND IT  LIGHT WAS IDENTIFIED RLQ  NO GROSS PATHOLOGY WAS NOTED    Transverse colon: normal    Descending/Sigmoid colon: abnormal: DIVERTICULOSIS AND RETAINED STOOLS    Rectum/Anus: examined in normal and retroflexed positions and was abnormal: INTERNAL HEMORRHOIDS    Withdrawal Time was (minutes): 17    The colon was decompressed and the scope was removed. The patient tolerated the procedure well. Recommendations/Plan:   1. Lifestyle and dietary modifications as discussed  2. F/U HGB  3. F/U In Office in 3-4 weeks  4. Discussed with the family  5. Colonoscopy Recall :3 year  6.  POST SEDATION PATIENT WAS STABLE WITH STABLE VITAL SIGNS AND OXYGEN SATURATIONS AND WAS DISCHARGED HOME WITH RIDE IN A STABLE CONDITION.     Electronically signed by Tyler Orozco MD  on 11/8/2021 at 11:33 AM

## 2021-11-08 NOTE — ANESTHESIA PRE PROCEDURE
Department of Anesthesiology  Preprocedure Note       Name:  Lakesha Laws   Age:  66 y.o.  :  1950                                          MRN:  [id number]         Date:  2021      Surgeon: Noah Justice):  Yun Lozano MD    Procedure: Procedure(s):  COLONOSCOPY DIAGNOSTIC  EGD ESOPHAGOGASTRODUODENOSCOPY WITH BIOPSIES    Medications prior to admission:   Prior to Admission medications    Medication Sig Start Date End Date Taking? Authorizing Provider   gabapentin (NEURONTIN) 300 MG capsule Take 1 capsule by mouth 3 times daily.  10/20/21 10/20/22 Yes Chip Ren MD   nystatin (MYCOSTATIN) 533843 UNIT/GM ointment APPLY TO THE AFFECTED AREA(S) TOPICALLY TWICE DAILY 10/20/21 10/20/22 Yes Chip Rne MD   Cholecalciferol (VITAMIN D3) 50 MCG ( UT) CAPS Take by mouth daily   Yes Historical Provider, MD   traZODone (DESYREL) 100 MG tablet Take 1-2 tablets by mouth nightly 9/27/21 3/26/22 Yes Chip Ren MD   atorvastatin (LIPITOR) 80 MG tablet Take 1 tablet by mouth daily 21 Yes Chip Ren MD   losartan (COZAAR) 100 MG tablet Take 1 tablet by mouth daily 21 Yes Chip Ren MD   FLUoxetine (PROZAC) 40 MG capsule Take 1 capsule by mouth daily 21 Yes Christina Neely APRN - NP   metFORMIN (GLUCOPHAGE) 500 MG tablet Take 500 mg by mouth 2 times daily (with meals)   Yes Historical Provider, MD   glimepiride (AMARYL) 4 MG tablet Take 1 tablet by mouth every morning (before breakfast)  14  Yes Historical Provider, MD   omeprazole (PRILOSEC) 20 MG capsule Take 20 mg by mouth nightly Indications: GERD-Related Laryngitis    Yes Historical Provider, MD   tiZANidine (ZANAFLEX) 2 MG tablet Take 1 tablet by mouth every 8 hours as needed (muscle spasms) 10/20/21 10/20/22  Chip Ren MD   Handicap Placard MISC by Does not apply route Expires 3/26/2026 3/26/21   Chip Ren MD oxyCODONE-acetaminophen (PERCOCET) 5-325 MG per tablet Take 2 tablets by mouth every 4 hours as needed (spondylisis) for up to 30 days. Patient not taking: Reported on 9/27/2021 8/22/19 11/13/19  Odalys Wu MD   BD INSULIN SYRINGE ULTRAFINE 31G X 15/64\" 0.5 ML MISC  4/21/17   Historical Provider, MD   NOVOLIN 70/30 (70-30) 100 UNIT/ML injection vial Inject into the skin 2 times daily (with meals) 18 in the AM 18 at night 12/15/16   Historical Provider, MD   [address] [place] [address]  9/28/16   Historical Provider, MD   aspirin 81 MG tablet Take 1 tablet by mouth nightly 5/29/16   Boris Bautista DO   B-D ULTRAFINE III SHORT PEN 31G X 8 MM MISC  1/23/14   Historical Provider, MD   albuterol (PROVENTIL) (2.5 MG/3ML) 0.083% nebulizer solution Take 2.5 mg by nebulization every 4 hours as needed for Wheezing     Laina Lai MD       Current medications:    Current Facility-Administered Medications   Medication Dose Route Frequency Provider Last Rate Last Vanetta Phlegm ON 11/9/2021] lactated ringers infusion   IntraVENous Continuous Berneice Cee,  mL/hr at 11/08/21 0945 New Bag at 11/08/21 0945    sodium chloride flush 0.9 % injection 10 mL  10 mL IntraVENous 2 times per day Getachew Mcclellan DO        sodium chloride flush 0.9 % injection 10 mL  10 mL IntraVENous PRN Berneice Cee, DO        0.9 % sodium chloride infusion  25 mL IntraVENous PRN Berneice Cee, DO        [START ON 11/9/2021] lidocaine PF 1 % injection 1 mL  1 mL IntraDERmal Once PRN Berneice Cee, DO           Allergies:     Allergies   Allergen Reactions    Demerol Hcl [Meperidine] Nausea And Vomiting    Victoza [Liraglutide] Other (See Comments)     Pancriatitis       Problem List:    Patient Active Problem List   Diagnosis Code    Restless leg syndrome G25.81    KRISHNA on CPAP G47.33, Z99.89    Diabetes mellitus type 2 with complications ([facility] E27.5    Hyperlipidemia with target LDL less than 100 E78.5    Asthma J45.909    Allergic rhinitis J30.9    Essential hypertension I10    Major depression F32.9    Anxiety F41.9    Spondylisthesis M43.10    S/P lumbar fusion Z98.1    Gastroesophageal reflux disease K21.9    Diabetic nephropathy associated with type 2 diabetes mellitus ([facility]) E11.21    Moderate single current episode of major depressive disorder ([facility]) F32.1    Irritable bowel syndrome with diarrhea K58.0    Morbid obesity with BMI of 40.0-44.9, adult ([facility]) E66.01, Z68.41    Chronic kidney disease, stage III (moderate) ([facility]) N18.30    Diabetic polyneuropathy associated with type 2 diabetes mellitus ([facility]) E11.42    B12 deficiency E53.8    Central sleep apnea G47.31    GERD (gastroesophageal reflux disease) K21.9    Dysphagia R13.10    Anemia due to stage 3 chronic kidney disease ([facility]) N18.30, D63.1    Abdominal pain, epigastric R10.13    Moderate obesity E66.8    Severe obesity (BMI 35.0-35.9 with comorbidity) ([facility]) E66.01, Z68.35    Absolute anemia D64.9    Screening for colorectal cancer Z12.11, Z12.12       Past Medical History:        Diagnosis Date    Allergic rhinitis 4/25/2014    Anemia due to stage 3 chronic kidney disease ([address]     Anxiety 6/1/2015    on Ativan PRN    Arthritis     Asthma     on Inhaler    Chronic back pain     lumbar spondylolisthesis, stenosis    Chronic kidney disease     Diabetic nephropathy associated with type 2 diabetes mellitus ([address] 2/7/2017    Diabetic polyneuropathy associated with type 2 diabetes mellitus ([address] 2/14/2020    Dysphagia     Fibromyalgia     GERD (gastroesophageal reflux disease)     on Zantac & Omeprazole    Hearing loss     Hearing loss     LEFT, no hearing aid    History of anemia     Hyperlipidemia 2006    on Meds    Hypertension 2006    on Meds    Irritable bowel syndrome with diarrhea 3/7/2018    Major depression 6/1/2015    Moderate single current episode of major depressive disorder ([address] 11/4/2017    Murmur     Obesity     KRISHNA on CPAP     pulmonologist= Dr. Sakshi Quick Osteoarthritis     Restless leg syndrome     Screening for colorectal cancer     Tremor     essential bilateral hand tremor, worse in a.m.  Type II or unspecified type diabetes mellitus without mention of complication, not stated as uncontrolled 2006    on Insulin & Meds    Wears glasses        Past Surgical History:        Procedure Laterality Date    APPENDECTOMY  2002    BACK SURGERY  2016    LUMBAR FUSION L4-5    CARDIOVASCULAR STRESS TEST  3/2/16     SECTION  1975    CHOLECYSTECTOMY  1998    COLONOSCOPY      x2    COLONOSCOPY  2021    KNEE ARTHROPLASTY Right 2015    OTHER SURGICAL HISTORY  2013    Cortisone Injection  to Rt. Knee & Rt.  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                                Counseling given: Not Answered      Vital Signs (Current):   Vitals:    21 0926 21 0944 21 1139 21 1145   BP: (!) 147/79  (!) 155/75 (!) 165/73   Pulse: 76  64 64   Resp: 18  11 20   Temp: 97.9 °F (36.6 °C)  97.5 °F (36.4 °C)    TempSrc: Temporal  Temporal    SpO2: 99%  100% 100%   Weight:  219 lb 1.6 oz (99.4 kg)     Height:  5' 3\" (1.6 m)                                                BP Readings from Last 3 Encounters:   21 (!) 165/73   21 139/62   21 122/80       NPO Status: Time of last liquid consumption:                         Time of last solid consumption:                         Date of last liquid consumption: 21                        Date of last solid food consumption: 11/05/21    BMI:   Wt Readings from Last 3 Encounters:   11/08/21 219 lb 1.6 oz (99.4 kg)   09/27/21 224 lb (101.6 kg)   08/20/21 228 lb (103.4 kg)     Body mass index is 38.81 kg/m². CBC:   Lab Results   Component Value Date    WBC 9.4 07/01/2021    RBC 3.51 07/01/2021    RBC 3.60 02/28/2012    HGB 9.3 07/01/2021    HCT 32.3 07/01/2021    MCV 92.0 07/01/2021    RDW 15.9 07/01/2021     07/01/2021     02/28/2012       CMP:   Lab Results   Component Value Date     07/01/2021     07/01/2021    K 5.4 07/01/2021    K 5.4 07/01/2021     07/01/2021     07/01/2021    CO2 23 07/01/2021    CO2 23 07/01/2021    BUN 21 07/01/2021    BUN 21 07/01/2021    CREATININE 1.31 07/01/2021    CREATININE 1.31 07/01/2021    GFRAA 48 07/01/2021    GFRAA 48 07/01/2021    LABGLOM 40 07/01/2021    LABGLOM 40 07/01/2021    GLUCOSE 124 07/01/2021    GLUCOSE 124 07/01/2021    GLUCOSE 286 02/28/2012    PROT 6.2 07/01/2021    PROT 6.2 07/01/2021    CALCIUM 9.1 07/01/2021    CALCIUM 9.1 07/01/2021    BILITOT 0.28 07/01/2021    BILITOT 0.28 07/01/2021    ALKPHOS 160 07/01/2021    ALKPHOS 160 07/01/2021    AST 20 07/01/2021    AST 20 07/01/2021    ALT 19 07/01/2021    ALT 19 07/01/2021       POC Tests: No results for input(s): POCGLU, POCNA, POCK, POCCL, POCBUN, POCHEMO, POCHCT in the last 72 hours.     Coags:   Lab Results   Component Value Date    PROTIME 10.2 01/28/2015    PROTIME 10.4 02/28/2012    INR 1.0 01/28/2015    APTT 26.5 02/28/2012       HCG (If Applicable): No results found for: PREGTESTUR, PREGSERUM, HCG, HCGQUANT     ABGs: No results found for: PHART, PO2ART, MNL2LMO, XCE8QMO, BEART, B4XUNRFJ     Type & Screen (If Applicable):  No results found for: LABABO, LABRH    Drug/Infectious Status (If Applicable):  Lab Results   Component Value Date    HEPCAB NONREACTIVE 12/28/2016       COVID-19 Screening (If Applicable): No results found for: COVID19        Anesthesia Evaluation  Patient summary reviewed and Nursing notes reviewed no history of anesthetic complications:   Airway: Mallampati: II  TM distance: >3 FB   Neck ROM: full  Mouth opening: > = 3 FB Dental: normal exam         Pulmonary:normal exam    (+) sleep apnea:  asthma:                            Cardiovascular:  Exercise tolerance: no interval change,   (+) hypertension:,     (-) past MI, CAD and CABG/stent        Rate: normal                    Neuro/Psych:   (+) psychiatric history:            GI/Hepatic/Renal:   (+) GERD:,           Endo/Other:    (+) Diabetes, . Abdominal:   (+) obese,           Vascular: Other Findings:             Anesthesia Plan      MAC and general     ASA 3       Induction: intravenous. MIPS: prophylactic pharmacologic antiemetic agents not administered perioperatively for documented reasons. Anesthetic plan and risks discussed with patient. Plan discussed with CRNA.     Attending anesthesiologist reviewed and agrees with Preprocedure content              Mehreen Herndon DO   11/8/2021

## 2021-11-08 NOTE — OP NOTE
PROCEDURE NOTE    DATE OF PROCEDURE: 11/8/2021     SURGEON: Meche Herrmann MD    ASSISTANT: None    PREOPERATIVE DIAGNOSIS: ANEMIA  GERD  ABD PAINS    POSTOPERATIVE DIAGNOSIS: As described below    OPERATION: Upper GI endoscopy with Biopsy    ANESTHESIA: MAC PER ANESTHESIA     ESTIMATED BLOOD LOSS: Less than 50 ml    COMPLICATIONS: None. SPECIMENS:  Was Obtained:     HISTORY: The patient is a 66y.o. year old female with history of above preop diagnosis. I recommended esophagogastroduodenoscopy with possible biopsy and I explained the risk, benefits, expected outcome, and alternatives to the procedure. Risks included but are not limited to bleeding, infection, respiratory distress, hypotension, and perforation of the esophagus, stomach, or duodenum. Patient understands and is in agreement. PROCEDURE: The patient was given IV conscious sedation. The patient's SPO2 remained above 90% throughout the procedure. The gastroscope was inserted orally and advanced under direct vision through the esophagus, through the stomach, through the pylorus, and into the descending duodenum. Findings:    Retropharyngeal area was grossly normal appearing    Esophagus: abnormal: MILD IRREGULAR SJC WAS BIOPSIED  SMALL ONE TO TWO CM HIATAL HERNIA    Stomach:    Fundus: normal    Body: normal    Antrum: abnormal: MILD GASTRITIS WAS BIOPSIED     Duodenum:     Descending: normal RANDOM BIOPSIES    Bulb: normal    The scope was removed and the patient tolerated the procedure well. Recommendations/Plan:   1. F/U Biopsies  2. F/U In Office in 3-4 weeks  3. Discussed with the family  4.  Post sedation patient was stable with stable vital signs and stable O2 saturations    Electronically signed by Meche Herrmann MD  on 11/8/2021 at 11:07 AM

## 2021-11-08 NOTE — H&P
depression 2015    Moderate single current episode of major depressive disorder ([address] 2017    Obesity     KRISHNA on CPAP     pulmonologist= Dr. Barbara Morocho Osteoarthritis     Restless leg syndrome     Screening for colorectal cancer     Tremor     essential bilateral hand tremor, worse in a.m.  Type II or unspecified type diabetes mellitus without mention of complication, not stated as uncontrolled     on Insulin & Meds    Wears glasses         Past Surgical History:     Past Surgical History:   Procedure Laterality Date    APPENDECTOMY  2002    BACK SURGERY  2016    LUMBAR FUSION L4-5    CARDIOVASCULAR STRESS TEST  3/2/16     SECTION  1975    CHOLECYSTECTOMY  1998    COLONOSCOPY      x2    KNEE ARTHROPLASTY Right 2015    OTHER SURGICAL HISTORY  2013    Cortisone Injection  to Rt. Knee & Rt. Wrist    TONSILLECTOMY AND ADENOIDECTOMY      TRANSTHORACIC ECHOCARDIOGRAM  16    UPPER GASTROINTESTINAL ENDOSCOPY          Medications Prior to Admission:     Prior to Admission medications    Medication Sig Start Date End Date Taking? Authorizing Provider   gabapentin (NEURONTIN) 300 MG capsule Take 1 capsule by mouth 3 times daily.  10/20/21 10/20/22  Kesha Fischer MD   nystatin (MYCOSTATIN) 965878 UNIT/GM ointment APPLY TO THE AFFECTED AREA(S) TOPICALLY TWICE DAILY 10/20/21 10/20/22  Akira Batista MD   tiZANidine (ZANAFLEX) 2 MG tablet Take 1 tablet by mouth every 8 hours as needed (muscle spasms) 10/20/21 10/20/22  Akira Batista MD   Cholecalciferol (VITAMIN D3) 50 MCG ( UT) CAPS Take by mouth daily    Historical Provider, MD   traZODone (DESYREL) 100 MG tablet Take 1-2 tablets by mouth nightly 9/27/21 3/26/22  Akira Batista MD   atorvastatin (LIPITOR) 80 MG tablet Take 1 tablet by mouth daily 21  Akira Batista MD   losartan (COZAAR) 100 MG tablet Take 1 tablet by mouth daily 21 reactive, extraocular eye movements intact, conjunctiva clear  Ear: Hx hearing loss normal external ear, no discharge, hearing intact  Nose:  no drainage noted  Mouth: mucous membranes moist  Neck: supple, right carotid bruit vs heart murmur , thyroid not palpable  Lungs: Bilateral equal air entry, clear to ausculation, no wheezing, rales or rhonchi, normal effort  Cardiovascular: HR 76 normal rate, regular rhythm, soft systolic heart murmur, gallop, rub. Abdomen: round, obese, soft, nontender, nondistended, normal bowel sounds, no hepatomegaly or splenomegaly  Neurologic: There are no new focal motor or sensory deficits, normal muscle tone and bulk, no abnormal sensation, normal speech, cranial nerves II through XII grossly intact  Skin: No gross lesions, rashes, bruising or bleeding on exposed skin area  Extremities:  peripheral pulses palpable, no pedal edema or calf pain with palpation  Psych: normal affect     Investigations:      Laboratory Testing:  No results for input(s): COVID19 in the last 720 hours. No results for input(s): POCGLU in the last 72 hours. No results found for this or any previous visit (from the past 24 hour(s)). No results for input(s): HGB, HCT, WBC, MCV, PLATELET, NA, K, CL, CO2, BUN, CREATININE, GLUCOSE, INR, PROTIME, APTT, AST, ALT, LABALBU, HCG in the last 720 hours. Imaging/Diagnostics:    Diagnosis:      1. GERD, IBS with diarrhea     Plans:     1.  Diagnostic colonoscopy and EGD      CHELSIE Espinoza CNP  11/8/2021  9:26 AM

## 2021-11-10 LAB — SURGICAL PATHOLOGY REPORT: NORMAL

## 2021-12-18 DIAGNOSIS — B37.2 YEAST DERMATITIS: ICD-10-CM

## 2021-12-18 DIAGNOSIS — F32.0 CURRENT MILD EPISODE OF MAJOR DEPRESSIVE DISORDER WITHOUT PRIOR EPISODE (HCC): Primary | ICD-10-CM

## 2021-12-20 RX ORDER — NYSTATIN 100000 U/G
OINTMENT TOPICAL
Qty: 90 G | Refills: 0 | Status: SHIPPED | OUTPATIENT
Start: 2021-12-20 | End: 2022-04-04

## 2021-12-20 RX ORDER — FLUOXETINE HYDROCHLORIDE 40 MG/1
40 CAPSULE ORAL DAILY
Qty: 90 CAPSULE | Refills: 0 | Status: SHIPPED | OUTPATIENT
Start: 2021-12-20 | End: 2022-04-04

## 2021-12-20 NOTE — TELEPHONE ENCOUNTER
LOV 8-20-21  LRF 10-20-21    Health Maintenance   Topic Date Due    DTaP/Tdap/Td vaccine (1 - Tdap) Never done    Shingles Vaccine (2 of 3) 09/28/2012    Flu vaccine (1) 09/01/2021    COVID-19 Vaccine (3 - Booster for Moderna series) 10/14/2021    Breast cancer screen  12/02/2021    Diabetic foot exam  12/30/2021    A1C test (Diabetic or Prediabetic)  07/01/2022    Lipid screen  07/01/2022    Potassium monitoring  07/01/2022    Creatinine monitoring  07/01/2022    Annual Wellness Visit (AWV)  07/09/2022    Diabetic retinal exam  10/28/2022    Colon cancer screen colonoscopy  11/08/2031    Pneumococcal 65+ years Vaccine  Completed    Hepatitis C screen  Completed    DEXA (modify frequency per FRAX score)  Addressed    Hepatitis A vaccine  Aged Out    Hib vaccine  Aged Out    Meningococcal (ACWY) vaccine  Aged Out             (applicable per patient's age: Cancer Screenings, Depression Screening, Fall Risk Screening, Immunizations)    Hemoglobin A1C (%)   Date Value   07/01/2021 7.1 (H)   12/29/2020 6.9 (H)   08/17/2020 7.4 (H)     Microalb/Crt.  Ratio (mcg/mg creat)   Date Value   07/01/2021 CANNOT BE CALCULATED     LDL Cholesterol (mg/dL)   Date Value   07/01/2021 63     LDL Calculated (mg/dL)   Date Value   07/29/2013 82     AST (U/L)   Date Value   07/01/2021 20   07/01/2021 20     ALT (U/L)   Date Value   07/01/2021 19   07/01/2021 19     BUN (mg/dL)   Date Value   07/01/2021 21   07/01/2021 21      (goal A1C is < 7)   (goal LDL is <100) need 30-50% reduction from baseline     BP Readings from Last 3 Encounters:   11/08/21 (!) 145/94   11/08/21 (!) 185/90   09/27/21 139/62    (goal /80)      All Future Testing planned in [facility]:  Lab Frequency Next Occurrence   DEXA BONE DENSITY 2 SITES Once 03/26/2021   Potassium Once 07/01/2022   Comprehensive Metabolic Panel, Fasting Once 10/15/2021   Vitamin B12 Once 10/15/2021   CBC Once 10/15/2021   Iron And TIBC Once 10/15/2021   Ferritin Once 10/15/2021   Comprehensive Metabolic Panel, Fasting Once 11/05/2021   Vitamin B12 Once 11/05/2021   CBC With Auto Differential Once 11/05/2021   Iron And TIBC Once 11/05/2021   Ferritin Once 11/05/2021   Potassium Once 07/01/2022   PIETRO GARO DIGITAL SCREEN BILATERAL Once 11/24/2021   EGD Once 12/27/2021   COLONOSCOPY W/ OR W/O BIOPSY Once 12/28/2021       Next Visit Date:  Future Appointments   Date Time Provider Eva Dent   12/29/2021  1:45 PM MD Sabas Beauchamp [facility] Feroz Alcazar            Patient Active Problem List:     Restless leg syndrome     KRISHNA on CPAP     Diabetes mellitus type 2 with complications ([address]     Hyperlipidemia with target LDL less than 100     Asthma     Allergic rhinitis     Essential hypertension     Major depression     Anxiety     Spondylisthesis     S/P lumbar fusion     Gastroesophageal reflux disease     Diabetic nephropathy associated with type 2 diabetes mellitus (HCC)     Moderate single current episode of major depressive disorder ([address]     Irritable bowel syndrome with diarrhea     Morbid obesity with BMI of 40.0-44.9, adult (HCC)     Chronic kidney disease, stage III (moderate) (HCC)     Diabetic polyneuropathy associated with type 2 diabetes mellitus ([address]     B12 deficiency     Central sleep apnea     GERD (gastroesophageal reflux disease)     Dysphagia     Anemia due to stage 3 chronic kidney disease (HCC)     Abdominal pain, epigastric     Moderate obesity     Severe obesity (BMI 35.0-35.9 with comorbidity) (HCC)     Absolute anemia

## 2021-12-20 NOTE — TELEPHONE ENCOUNTER
LOV 8-20-21  LRF 7-30-21    Health Maintenance   Topic Date Due    DTaP/Tdap/Td vaccine (1 - Tdap) Never done    Shingles Vaccine (2 of 3) 09/28/2012    Flu vaccine (1) 09/01/2021    COVID-19 Vaccine (3 - Booster for Moderna series) 10/14/2021    Breast cancer screen  12/02/2021    Diabetic foot exam  12/30/2021    A1C test (Diabetic or Prediabetic)  07/01/2022    Lipid screen  07/01/2022    Potassium monitoring  07/01/2022    Creatinine monitoring  07/01/2022    Annual Wellness Visit (AWV)  07/09/2022    Diabetic retinal exam  10/28/2022    Colon cancer screen colonoscopy  11/08/2031    Pneumococcal 65+ years Vaccine  Completed    Hepatitis C screen  Completed    DEXA (modify frequency per FRAX score)  Addressed    Hepatitis A vaccine  Aged Out    Hib vaccine  Aged Out    Meningococcal (ACWY) vaccine  Aged Out             (applicable per patient's age: Cancer Screenings, Depression Screening, Fall Risk Screening, Immunizations)    Hemoglobin A1C (%)   Date Value   07/01/2021 7.1 (H)   12/29/2020 6.9 (H)   08/17/2020 7.4 (H)     Microalb/Crt.  Ratio (mcg/mg creat)   Date Value   07/01/2021 CANNOT BE CALCULATED     LDL Cholesterol (mg/dL)   Date Value   07/01/2021 63     LDL Calculated (mg/dL)   Date Value   07/29/2013 82     AST (U/L)   Date Value   07/01/2021 20   07/01/2021 20     ALT (U/L)   Date Value   07/01/2021 19   07/01/2021 19     BUN (mg/dL)   Date Value   07/01/2021 21   07/01/2021 21      (goal A1C is < 7)   (goal LDL is <100) need 30-50% reduction from baseline     BP Readings from Last 3 Encounters:   11/08/21 (!) 145/94   11/08/21 (!) 185/90   09/27/21 139/62    (goal /80)      All Future Testing planned in [facility]:  Lab Frequency Next Occurrence   DEXA BONE DENSITY 2 SITES Once 03/26/2021   Potassium Once 07/01/2022   Comprehensive Metabolic Panel, Fasting Once 10/15/2021   Vitamin B12 Once 10/15/2021   CBC Once 10/15/2021   Iron And TIBC Once 10/15/2021   Ferritin Once 10/15/2021   Comprehensive Metabolic Panel, Fasting Once 11/05/2021   Vitamin B12 Once 11/05/2021   CBC With Auto Differential Once 11/05/2021   Iron And TIBC Once 11/05/2021   Ferritin Once 11/05/2021   Potassium Once 07/01/2022   PIETRO GARO DIGITAL SCREEN BILATERAL Once 11/24/2021   EGD Once 12/27/2021   COLONOSCOPY W/ OR W/O BIOPSY Once 12/28/2021       Next Visit Date:  Future Appointments   Date Time Provider Eva Dent   12/29/2021  1:45 PM MD Ruba Gonzalez [facility] [address] [facility]            Patient Active Problem List:     Restless leg syndrome     KRISHNA on CPAP     Diabetes mellitus type 2 with complications ([address]     Hyperlipidemia with target LDL less than 100     Asthma     Allergic rhinitis     Essential hypertension     Major depression     Anxiety     Spondylisthesis     S/P lumbar fusion     Gastroesophageal reflux disease     Diabetic nephropathy associated with type 2 diabetes mellitus (HCC)     Moderate single current episode of major depressive disorder ([address]     Irritable bowel syndrome with diarrhea     Morbid obesity with BMI of 40.0-44.9, adult (HCC)     Chronic kidney disease, stage III (moderate) (HCC)     Diabetic polyneuropathy associated with type 2 diabetes mellitus ([address]     B12 deficiency     Central sleep apnea     GERD (gastroesophageal reflux disease)     Dysphagia     Anemia due to stage 3 chronic kidney disease (HCC)     Abdominal pain, epigastric     Moderate obesity     Severe obesity (BMI 35.0-35.9 with comorbidity) (HCC)     Absolute anemia

## 2021-12-29 ENCOUNTER — OFFICE VISIT (OUTPATIENT)
Dept: FAMILY MEDICINE CLINIC | Age: 71
End: 2021-12-29
Payer: MEDICARE

## 2021-12-29 VITALS
WEIGHT: 222 LBS | HEIGHT: 63 IN | DIASTOLIC BLOOD PRESSURE: 82 MMHG | BODY MASS INDEX: 39.34 KG/M2 | SYSTOLIC BLOOD PRESSURE: 132 MMHG

## 2021-12-29 DIAGNOSIS — M15.9 GENERALIZED OSTEOARTHRITIS: ICD-10-CM

## 2021-12-29 DIAGNOSIS — M17.12 PRIMARY OSTEOARTHRITIS OF LEFT KNEE: ICD-10-CM

## 2021-12-29 DIAGNOSIS — D63.1 ANEMIA DUE TO STAGE 3 CHRONIC KIDNEY DISEASE, UNSPECIFIED WHETHER STAGE 3A OR 3B CKD (HCC): ICD-10-CM

## 2021-12-29 DIAGNOSIS — N18.30 ANEMIA DUE TO STAGE 3 CHRONIC KIDNEY DISEASE, UNSPECIFIED WHETHER STAGE 3A OR 3B CKD (HCC): ICD-10-CM

## 2021-12-29 DIAGNOSIS — E11.21 DIABETIC NEPHROPATHY ASSOCIATED WITH TYPE 2 DIABETES MELLITUS (HCC): ICD-10-CM

## 2021-12-29 DIAGNOSIS — J45.20 MILD INTERMITTENT ASTHMA WITHOUT COMPLICATION: ICD-10-CM

## 2021-12-29 DIAGNOSIS — F32.1 MODERATE SINGLE CURRENT EPISODE OF MAJOR DEPRESSIVE DISORDER (HCC): ICD-10-CM

## 2021-12-29 DIAGNOSIS — E78.5 HYPERLIPIDEMIA WITH TARGET LDL LESS THAN 100: ICD-10-CM

## 2021-12-29 DIAGNOSIS — I10 ESSENTIAL HYPERTENSION: Primary | ICD-10-CM

## 2021-12-29 DIAGNOSIS — J30.1 CHRONIC SEASONAL ALLERGIC RHINITIS DUE TO POLLEN: ICD-10-CM

## 2021-12-29 DIAGNOSIS — Z98.1 S/P LUMBAR FUSION: ICD-10-CM

## 2021-12-29 DIAGNOSIS — E53.8 B12 DEFICIENCY: ICD-10-CM

## 2021-12-29 DIAGNOSIS — F41.9 ANXIETY: ICD-10-CM

## 2021-12-29 DIAGNOSIS — D64.9 ANEMIA, UNSPECIFIED TYPE: ICD-10-CM

## 2021-12-29 DIAGNOSIS — G89.29 CHRONIC PAIN OF BOTH SHOULDERS: ICD-10-CM

## 2021-12-29 DIAGNOSIS — E11.8 DIABETES MELLITUS TYPE 2 WITH COMPLICATIONS (HCC): ICD-10-CM

## 2021-12-29 DIAGNOSIS — N18.30 STAGE 3 CHRONIC KIDNEY DISEASE, UNSPECIFIED WHETHER STAGE 3A OR 3B CKD (HCC): ICD-10-CM

## 2021-12-29 DIAGNOSIS — G25.81 RESTLESS LEG SYNDROME: ICD-10-CM

## 2021-12-29 DIAGNOSIS — Z91.81 AT HIGH RISK FOR FALLS: ICD-10-CM

## 2021-12-29 DIAGNOSIS — R79.89 ABNORMAL THYROID BLOOD TEST: ICD-10-CM

## 2021-12-29 DIAGNOSIS — M25.551 RIGHT HIP PAIN: ICD-10-CM

## 2021-12-29 DIAGNOSIS — M25.511 CHRONIC PAIN OF BOTH SHOULDERS: ICD-10-CM

## 2021-12-29 DIAGNOSIS — E11.42 DIABETIC POLYNEUROPATHY ASSOCIATED WITH TYPE 2 DIABETES MELLITUS (HCC): ICD-10-CM

## 2021-12-29 DIAGNOSIS — M79.642 LEFT HAND PAIN: ICD-10-CM

## 2021-12-29 DIAGNOSIS — K21.9 GASTROESOPHAGEAL REFLUX DISEASE WITHOUT ESOPHAGITIS: ICD-10-CM

## 2021-12-29 DIAGNOSIS — G47.31 CENTRAL SLEEP APNEA: ICD-10-CM

## 2021-12-29 DIAGNOSIS — G47.33 OSA (OBSTRUCTIVE SLEEP APNEA): ICD-10-CM

## 2021-12-29 DIAGNOSIS — M25.512 CHRONIC PAIN OF BOTH SHOULDERS: ICD-10-CM

## 2021-12-29 PROCEDURE — 3017F COLORECTAL CA SCREEN DOC REV: CPT | Performed by: PEDIATRICS

## 2021-12-29 PROCEDURE — 3051F HG A1C>EQUAL 7.0%<8.0%: CPT | Performed by: PEDIATRICS

## 2021-12-29 PROCEDURE — 99214 OFFICE O/P EST MOD 30 MIN: CPT | Performed by: PEDIATRICS

## 2021-12-29 PROCEDURE — 1123F ACP DISCUSS/DSCN MKR DOCD: CPT | Performed by: PEDIATRICS

## 2021-12-29 PROCEDURE — 1090F PRES/ABSN URINE INCON ASSESS: CPT | Performed by: PEDIATRICS

## 2021-12-29 PROCEDURE — G8427 DOCREV CUR MEDS BY ELIG CLIN: HCPCS | Performed by: PEDIATRICS

## 2021-12-29 PROCEDURE — G8417 CALC BMI ABV UP PARAM F/U: HCPCS | Performed by: PEDIATRICS

## 2021-12-29 PROCEDURE — 0518F FALL PLAN OF CARE DOCD: CPT | Performed by: PEDIATRICS

## 2021-12-29 PROCEDURE — 2022F DILAT RTA XM EVC RTNOPTHY: CPT | Performed by: PEDIATRICS

## 2021-12-29 PROCEDURE — 4040F PNEUMOC VAC/ADMIN/RCVD: CPT | Performed by: PEDIATRICS

## 2021-12-29 PROCEDURE — G8484 FLU IMMUNIZE NO ADMIN: HCPCS | Performed by: PEDIATRICS

## 2021-12-29 PROCEDURE — 1036F TOBACCO NON-USER: CPT | Performed by: PEDIATRICS

## 2021-12-29 PROCEDURE — G8399 PT W/DXA RESULTS DOCUMENT: HCPCS | Performed by: PEDIATRICS

## 2021-12-29 PROCEDURE — 3288F FALL RISK ASSESSMENT DOCD: CPT | Performed by: PEDIATRICS

## 2021-12-29 RX ORDER — VITAMIN B COMPLEX
1 CAPSULE ORAL DAILY
COMMUNITY

## 2021-12-29 ASSESSMENT — ENCOUNTER SYMPTOMS
EYE REDNESS: 0
RHINORRHEA: 0
EYE PAIN: 0
TROUBLE SWALLOWING: 0
DIARRHEA: 1
SINUS PRESSURE: 0
BLOOD IN STOOL: 0
CHOKING: 0
CHEST TIGHTNESS: 0
EYE DISCHARGE: 0
CONSTIPATION: 0
VOMITING: 0
BACK PAIN: 1
COUGH: 0
STRIDOR: 0
SORE THROAT: 0
NAUSEA: 0
SHORTNESS OF BREATH: 0
COLOR CHANGE: 0
ABDOMINAL PAIN: 0
WHEEZING: 0

## 2021-12-29 NOTE — PROGRESS NOTES
Arnaldo Garcia (:  1950) is a 66 y.o. female,Established patient, here for evaluation of the following chief complaint(s):  Hypertension, Hyperlipidemia, and Diabetes         ASSESSMENT/PLAN:    1. Essential hypertension  2. Hyperlipidemia with target LDL less than 100  3. Mild intermittent asthma without complication  4. Chronic seasonal allergic rhinitis due to pollen  5. KRISHNA (obstructive sleep apnea)  6. Central sleep apnea  7. Restless leg syndrome  8. Gastroesophageal reflux disease without esophagitis  9. Moderate single current episode of major depressive disorder ([address]  10. Anxiety  11. S/P lumbar fusion  12. Diabetes mellitus type 2 with complications ([address]  13. Diabetic nephropathy associated with type 2 diabetes mellitus ([address]  14. Stage 3 chronic kidney disease, unspecified whether stage 3a or 3b CKD ([address]  15. Diabetic polyneuropathy associated with type 2 diabetes mellitus ([address]  16. Generalized osteoarthritis  17. Chronic pain of both shoulders  18. Right hip pain  19. Primary osteoarthritis of left knee  20. B12 deficiency  21. Abnormal thyroid blood test  22. Anemia, unspecified type  23. Anemia due to stage 3 chronic kidney disease, unspecified whether stage 3a or 3b CKD ([address]  24. Left hand pain  25. At high risk for falls    Obtain labs as ordered  Occasionally cardiology as scheduled - obtain records from Dr. Marquis Bateman with GI as scheduled  Continue Prozac  Continue Ativan only as 9 days as tolerated  Antibiotics as recommended  Follow-up with Ortho at some to the future  Mammogram  Obtain DEXA scan  Tdap and shingles vaccines recommended  Complete COVID vaccinations  Call with concerns       Return in about 3 months (around 3/29/2022) for routine follow up.          Subjective     HPI    Patient presents today for routine follow-up of her chronic medical problems including hypertension, hyperlipidemia, asthma, allergies, KRISHNA, central sleep apnea, restless leg syndrome, GERD, depression, anxiety, spondylolisthesis status post lumbar fusion. She also has a history of diabetes type 2 with chronic kidney disease and neuropathy. She has generalized osteoarthritis with pain in the shoulders, right hip and left knee, B12 deficiency, abnormal thyroid labs and history of anemia suspected to be anemia of chronic disease. Overall she states she has been doing well. Her blood pressure is well controlled with losartan. She denies any side effects from her medication. She does continue on Lipitor for her hyperlipidemia and denies any side effects from her medication. Her asthma and allergies are well controlled with Singulair. She does have obstructive sleep apnea and central sleep apnea that is now treated with a several bed. She states overall she is doing well with this. She is supposed to follow-up with her pulmonologist, Dr. Nathan Todd regularly. Restless leg syndrome is controlled with Neurontin. She does have a history of GERD that is controlled with Prilosec. She did see GI recently and and had an EGD and colonoscopy. EGD did show an irregular SGC and a small 1 to 2 cm hiatal hernia. Her colonoscopy did show diverticulosis and redundant colon. It was recommended for repeat colonoscopy in 2024. She does have a history of irritable bowel syndrome and does get intermittent diarrhea. She did start Metamucil and this has been helpful. She does have a history of depression and anxiety that is well controlled with Prozac daily and Ativan as needed. She is not using Ativan in a very long time. She did undergo a posterior lumbar interbody fusion at L4/L5 due to spondylolisthesis in 2016. She does get intermittent low back pain but this is well controlled. She does use Neurontin and tizanidine as needed. She does have a history of type 2 diabetes with chronic kidney disease and neuropathy that is followed by endocrinology, Dr. Sylvia Ward.   She does continue to take insulin 70/30 at 10 units once daily. She does continue on glimepiride and Metformin. He has only had one low blood sugar since her last visit. Her eye exam is up-to-date with Dr. Jaxon Gna.  She has seen nephrology in the past but has not seen them in some time and prefers not to see anyone if it is not necessary. She does get left knee pain secondary to osteoarthritis. She was seeing Dr. Vishnu Gill for her severe osteoarthritis and I did recommend that she should see him again if this is bothersome. She also complains of some left shoulder pain and right shoulder pain as well as right hip pain. She does have a history of B12 deficiency and takes an over-the-counter B12 supplement. She does have a history of obesity and continues to work on losing weight over time. She did lose 6 pounds since her last visit 4 months ago. She does have a history of an abnormal thyroid labs in the past.  She did have a thyroid ultrasound that showed her thyroid gland was mildly heterogeneous and diminutive. Endocrinology advised her that they will continue to monitor this over time. She have a history of anemia which is likely anemia of chronic disease due to CKD. She does have an order for a DEXA scan and a mammogram that she is going to have done at some point in the future. She does complain of left hand pain for the last several days. She is not sure she slept on this wrong. She has no other concerns at this time. cmw      Review of Systems   Constitutional: Negative for activity change, appetite change, fatigue, fever and unexpected weight change. HENT: Negative for congestion, ear pain, postnasal drip, rhinorrhea, sinus pressure, sneezing, sore throat, tinnitus and trouble swallowing. Eyes: Negative for pain, discharge, redness and visual disturbance. Respiratory: Negative for cough, choking, chest tightness, shortness of breath, wheezing and stridor. Asthma well controlled with singulair daily and albuterol as needed. KRISHNA / central sleep apnea controlled with [employer] nightly - she follows up with Dr. Yoselyn Reid. Cardiovascular: Negative for chest pain, palpitations and leg swelling. Gastrointestinal: Positive for diarrhea (secondary to irritable bowel - improved with [employer] but she does have intermittent episodes once per week). Negative for abdominal pain, blood in stool, constipation, nausea and vomiting. GERD treated with omeprazole and tums occasionally   Endocrine: Negative for polydipsia, polyphagia and polyuria. History of abnormal thyroid labs    Genitourinary: Negative for dysuria, pelvic pain and urgency. Musculoskeletal: Positive for arthralgias (right shoulder pain chronic / left shoulder pain / right hip pain / left knee pain) and back pain (Mid back pain occasionally). Negative for myalgias and neck pain. Skin: Positive for rash (only a few excoriated macules over the arms and back - c/w neurodermatitis - much improved from before ). Negative for color change and wound. Allergic/Immunologic: Negative for immunocompromised state. Neurological: Positive for tremors (shaky at times), weakness and numbness. Negative for dizziness, light-headedness and headaches. Hematological: Negative for adenopathy. Does not bruise/bleed easily. History of mild anemia   Psychiatric/Behavioral: Positive for dysphoric mood (stable with prozac) and sleep disturbance (stable with trazodone). Negative for agitation, confusion and decreased concentration. The patient is nervous/anxious (stable). Objective      Physical Exam  Vitals and nursing note reviewed. Constitutional:       General: She is not in acute distress. Appearance: Normal appearance. She is well-developed. She is obese. She is not ill-appearing, toxic-appearing or diaphoretic. HENT:      Head: Normocephalic. Right Ear: Tympanic membrane, ear canal and external ear normal. There is no impacted cerumen. Left Ear: Tympanic membrane, ear canal and external ear normal. There is no impacted cerumen. Nose: Nose normal. No congestion or rhinorrhea. Mouth/Throat:      Mouth: Mucous membranes are moist.      Pharynx: No oropharyngeal exudate. Eyes:      General: No scleral icterus. Right eye: No discharge. Left eye: No discharge. Extraocular Movements: Extraocular movements intact. Conjunctiva/sclera: Conjunctivae normal.      Pupils: Pupils are equal, round, and reactive to light. Neck:      Thyroid: No thyromegaly. Vascular: No JVD. Cardiovascular:      Rate and Rhythm: Normal rate and regular rhythm. Pulses: Normal pulses. Heart sounds: Murmur heard. Pulmonary:      Effort: Pulmonary effort is normal.      Breath sounds: Normal breath sounds. No stridor. No wheezing or rales. Abdominal:      General: Bowel sounds are normal.      Palpations: Abdomen is soft. There is no mass. Tenderness: There is no abdominal tenderness. There is no right CVA tenderness or left CVA tenderness. Musculoskeletal:      Right shoulder: Tenderness present. Decreased range of motion. Left shoulder: Tenderness present. Decreased range of motion. Cervical back: Normal range of motion and neck supple. Lumbar back: Tenderness present. Decreased range of motion. Back:       Right hip: Tenderness present. Normal strength. Left hip: Tenderness present. Normal strength. Right knee: Normal.      Left knee: Decreased range of motion. Tenderness present over the medial joint line. Right lower leg: No edema. Left lower leg: No edema. Lymphadenopathy:      Cervical: No cervical adenopathy. Skin:     General: Skin is warm. Capillary Refill: Capillary refill takes less than 2 seconds. Findings: Rash (only a few excoriated macules over the legs, arms, chest and back ) present.    Neurological:      General: No focal deficit present. Mental Status: She is alert and oriented to person, place, and time. Cranial Nerves: No cranial nerve deficit. Motor: No abnormal muscle tone. Coordination: Coordination normal.      Deep Tendon Reflexes: Reflexes normal.   Psychiatric:         Attention and Perception: Attention normal.         Mood and Affect: Mood normal. Mood is not anxious or depressed. Speech: Speech normal.         Behavior: Behavior normal.         Thought Content: Thought content normal.         Judgment: Judgment normal.              An electronic signature was used to authenticate this note. --Rebeca Bates MD   On the basis of positive falls risk screening, assessment and plan is as follows: home safety tips provided.

## 2022-02-07 ENCOUNTER — HOSPITAL ENCOUNTER (OUTPATIENT)
Age: 72
Setting detail: SPECIMEN
Discharge: HOME | End: 2022-02-07

## 2022-02-07 DIAGNOSIS — D64.9 ANEMIA, UNSPECIFIED TYPE: ICD-10-CM

## 2022-02-07 DIAGNOSIS — D63.1 ANEMIA DUE TO STAGE 3 CHRONIC KIDNEY DISEASE, UNSPECIFIED WHETHER STAGE 3A OR 3B CKD (HCC): ICD-10-CM

## 2022-02-07 DIAGNOSIS — I10 ESSENTIAL HYPERTENSION: ICD-10-CM

## 2022-02-07 DIAGNOSIS — N18.30 ANEMIA DUE TO STAGE 3 CHRONIC KIDNEY DISEASE, UNSPECIFIED WHETHER STAGE 3A OR 3B CKD (HCC): ICD-10-CM

## 2022-02-07 DIAGNOSIS — R13.19 ESOPHAGEAL DYSPHAGIA: ICD-10-CM

## 2022-02-07 DIAGNOSIS — E78.5 HYPERLIPIDEMIA WITH TARGET LDL LESS THAN 100: ICD-10-CM

## 2022-02-07 DIAGNOSIS — E11.8 DIABETES MELLITUS TYPE 2 WITH COMPLICATIONS (HCC): ICD-10-CM

## 2022-02-07 DIAGNOSIS — K58.0 IRRITABLE BOWEL SYNDROME WITH DIARRHEA: ICD-10-CM

## 2022-02-07 DIAGNOSIS — K21.9 GASTROESOPHAGEAL REFLUX DISEASE, UNSPECIFIED WHETHER ESOPHAGITIS PRESENT: ICD-10-CM

## 2022-02-07 DIAGNOSIS — R10.13 ABDOMINAL PAIN, EPIGASTRIC: ICD-10-CM

## 2022-02-07 DIAGNOSIS — E87.6 HYPOKALEMIA: ICD-10-CM

## 2022-02-07 DIAGNOSIS — E53.8 B12 DEFICIENCY: ICD-10-CM

## 2022-02-07 LAB
ABSOLUTE EOS #: 0.53 K/UL (ref 0–0.44)
ABSOLUTE IMMATURE GRANULOCYTE: <0.03 K/UL (ref 0–0.3)
ABSOLUTE LYMPH #: 2.42 K/UL (ref 1.1–3.7)
ABSOLUTE MONO #: 0.67 K/UL (ref 0.1–1.2)
ALBUMIN SERPL-MCNC: 3.9 G/DL (ref 3.5–5.2)
ALBUMIN/GLOBULIN RATIO: 1.6 (ref 1–2.5)
ALP BLD-CCNC: 193 U/L (ref 35–104)
ALT SERPL-CCNC: 18 U/L (ref 5–33)
ANION GAP SERPL CALCULATED.3IONS-SCNC: 15 MMOL/L (ref 9–17)
AST SERPL-CCNC: 16 U/L
BASOPHILS # BLD: 1 % (ref 0–2)
BASOPHILS ABSOLUTE: 0.06 K/UL (ref 0–0.2)
BILIRUB SERPL-MCNC: 0.24 MG/DL (ref 0.3–1.2)
BUN BLDV-MCNC: 28 MG/DL (ref 8–23)
BUN/CREAT BLD: ABNORMAL (ref 9–20)
CALCIUM SERPL-MCNC: 9.4 MG/DL (ref 8.6–10.4)
CHLORIDE BLD-SCNC: 105 MMOL/L (ref 98–107)
CO2: 19 MMOL/L (ref 20–31)
CREAT SERPL-MCNC: 1.43 MG/DL (ref 0.5–0.9)
DIFFERENTIAL TYPE: ABNORMAL
EOSINOPHILS RELATIVE PERCENT: 6 % (ref 1–4)
FERRITIN: 27 UG/L (ref 13–150)
GFR AFRICAN AMERICAN: 43 ML/MIN
GFR NON-AFRICAN AMERICAN: 36 ML/MIN
GFR SERPL CREATININE-BSD FRML MDRD: ABNORMAL ML/MIN/{1.73_M2}
GFR SERPL CREATININE-BSD FRML MDRD: ABNORMAL ML/MIN/{1.73_M2}
GLUCOSE FASTING: 168 MG/DL (ref 70–99)
HCT VFR BLD CALC: 33.2 % (ref 36.3–47.1)
HEMOGLOBIN: 10.4 G/DL (ref 11.9–15.1)
IMMATURE GRANULOCYTES: 0 %
IRON SATURATION: 22 % (ref 20–55)
IRON: 70 UG/DL (ref 37–145)
LYMPHOCYTES # BLD: 29 % (ref 24–43)
MCH RBC QN AUTO: 28.1 PG (ref 25.2–33.5)
MCHC RBC AUTO-ENTMCNC: 31.3 G/DL (ref 28.4–34.8)
MCV RBC AUTO: 89.7 FL (ref 82.6–102.9)
MONOCYTES # BLD: 8 % (ref 3–12)
NRBC AUTOMATED: 0 PER 100 WBC
PDW BLD-RTO: 14.3 % (ref 11.8–14.4)
PLATELET # BLD: 226 K/UL (ref 138–453)
PLATELET ESTIMATE: ABNORMAL
PMV BLD AUTO: 10 FL (ref 8.1–13.5)
POTASSIUM SERPL-SCNC: 4.8 MMOL/L (ref 3.7–5.3)
RBC # BLD: 3.7 M/UL (ref 3.95–5.11)
RBC # BLD: ABNORMAL 10*6/UL
SEG NEUTROPHILS: 56 % (ref 36–65)
SEGMENTED NEUTROPHILS ABSOLUTE COUNT: 4.56 K/UL (ref 1.5–8.1)
SODIUM BLD-SCNC: 139 MMOL/L (ref 135–144)
TOTAL IRON BINDING CAPACITY: 318 UG/DL (ref 250–450)
TOTAL PROTEIN: 6.3 G/DL (ref 6.4–8.3)
UNSATURATED IRON BINDING CAPACITY: 248 UG/DL (ref 112–347)
VITAMIN B-12: 756 PG/ML (ref 232–1245)
WBC # BLD: 8.3 K/UL (ref 3.5–11.3)
WBC # BLD: ABNORMAL 10*3/UL

## 2022-02-10 DIAGNOSIS — F41.9 ANXIETY: ICD-10-CM

## 2022-02-10 DIAGNOSIS — E78.5 HYPERLIPIDEMIA WITH TARGET LDL LESS THAN 100: Primary | ICD-10-CM

## 2022-02-10 DIAGNOSIS — D64.9 ANEMIA, UNSPECIFIED TYPE: ICD-10-CM

## 2022-02-10 DIAGNOSIS — R79.89 ABNORMAL THYROID BLOOD TEST: ICD-10-CM

## 2022-02-10 DIAGNOSIS — E11.8 DIABETES MELLITUS TYPE 2 WITH COMPLICATIONS (HCC): ICD-10-CM

## 2022-02-11 LAB
ESTIMATED AVERAGE GLUCOSE: 189 MG/DL
HBA1C MFR BLD: 8.2 % (ref 4–6)

## 2022-02-28 DIAGNOSIS — G25.81 RESTLESS LEG SYNDROME: ICD-10-CM

## 2022-02-28 RX ORDER — GABAPENTIN 300 MG/1
300 CAPSULE ORAL 3 TIMES DAILY
Qty: 270 CAPSULE | Refills: 1 | Status: SHIPPED | OUTPATIENT
Start: 2022-02-28 | End: 2023-02-28

## 2022-02-28 NOTE — TELEPHONE ENCOUNTER
05/08/2022   CBC Once 05/08/2022   Iron and TIBC Once 05/08/2022   TSH without Reflex Once 05/08/2022       Next Visit Date:  Future Appointments   Date Time Provider Eva Jailene   4/14/2022  2:00 PM [facility]            Patient Active Problem List:     Restless leg syndrome     KRISHNA on CPAP     Diabetes mellitus type 2 with complications ([address]     Hyperlipidemia with target LDL less than 100     Asthma     Allergic rhinitis     Essential hypertension     Major depression     Anxiety     Spondylisthesis     S/P lumbar fusion     Gastroesophageal reflux disease     Diabetic nephropathy associated with type 2 diabetes mellitus (HCC)     Moderate single current episode of major depressive disorder ([address]     Irritable bowel syndrome with diarrhea     Morbid obesity with BMI of 40.0-44.9, adult (HCC)     Chronic kidney disease, stage III (moderate) (HCC)     Diabetic polyneuropathy associated with type 2 diabetes mellitus (HCC)     B12 deficiency     Central sleep apnea     GERD (gastroesophageal reflux disease)     Dysphagia     Anemia due to stage 3 chronic kidney disease (HCC)     Abdominal pain, epigastric     Moderate obesity     Severe obesity (BMI 35.0-35.9 with comorbidity) (HCC)     Absolute anemia

## 2022-04-01 DIAGNOSIS — B37.2 YEAST DERMATITIS: ICD-10-CM

## 2022-04-01 DIAGNOSIS — F32.0 CURRENT MILD EPISODE OF MAJOR DEPRESSIVE DISORDER WITHOUT PRIOR EPISODE (HCC): ICD-10-CM

## 2022-04-01 DIAGNOSIS — M43.10 SPONDYLOLISTHESIS, UNSPECIFIED SPINAL REGION: ICD-10-CM

## 2022-04-04 RX ORDER — TIZANIDINE 2 MG/1
TABLET ORAL
Qty: 270 TABLET | Refills: 1 | Status: SHIPPED | OUTPATIENT
Start: 2022-04-04 | End: 2022-08-17

## 2022-04-04 RX ORDER — NYSTATIN 100000 U/G
OINTMENT TOPICAL
Qty: 90 G | Refills: 1 | Status: SHIPPED | OUTPATIENT
Start: 2022-04-04 | End: 2022-08-17

## 2022-04-04 RX ORDER — FLUOXETINE HYDROCHLORIDE 40 MG/1
CAPSULE ORAL
Qty: 90 CAPSULE | Refills: 1 | Status: SHIPPED | OUTPATIENT
Start: 2022-04-04 | End: 2022-08-17

## 2022-04-04 NOTE — TELEPHONE ENCOUNTER
Last visit: 12/29/21  Last Med refill: 10/20/21  Does patient have enough medication for 72 hours: Yes     Next Visit Date:  Future Appointments   Date Time Provider Eva Dent   4/14/2022  2:00 PM MD Lisy Marcano. [address] Maintenance   Topic Date Due    DTaP/Tdap/Td vaccine (1 - Tdap) Never done    Shingles Vaccine (1 of 2) Never done    Depression Monitoring  06/22/2021    Breast cancer screen  12/02/2021    Diabetic foot exam  12/30/2021    Lipid screen  07/01/2022    Annual Wellness Visit (AWV)  07/09/2022    Diabetic retinal exam  10/28/2022    A1C test (Diabetic or Prediabetic)  02/07/2023    Potassium monitoring  02/07/2023    Creatinine monitoring  02/07/2023    Colorectal Cancer Screen  11/08/2031    Flu vaccine  Completed    Pneumococcal 65+ years Vaccine  Completed    COVID-19 Vaccine  Completed    Hepatitis C screen  Completed    DEXA (modify frequency per FRAX score)  Addressed    Hepatitis A vaccine  Aged Out    Hib vaccine  Aged Out    Meningococcal (ACWY) vaccine  Aged Out       Hemoglobin A1C (%)   Date Value   02/07/2022 8.2 (H)   07/01/2021 7.1 (H)   12/29/2020 6.9 (H)             ( goal A1C is < 7)   Microalb/Crt.  Ratio (mcg/mg creat)   Date Value   07/01/2021 CANNOT BE CALCULATED     LDL Cholesterol (mg/dL)   Date Value   07/01/2021 63   12/29/2020 69     LDL Calculated (mg/dL)   Date Value   07/29/2013 82       (goal LDL is <100)   AST (U/L)   Date Value   02/07/2022 16     ALT (U/L)   Date Value   02/07/2022 18     BUN (mg/dL)   Date Value   02/07/2022 28 (H)     BP Readings from Last 3 Encounters:   12/29/21 132/82   11/08/21 (!) 145/94   11/08/21 (!) 185/90          (goal 120/80)    All Future Testing planned in [facility]  Lab Frequency Next Occurrence   Hemoglobin A1C Once 03/07/2022   DEXA BONE DENSITY 2 SITES Once 03/26/2022   Potassium Once 07/01/2022   PIETRO GARO DIGITAL SCREEN BILATERAL Once 11/24/2021   EGD Once 03/01/2022 Lipid Panel Once 05/08/2022   Comprehensive Metabolic Panel Once 55/71/2940   Hemoglobin A1C Once 05/08/2022   CBC Once 05/08/2022   Iron and TIBC Once 05/08/2022   TSH without Reflex Once 05/08/2022               Patient Active Problem List:     Restless leg syndrome     KRISHNA on CPAP     Diabetes mellitus type 2 with complications (HCC)     Hyperlipidemia with target LDL less than 100     Asthma     Allergic rhinitis     Essential hypertension     Major depression     Anxiety     Spondylisthesis     S/P lumbar fusion     Gastroesophageal reflux disease     Diabetic nephropathy associated with type 2 diabetes mellitus (HCC)     Moderate single current episode of major depressive disorder ([facility] [address]     Irritable bowel syndrome with diarrhea     Morbid obesity with BMI of 40.0-44.9, adult (HCC)     Chronic kidney disease, stage III (moderate) (HCC)     Diabetic polyneuropathy associated with type 2 diabetes mellitus (HCC)     B12 deficiency     Central sleep apnea     GERD (gastroesophageal reflux disease)     Dysphagia     Anemia due to stage 3 chronic kidney disease (HCC)     Abdominal pain, epigastric     Moderate obesity     Severe obesity (BMI 35.0-35.9 with comorbidity) (HCC)     Absolute anemia

## 2022-04-04 NOTE — TELEPHONE ENCOUNTER
Last visit: 12/29/21  Last Med refill: nystatin 12/20/21, prozac 12/20/21  Does patient have enough medication for 72 hours: Yes    Next Visit Date:  Future Appointments   Date Time Provider Eva Dent   4/14/2022  2:00 PM MD Lisy LeahyErnestina Clemente [facility] Maintenance   Topic Date Due    DTaP/Tdap/Td vaccine (1 - Tdap) Never done    Shingles Vaccine (1 of 2) Never done    Depression Monitoring  06/22/2021    Breast cancer screen  12/02/2021    Diabetic foot exam  12/30/2021    Lipid screen  07/01/2022    Annual Wellness Visit (AWV)  07/09/2022    Diabetic retinal exam  10/28/2022    A1C test (Diabetic or Prediabetic)  02/07/2023    Potassium monitoring  02/07/2023    Creatinine monitoring  02/07/2023    Colorectal Cancer Screen  11/08/2031    Flu vaccine  Completed    Pneumococcal 65+ years Vaccine  Completed    COVID-19 Vaccine  Completed    Hepatitis C screen  Completed    DEXA (modify frequency per FRAX score)  Addressed    Hepatitis A vaccine  Aged Out    Hib vaccine  Aged Out    Meningococcal (ACWY) vaccine  Aged Out       Hemoglobin A1C (%)   Date Value   02/07/2022 8.2 (H)   07/01/2021 7.1 (H)   12/29/2020 6.9 (H)             ( goal A1C is < 7)   Microalb/Crt.  Ratio (mcg/mg creat)   Date Value   07/01/2021 CANNOT BE CALCULATED     LDL Cholesterol (mg/dL)   Date Value   07/01/2021 63   12/29/2020 69     LDL Calculated (mg/dL)   Date Value   07/29/2013 82       (goal LDL is <100)   AST (U/L)   Date Value   02/07/2022 16     ALT (U/L)   Date Value   02/07/2022 18     BUN (mg/dL)   Date Value   02/07/2022 28 (H)     BP Readings from Last 3 Encounters:   12/29/21 132/82   11/08/21 (!) 145/94   11/08/21 (!) 185/90          (goal 120/80)    All Future Testing planned in [facility]  Lab Frequency Next Occurrence   Hemoglobin A1C Once 03/07/2022   DEXA BONE DENSITY 2 SITES Once 03/26/2022   Potassium Once 07/01/2022   PIETRO GARO DIGITAL SCREEN BILATERAL Once 11/24/2021 EGD Once 03/01/2022   Lipid Panel Once 05/08/2022   Comprehensive Metabolic Panel Once 13/90/7799   Hemoglobin A1C Once 05/08/2022   CBC Once 05/08/2022   Iron and TIBC Once 05/08/2022   TSH without Reflex Once 05/08/2022               Patient Active Problem List:     Restless leg syndrome     KRISHNA on CPAP     Diabetes mellitus type 2 with complications (HCC)     Hyperlipidemia with target LDL less than 100     Asthma     Allergic rhinitis     Essential hypertension     Major depression     Anxiety     Spondylisthesis     S/P lumbar fusion     Gastroesophageal reflux disease     Diabetic nephropathy associated with type 2 diabetes mellitus (HCC)     Moderate single current episode of major depressive disorder ([address]     Irritable bowel syndrome with diarrhea     Morbid obesity with BMI of 40.0-44.9, adult (HCC)     Chronic kidney disease, stage III (moderate) (HCC)     Diabetic polyneuropathy associated with type 2 diabetes mellitus (HCC)     B12 deficiency     Central sleep apnea     GERD (gastroesophageal reflux disease)     Dysphagia     Anemia due to stage 3 chronic kidney disease (HCC)     Abdominal pain, epigastric     Moderate obesity     Severe obesity (BMI 35.0-35.9 with comorbidity) (HCC)     Absolute anemia

## 2022-04-14 ENCOUNTER — OFFICE VISIT (OUTPATIENT)
Dept: FAMILY MEDICINE CLINIC | Age: 72
End: 2022-04-14
Payer: MEDICARE

## 2022-04-14 VITALS
HEART RATE: 62 BPM | BODY MASS INDEX: 38.34 KG/M2 | HEIGHT: 63 IN | DIASTOLIC BLOOD PRESSURE: 70 MMHG | TEMPERATURE: 97.2 F | WEIGHT: 216.4 LBS | SYSTOLIC BLOOD PRESSURE: 124 MMHG | OXYGEN SATURATION: 97 %

## 2022-04-14 DIAGNOSIS — E11.8 DIABETES MELLITUS TYPE 2 WITH COMPLICATIONS (HCC): ICD-10-CM

## 2022-04-14 DIAGNOSIS — D63.1 ANEMIA DUE TO STAGE 3 CHRONIC KIDNEY DISEASE, UNSPECIFIED WHETHER STAGE 3A OR 3B CKD (HCC): ICD-10-CM

## 2022-04-14 DIAGNOSIS — I10 ESSENTIAL HYPERTENSION: Primary | ICD-10-CM

## 2022-04-14 DIAGNOSIS — G47.31 CENTRAL SLEEP APNEA: ICD-10-CM

## 2022-04-14 DIAGNOSIS — G25.81 RESTLESS LEG SYNDROME: ICD-10-CM

## 2022-04-14 DIAGNOSIS — K58.0 IRRITABLE BOWEL SYNDROME WITH DIARRHEA: ICD-10-CM

## 2022-04-14 DIAGNOSIS — K21.9 GASTROESOPHAGEAL REFLUX DISEASE WITHOUT ESOPHAGITIS: ICD-10-CM

## 2022-04-14 DIAGNOSIS — G47.33 OSA (OBSTRUCTIVE SLEEP APNEA): ICD-10-CM

## 2022-04-14 DIAGNOSIS — F32.1 MODERATE SINGLE CURRENT EPISODE OF MAJOR DEPRESSIVE DISORDER (HCC): ICD-10-CM

## 2022-04-14 DIAGNOSIS — G89.29 CHRONIC BILATERAL LOW BACK PAIN WITHOUT SCIATICA: ICD-10-CM

## 2022-04-14 DIAGNOSIS — M54.50 CHRONIC BILATERAL LOW BACK PAIN WITHOUT SCIATICA: ICD-10-CM

## 2022-04-14 DIAGNOSIS — J45.20 MILD INTERMITTENT ASTHMA WITHOUT COMPLICATION: ICD-10-CM

## 2022-04-14 DIAGNOSIS — N18.30 ANEMIA DUE TO STAGE 3 CHRONIC KIDNEY DISEASE, UNSPECIFIED WHETHER STAGE 3A OR 3B CKD (HCC): ICD-10-CM

## 2022-04-14 DIAGNOSIS — M25.511 CHRONIC PAIN OF BOTH SHOULDERS: ICD-10-CM

## 2022-04-14 DIAGNOSIS — E53.8 B12 DEFICIENCY: ICD-10-CM

## 2022-04-14 DIAGNOSIS — G89.29 CHRONIC PAIN OF BOTH SHOULDERS: ICD-10-CM

## 2022-04-14 DIAGNOSIS — M15.9 GENERALIZED OSTEOARTHRITIS: ICD-10-CM

## 2022-04-14 DIAGNOSIS — E11.21 DIABETIC NEPHROPATHY ASSOCIATED WITH TYPE 2 DIABETES MELLITUS (HCC): ICD-10-CM

## 2022-04-14 DIAGNOSIS — M43.10 SPONDYLOLISTHESIS, UNSPECIFIED SPINAL REGION: ICD-10-CM

## 2022-04-14 DIAGNOSIS — Z98.1 S/P LUMBAR FUSION: ICD-10-CM

## 2022-04-14 DIAGNOSIS — E11.42 DIABETIC POLYNEUROPATHY ASSOCIATED WITH TYPE 2 DIABETES MELLITUS (HCC): ICD-10-CM

## 2022-04-14 DIAGNOSIS — M25.512 CHRONIC PAIN OF BOTH SHOULDERS: ICD-10-CM

## 2022-04-14 DIAGNOSIS — E66.01 CLASS 2 SEVERE OBESITY DUE TO EXCESS CALORIES WITH SERIOUS COMORBIDITY AND BODY MASS INDEX (BMI) OF 38.0 TO 38.9 IN ADULT (HCC): ICD-10-CM

## 2022-04-14 DIAGNOSIS — D64.9 ANEMIA, UNSPECIFIED TYPE: ICD-10-CM

## 2022-04-14 DIAGNOSIS — M17.12 PRIMARY OSTEOARTHRITIS OF LEFT KNEE: ICD-10-CM

## 2022-04-14 DIAGNOSIS — M25.551 RIGHT HIP PAIN: ICD-10-CM

## 2022-04-14 DIAGNOSIS — F41.9 ANXIETY: ICD-10-CM

## 2022-04-14 DIAGNOSIS — K44.9 HIATAL HERNIA: ICD-10-CM

## 2022-04-14 DIAGNOSIS — E78.5 HYPERLIPIDEMIA WITH TARGET LDL LESS THAN 100: ICD-10-CM

## 2022-04-14 DIAGNOSIS — R79.89 ABNORMAL THYROID BLOOD TEST: ICD-10-CM

## 2022-04-14 DIAGNOSIS — J30.1 CHRONIC SEASONAL ALLERGIC RHINITIS DUE TO POLLEN: ICD-10-CM

## 2022-04-14 DIAGNOSIS — N18.30 STAGE 3 CHRONIC KIDNEY DISEASE, UNSPECIFIED WHETHER STAGE 3A OR 3B CKD (HCC): ICD-10-CM

## 2022-04-14 PROCEDURE — 1036F TOBACCO NON-USER: CPT | Performed by: PEDIATRICS

## 2022-04-14 PROCEDURE — 99215 OFFICE O/P EST HI 40 MIN: CPT | Performed by: PEDIATRICS

## 2022-04-14 PROCEDURE — 1123F ACP DISCUSS/DSCN MKR DOCD: CPT | Performed by: PEDIATRICS

## 2022-04-14 PROCEDURE — G8427 DOCREV CUR MEDS BY ELIG CLIN: HCPCS | Performed by: PEDIATRICS

## 2022-04-14 PROCEDURE — G8399 PT W/DXA RESULTS DOCUMENT: HCPCS | Performed by: PEDIATRICS

## 2022-04-14 PROCEDURE — 3052F HG A1C>EQUAL 8.0%<EQUAL 9.0%: CPT | Performed by: PEDIATRICS

## 2022-04-14 PROCEDURE — 4040F PNEUMOC VAC/ADMIN/RCVD: CPT | Performed by: PEDIATRICS

## 2022-04-14 PROCEDURE — 2022F DILAT RTA XM EVC RTNOPTHY: CPT | Performed by: PEDIATRICS

## 2022-04-14 PROCEDURE — G8417 CALC BMI ABV UP PARAM F/U: HCPCS | Performed by: PEDIATRICS

## 2022-04-14 PROCEDURE — 1090F PRES/ABSN URINE INCON ASSESS: CPT | Performed by: PEDIATRICS

## 2022-04-14 PROCEDURE — 3017F COLORECTAL CA SCREEN DOC REV: CPT | Performed by: PEDIATRICS

## 2022-04-14 SDOH — ECONOMIC STABILITY: FOOD INSECURITY: WITHIN THE PAST 12 MONTHS, THE FOOD YOU BOUGHT JUST DIDN'T LAST AND YOU DIDN'T HAVE MONEY TO GET MORE.: NEVER TRUE

## 2022-04-14 SDOH — ECONOMIC STABILITY: FOOD INSECURITY: WITHIN THE PAST 12 MONTHS, YOU WORRIED THAT YOUR FOOD WOULD RUN OUT BEFORE YOU GOT MONEY TO BUY MORE.: NEVER TRUE

## 2022-04-14 ASSESSMENT — PATIENT HEALTH QUESTIONNAIRE - PHQ9
SUM OF ALL RESPONSES TO PHQ QUESTIONS 1-9: 5
3. TROUBLE FALLING OR STAYING ASLEEP: 1
SUM OF ALL RESPONSES TO PHQ QUESTIONS 1-9: 5
2. FEELING DOWN, DEPRESSED OR HOPELESS: 0
SUM OF ALL RESPONSES TO PHQ QUESTIONS 1-9: 5
6. FEELING BAD ABOUT YOURSELF - OR THAT YOU ARE A FAILURE OR HAVE LET YOURSELF OR YOUR FAMILY DOWN: 0
9. THOUGHTS THAT YOU WOULD BE BETTER OFF DEAD, OR OF HURTING YOURSELF: 0
5. POOR APPETITE OR OVEREATING: 1
1. LITTLE INTEREST OR PLEASURE IN DOING THINGS: 0
7. TROUBLE CONCENTRATING ON THINGS, SUCH AS READING THE NEWSPAPER OR WATCHING TELEVISION: 0
8. MOVING OR SPEAKING SO SLOWLY THAT OTHER PEOPLE COULD HAVE NOTICED. OR THE OPPOSITE, BEING SO FIGETY OR RESTLESS THAT YOU HAVE BEEN MOVING AROUND A LOT MORE THAN USUAL: 0
SUM OF ALL RESPONSES TO PHQ QUESTIONS 1-9: 5
SUM OF ALL RESPONSES TO PHQ9 QUESTIONS 1 & 2: 0
4. FEELING TIRED OR HAVING LITTLE ENERGY: 3
10. IF YOU CHECKED OFF ANY PROBLEMS, HOW DIFFICULT HAVE THESE PROBLEMS MADE IT FOR YOU TO DO YOUR WORK, TAKE CARE OF THINGS AT HOME, OR GET ALONG WITH OTHER PEOPLE: 0

## 2022-04-14 ASSESSMENT — ENCOUNTER SYMPTOMS
TROUBLE SWALLOWING: 0
COUGH: 0
COLOR CHANGE: 0
EYE PAIN: 0
CHOKING: 0
RHINORRHEA: 0
ABDOMINAL PAIN: 0
NAUSEA: 0
VOMITING: 0
WHEEZING: 0
BACK PAIN: 1
CONSTIPATION: 0
SORE THROAT: 0
BLOOD IN STOOL: 0
EYE DISCHARGE: 0
DIARRHEA: 1
STRIDOR: 0
EYE REDNESS: 0
SINUS PRESSURE: 0
CHEST TIGHTNESS: 0
SHORTNESS OF BREATH: 0

## 2022-04-14 ASSESSMENT — SOCIAL DETERMINANTS OF HEALTH (SDOH): HOW HARD IS IT FOR YOU TO PAY FOR THE VERY BASICS LIKE FOOD, HOUSING, MEDICAL CARE, AND HEATING?: NOT HARD AT ALL

## 2022-04-14 NOTE — PROGRESS NOTES
Kaity Preciado (:  1950) is a 74 y.o. female,Established patient, here for evaluation of the following chief complaint(s):  Hypertension and Diabetes         ASSESSMENT/PLAN:    1. Essential hypertension  2. Hyperlipidemia with target LDL less than 100  3. Class 2 severe obesity due to excess calories with serious comorbidity and body mass index (BMI) of 38.0 to 38.9 in adult ([facility]  4. Mild intermittent asthma without complication  5. Chronic seasonal allergic rhinitis due to pollen  6. KRISHNA (obstructive sleep apnea)  7. Central sleep apnea  8. Restless leg syndrome  9. Gastroesophageal reflux disease without esophagitis  10. Hiatal hernia  11. Irritable bowel syndrome with diarrhea  12. Moderate single current episode of major depressive disorder ([facility]  13. Anxiety  14. Chronic bilateral low back pain without sciatica  15. Spondylolisthesis, unspecified spinal region  16. S/P lumbar fusion  17. Diabetes mellitus type 2 with complications (HCC)  -      DIABETES FOOT EXAM  18. Diabetic nephropathy associated with type 2 diabetes mellitus (HCC)  -      DIABETES FOOT EXAM  19. Stage 3 chronic kidney disease, unspecified whether stage 3a or 3b CKD ([facility]  20. Diabetic polyneuropathy associated with type 2 diabetes mellitus ([facility]  21. Generalized osteoarthritis  22. Primary osteoarthritis of left knee  23. Chronic pain of both shoulders  24. Right hip pain  25. B12 deficiency  26. Abnormal thyroid blood test  27. Anemia, unspecified type  28.  Anemia due to stage 3 chronic kidney disease, unspecified whether stage 3a or 3b CKD ([facility] [address]    Reviewed recent labs  Obtain labs as ordered in several months  Follow-up with cardiology, Dr. Radha Haji as scheduled  Monitor blood pressure occasionally  Continued weight reduction encouraged  Continue Singulair  Trial of OTC antihistamine like zyrtec or claritin and nasal spray like nasonex  Continue Servo vent and follow-up with pulmonary  Continue gabapentin for restless leg syndrome  Continue PPI  Colonoscopy due in 2024  Continue Prozac and consider counseling  Continue gabapentin and tizanidine for low back pain  Follow-up with endocrinology as scheduled  Consider nephrology consult if kidney function worsens  Follow-up with Ortho  Continue B12 supplement  Obtain mammogram  Obtain DEXA scan  Tdap and shingles vaccines recommended - shingles is too expensive at the local pharmacy  Call with concerns        Return in about 3 months (around 7/14/2022) for routine follow up. Subjective     HPI    Patient presents today for routine follow-up of her chronic medical problems including hypertension, hyperlipidemia, obesity, asthma, allergies, KRISHNA, central sleep apnea, restless leg syndrome, GERD, depression, anxiety, and chronic low back pain from spondylolisthesis status post lumbar fusion. She also has a history of type 2 diabetes with chronic kidney disease and neuropathy. She has generalized osteoarthritis with pain particularly in the left knee. She does get shoulder pain and right hip pain. She also has a history of B12 deficiency, abnormal thyroid labs and a history of anemia suspected to be anemia of chronic disease. Overall she states she has been doing very well. She has started taking some turmeric curcumin. Her blood pressure is well controlled with losartan. She denies any side effects from her medication. She does continue on Lipitor for her history of hyperlipidemia and denies any side effects from her medication. She does have a history of obesity and continues to lose weight slowly over time. Her asthma and allergies are well controlled with Singulair. She does get a lot of congestion this time of year. She does have obstructive sleep apnea and central sleep apnea that is now treated with a Servo vent. She does have a pulmonologist, Dr. Diana Regalado but has not seen him in some time. I did encourage her to follow-up.   She does have a history of restless leg syndrome that is controlled with Neurontin. She does have a history of GERD that is controlled with Prilosec. She did have an EGD and colonoscopy in 2021 she does have a small 1 to 2 cm hiatal hernia that was noted on EGD. Her colonoscopy did show diverticulosis and a redundant colon. It was recommended that she have a repeat colonoscopy in 2024. She does have a history of irritable bowel syndrome and gets intermittent diarrhea from this. She has started Metamucil and this has been helpful. She does have a history of depression and anxiety that is well controlled with Prozac daily and Ativan as needed. She has not used Ativan in a very long time. She did undergo a posterior lumbar interbody fusion at L4-5 due to spondylolisthesis in 2016. She does still get intermittent low back pain that bothers her but this is nothing too concerning. She does continue on Neurontin and tizanidine as needed and this is helpful. She does have a history of type 2 diabetes with chronic kidney disease and neuropathy that is followed by endocrinology, Dr. Aure Lema. She does continue to take insulin 70/30 at 10 units once daily and she will drop this down to 7 units if her blood sugar is less than 100. She also continues on glimepiride and Metformin. She is monitoring her blood sugars regularly. Her last hemoglobin A1c at endocrinology was 7.2 which was improved from 8.2 in February. She has been really watching her diet. She was encouraged to exercise. Her eye exam is up-to-date with Dr. Daryle Buoy. Her foot exam was done today. She does get some occasional numbness and tingling in her feet but nothing too concerning. She has seen nephrology in the past but has not seen them in some time and prefers not to see anyone if not necessary. She does have a history of generalized osteoarthritis. This is severe in the left knee.   She did see Dr. Dotty Obrien many years ago for severe osteoarthritis of the left knee and I did recommend that she should follow-up with him. She does have shoulder pain from some arthritis but she actually reports that her shoulder pain is a lot better and her hip pain is fairly stable. A hip xray in the past did not show significant arthritis. Her left knee bothers her the most.  She does have history of B12 deficiency and takes an over-the-counter B12 supplement. She does have a history of abnormal thyroid labs in the past.  She did have a thyroid ultrasound that showed that her thyroid gland was mildly heterogeneous and diminutive. Endocrinology advised her that they will continue to monitor this over time. She does have a history of anemia which is likely anemia of chronic disease secondary to her CKD. She does have an order for DEXA scan and mammogram that she is going to get scheduled. She has no other concerns at this time. cmw        Review of Systems   Constitutional: Negative for activity change, appetite change, fatigue, fever and unexpected weight change. HENT: Positive for congestion and postnasal drip. Negative for ear pain, rhinorrhea, sinus pressure, sneezing, sore throat, tinnitus and trouble swallowing. Eyes: Negative for pain, discharge, redness and visual disturbance. Respiratory: Negative for cough, choking, chest tightness, shortness of breath, wheezing and stridor. Asthma well controlled with singulair daily and albuterol as needed. KRISHNA / central sleep apnea controlled with Servovent nightly - she follows up with Dr. Sue Rodriguez. Cardiovascular: Negative for chest pain, palpitations and leg swelling. Gastrointestinal: Positive for diarrhea (secondary to irritable bowel - improved with fiber). Negative for abdominal pain, blood in stool, constipation, nausea and vomiting. GERD treated with omeprazole and tums occasionally   Endocrine: Negative for polydipsia, polyphagia and polyuria.         History of abnormal thyroid labs Genitourinary: Negative for dysuria, pelvic pain and urgency. Musculoskeletal: Positive for arthralgias (occasional bilateral shoulder pain / right hip pain / left knee pain) and back pain (Mid back pain occasionally). Negative for myalgias and neck pain. Skin: Positive for rash (only a few excoriated macules over the arms and back - c/w neurodermatitis - much improved from before ). Negative for color change and wound. Allergic/Immunologic: Negative for immunocompromised state. Neurological: Positive for tremors (shaky at times), weakness and numbness. Negative for dizziness, light-headedness and headaches. Hematological: Negative for adenopathy. Does not bruise/bleed easily. History of mild anemia   Psychiatric/Behavioral: Positive for dysphoric mood (stable with prozac) and sleep disturbance (stable with trazodone). Negative for agitation, confusion and decreased concentration. The patient is nervous/anxious (stable). Objective      Physical Exam  Vitals and nursing note reviewed. Constitutional:       General: She is not in acute distress. Appearance: Normal appearance. She is well-developed. She is obese. She is not ill-appearing, toxic-appearing or diaphoretic. HENT:      Head: Normocephalic. Right Ear: Tympanic membrane, ear canal and external ear normal. There is no impacted cerumen. Left Ear: Tympanic membrane, ear canal and external ear normal. There is no impacted cerumen. Nose: Mucosal edema present. No congestion or rhinorrhea. Mouth/Throat:      Mouth: Mucous membranes are moist.      Pharynx: No oropharyngeal exudate. Eyes:      General: No scleral icterus. Right eye: No discharge. Left eye: No discharge. Extraocular Movements: Extraocular movements intact. Conjunctiva/sclera: Conjunctivae normal.      Pupils: Pupils are equal, round, and reactive to light. Neck:      Thyroid: No thyromegaly. Vascular: No JVD. Cardiovascular:      Rate and Rhythm: Normal rate and regular rhythm. Pulses: Normal pulses. Heart sounds: Murmur heard. Pulmonary:      Effort: Pulmonary effort is normal.      Breath sounds: Normal breath sounds. No stridor. No wheezing or rales. Abdominal:      General: Bowel sounds are normal.      Palpations: Abdomen is soft. There is no mass. Tenderness: There is no abdominal tenderness. There is no right CVA tenderness or left CVA tenderness. Musculoskeletal:      Cervical back: Normal range of motion and neck supple. Lumbar back: Tenderness present. Decreased range of motion. Back:       Right hip: Tenderness present. Normal strength. Left hip: Tenderness present. Normal strength. Right knee: Normal.      Left knee: Decreased range of motion. Tenderness present over the medial joint line. Right lower leg: No edema. Left lower leg: No edema. Feet:      Right foot:      Protective Sensation: 6 sites tested. 6 sites sensed. Skin integrity: Dry skin present. Toenail Condition: Right toenails are normal.      Left foot:      Protective Sensation: 6 sites tested. 6 sites sensed. Skin integrity: Dry skin present. Toenail Condition: Left toenails are normal.   Lymphadenopathy:      Cervical: No cervical adenopathy. Skin:     General: Skin is warm. Capillary Refill: Capillary refill takes less than 2 seconds. Findings: Rash (only a few excoriated macules over the legs, arms, chest and back ) present. Neurological:      General: No focal deficit present. Mental Status: She is alert and oriented to person, place, and time. Cranial Nerves: No cranial nerve deficit. Motor: No abnormal muscle tone.       Coordination: Coordination normal.      Deep Tendon Reflexes: Reflexes normal.   Psychiatric:         Attention and Perception: Attention normal.         Mood and Affect: Mood normal. Mood is not anxious or depressed. Speech: Speech normal.         Behavior: Behavior normal.         Thought Content: Thought content normal.         Judgment: Judgment normal.            On this date 4/14/2022 I have spent 40 minutes reviewing previous notes, test results and face to face with the patient discussing the diagnosis and importance of compliance with the treatment plan as well as documenting on the day of the visit. An electronic signature was used to authenticate this note.     --Marita Garcia MD

## 2022-05-28 DIAGNOSIS — F32.9 MAJOR DEPRESSIVE DISORDER WITH CURRENT ACTIVE EPISODE, UNSPECIFIED DEPRESSION EPISODE SEVERITY, UNSPECIFIED WHETHER RECURRENT: ICD-10-CM

## 2022-05-28 DIAGNOSIS — E78.5 HYPERLIPIDEMIA, UNSPECIFIED HYPERLIPIDEMIA TYPE: ICD-10-CM

## 2022-05-31 RX ORDER — TRAZODONE HYDROCHLORIDE 100 MG/1
100-200 TABLET ORAL NIGHTLY
Qty: 180 TABLET | Refills: 1 | Status: SHIPPED | OUTPATIENT
Start: 2022-05-31 | End: 2022-11-27

## 2022-05-31 RX ORDER — ATORVASTATIN CALCIUM 80 MG/1
80 TABLET, FILM COATED ORAL DAILY
Qty: 90 TABLET | Refills: 1 | Status: SHIPPED | OUTPATIENT
Start: 2022-05-31 | End: 2023-05-31

## 2022-05-31 NOTE — TELEPHONE ENCOUNTER
LOV 4/14/22  LRF 9/27/21 Both  RTO 7/27/22      Health Maintenance   Topic Date Due    Breast cancer screen  12/02/2021    DTaP/Tdap/Td vaccine (1 - Tdap) 06/14/2022 (Originally 2/3/1969)    Shingles vaccine (1 of 2) 06/14/2022 (Originally 2/3/1998)    Lipids  07/01/2022    Annual Wellness Visit (AWV)  07/09/2022    Diabetic retinal exam  10/28/2022    A1C test (Diabetic or Prediabetic)  02/07/2023    Diabetic foot exam  04/14/2023    Depression Monitoring  04/14/2023    Colorectal Cancer Screen  11/08/2031    Flu vaccine  Completed    Pneumococcal 65+ years Vaccine  Completed    COVID-19 Vaccine  Completed    Hepatitis C screen  Completed    DEXA (modify frequency per FRAX score)  Addressed    Hepatitis A vaccine  Aged Out    Hib vaccine  Aged Out    Meningococcal (ACWY) vaccine  Aged Out             (applicable per patient's age: Cancer Screenings, Depression Screening, Fall Risk Screening, Immunizations)    Hemoglobin A1C (%)   Date Value   02/07/2022 8.2 (H)   07/01/2021 7.1 (H)   12/29/2020 6.9 (H)     Microalb/Crt.  Ratio (mcg/mg creat)   Date Value   07/01/2021 CANNOT BE CALCULATED     LDL Cholesterol (mg/dL)   Date Value   07/01/2021 63     LDL Calculated (mg/dL)   Date Value   07/29/2013 82     AST (U/L)   Date Value   02/07/2022 16     ALT (U/L)   Date Value   02/07/2022 18     BUN (mg/dL)   Date Value   02/07/2022 28 (H)      (goal A1C is < 7)   (goal LDL is <100) need 30-50% reduction from baseline     BP Readings from Last 3 Encounters:   04/14/22 124/70   12/29/21 132/82   11/08/21 (!) 145/94    (goal /80)      All Future Testing planned in [facility]:  Lab Frequency Next Occurrence   Hemoglobin A1C Once 03/07/2022   DEXA BONE DENSITY 2 SITES Once 03/26/2022   Potassium Once 07/01/2022   PIETRO GARO DIGITAL SCREEN BILATERAL Once 11/24/2021   EGD Once 03/01/2022   Lipid Panel Once 05/08/2022   Comprehensive Metabolic Panel Once 97/82/6698   Hemoglobin A1C Once 05/08/2022   CBC Once 05/08/2022   Iron and TIBC Once 05/08/2022   TSH without Reflex Once 05/08/2022       Next Visit Date:  Future Appointments   Date Time Provider Eva Jailene   7/27/2022  2:00 PM MD Ángel Banks [facility]   7/27/2022  2:30 PM MD Nino Banks [facility]            Patient Active Problem List:     Restless leg syndrome     KRISHNA on CPAP     Diabetes mellitus type 2 with complications ([address]     Hyperlipidemia with target LDL less than 100     Asthma     Allergic rhinitis     Essential hypertension     Major depression     Anxiety     Spondylisthesis     S/P lumbar fusion     Gastroesophageal reflux disease     Diabetic nephropathy associated with type 2 diabetes mellitus (HCC)     Moderate single current episode of major depressive disorder ([address]     Irritable bowel syndrome with diarrhea     Morbid obesity with BMI of 40.0-44.9, adult (HCC)     Chronic kidney disease, stage III (moderate) ([facility])     Diabetic polyneuropathy associated with type 2 diabetes mellitus (HCC)     B12 deficiency     Central sleep apnea     GERD (gastroesophageal reflux disease)     Dysphagia     Anemia due to stage 3 chronic kidney disease (HCC)     Abdominal pain, epigastric     Moderate obesity     Severe obesity (BMI 35.0-35.9 with comorbidity) ([facility])     Absolute anemia

## 2022-07-21 ENCOUNTER — HOSPITAL ENCOUNTER (OUTPATIENT)
Age: 72
Setting detail: SPECIMEN
Discharge: HOME | End: 2022-07-21

## 2022-07-21 DIAGNOSIS — E11.8 DIABETES MELLITUS TYPE 2 WITH COMPLICATIONS (HCC): ICD-10-CM

## 2022-07-21 DIAGNOSIS — D64.9 ANEMIA, UNSPECIFIED TYPE: ICD-10-CM

## 2022-07-21 DIAGNOSIS — E78.5 HYPERLIPIDEMIA WITH TARGET LDL LESS THAN 100: ICD-10-CM

## 2022-07-21 DIAGNOSIS — F41.9 ANXIETY: ICD-10-CM

## 2022-07-21 DIAGNOSIS — R79.89 ABNORMAL THYROID BLOOD TEST: ICD-10-CM

## 2022-07-21 LAB
ALBUMIN SERPL-MCNC: 4 G/DL (ref 3.5–5.2)
ALBUMIN/GLOBULIN RATIO: 1.5 (ref 1–2.5)
ALP BLD-CCNC: 154 U/L (ref 35–104)
ALT SERPL-CCNC: 17 U/L (ref 5–33)
ANION GAP SERPL CALCULATED.3IONS-SCNC: 10 MMOL/L (ref 9–17)
AST SERPL-CCNC: 20 U/L
BILIRUB SERPL-MCNC: 0.24 MG/DL (ref 0.3–1.2)
BUN BLDV-MCNC: 24 MG/DL (ref 8–23)
CALCIUM SERPL-MCNC: 9.3 MG/DL (ref 8.6–10.4)
CHLORIDE BLD-SCNC: 107 MMOL/L (ref 98–107)
CHOLESTEROL/HDL RATIO: 3.5
CHOLESTEROL: 138 MG/DL
CO2: 23 MMOL/L (ref 20–31)
CREAT SERPL-MCNC: 1.49 MG/DL (ref 0.5–0.9)
GFR AFRICAN AMERICAN: 41 ML/MIN
GFR NON-AFRICAN AMERICAN: 34 ML/MIN
GFR SERPL CREATININE-BSD FRML MDRD: ABNORMAL ML/MIN/{1.73_M2}
GLUCOSE BLD-MCNC: 69 MG/DL (ref 70–99)
HCT VFR BLD CALC: 32.8 % (ref 36.3–47.1)
HDLC SERPL-MCNC: 40 MG/DL
HEMOGLOBIN: 10.5 G/DL (ref 11.9–15.1)
IRON SATURATION: 20 % (ref 20–55)
IRON: 67 UG/DL (ref 37–145)
LDL CHOLESTEROL: 79 MG/DL (ref 0–130)
MCH RBC QN AUTO: 29.2 PG (ref 25.2–33.5)
MCHC RBC AUTO-ENTMCNC: 32 G/DL (ref 28.4–34.8)
MCV RBC AUTO: 91.4 FL (ref 82.6–102.9)
NRBC AUTOMATED: 0 PER 100 WBC
PDW BLD-RTO: 14.2 % (ref 11.8–14.4)
PLATELET # BLD: 236 K/UL (ref 138–453)
PMV BLD AUTO: 9.8 FL (ref 8.1–13.5)
POTASSIUM SERPL-SCNC: 4.6 MMOL/L (ref 3.7–5.3)
RBC # BLD: 3.59 M/UL (ref 3.95–5.11)
SODIUM BLD-SCNC: 140 MMOL/L (ref 135–144)
TOTAL IRON BINDING CAPACITY: 333 UG/DL (ref 250–450)
TOTAL PROTEIN: 6.7 G/DL (ref 6.4–8.3)
TRIGL SERPL-MCNC: 96 MG/DL
TSH SERPL DL<=0.05 MIU/L-ACNC: 2.84 UIU/ML (ref 0.3–5)
UNSATURATED IRON BINDING CAPACITY: 266 UG/DL (ref 112–347)
WBC # BLD: 10.1 K/UL (ref 3.5–11.3)

## 2022-07-22 LAB
ESTIMATED AVERAGE GLUCOSE: 163 MG/DL
HBA1C MFR BLD: 7.3 % (ref 4–6)

## 2022-07-27 ENCOUNTER — OFFICE VISIT (OUTPATIENT)
Dept: FAMILY MEDICINE CLINIC | Age: 72
End: 2022-07-27
Payer: MEDICARE

## 2022-07-27 VITALS
HEART RATE: 57 BPM | TEMPERATURE: 97.6 F | WEIGHT: 215.6 LBS | DIASTOLIC BLOOD PRESSURE: 74 MMHG | BODY MASS INDEX: 38.2 KG/M2 | HEIGHT: 63 IN | SYSTOLIC BLOOD PRESSURE: 116 MMHG

## 2022-07-27 VITALS
WEIGHT: 215.6 LBS | RESPIRATION RATE: 16 BRPM | HEART RATE: 57 BPM | DIASTOLIC BLOOD PRESSURE: 74 MMHG | SYSTOLIC BLOOD PRESSURE: 116 MMHG | TEMPERATURE: 97.6 F | BODY MASS INDEX: 38.2 KG/M2 | HEIGHT: 63 IN

## 2022-07-27 DIAGNOSIS — E11.8 DIABETES MELLITUS TYPE 2 WITH COMPLICATIONS (HCC): ICD-10-CM

## 2022-07-27 DIAGNOSIS — I10 ESSENTIAL HYPERTENSION: Primary | ICD-10-CM

## 2022-07-27 DIAGNOSIS — J30.1 CHRONIC SEASONAL ALLERGIC RHINITIS DUE TO POLLEN: ICD-10-CM

## 2022-07-27 DIAGNOSIS — M25.551 RIGHT HIP PAIN: ICD-10-CM

## 2022-07-27 DIAGNOSIS — D63.1 ANEMIA DUE TO STAGE 3 CHRONIC KIDNEY DISEASE, UNSPECIFIED WHETHER STAGE 3A OR 3B CKD (HCC): ICD-10-CM

## 2022-07-27 DIAGNOSIS — F41.9 ANXIETY: ICD-10-CM

## 2022-07-27 DIAGNOSIS — G89.29 CHRONIC PAIN OF BOTH SHOULDERS: ICD-10-CM

## 2022-07-27 DIAGNOSIS — E53.8 B12 DEFICIENCY: ICD-10-CM

## 2022-07-27 DIAGNOSIS — F32.1 MODERATE SINGLE CURRENT EPISODE OF MAJOR DEPRESSIVE DISORDER (HCC): ICD-10-CM

## 2022-07-27 DIAGNOSIS — G89.29 CHRONIC BILATERAL LOW BACK PAIN WITHOUT SCIATICA: ICD-10-CM

## 2022-07-27 DIAGNOSIS — G47.33 OSA (OBSTRUCTIVE SLEEP APNEA): ICD-10-CM

## 2022-07-27 DIAGNOSIS — E66.01 CLASS 2 SEVERE OBESITY DUE TO EXCESS CALORIES WITH SERIOUS COMORBIDITY AND BODY MASS INDEX (BMI) OF 38.0 TO 38.9 IN ADULT (HCC): ICD-10-CM

## 2022-07-27 DIAGNOSIS — E11.21 DIABETIC NEPHROPATHY ASSOCIATED WITH TYPE 2 DIABETES MELLITUS (HCC): ICD-10-CM

## 2022-07-27 DIAGNOSIS — K44.9 HIATAL HERNIA: ICD-10-CM

## 2022-07-27 DIAGNOSIS — M25.511 CHRONIC PAIN OF BOTH SHOULDERS: ICD-10-CM

## 2022-07-27 DIAGNOSIS — R79.89 ABNORMAL THYROID BLOOD TEST: ICD-10-CM

## 2022-07-27 DIAGNOSIS — J45.20 MILD INTERMITTENT ASTHMA WITHOUT COMPLICATION: ICD-10-CM

## 2022-07-27 DIAGNOSIS — Z78.0 POST-MENOPAUSAL: ICD-10-CM

## 2022-07-27 DIAGNOSIS — E11.42 DIABETIC POLYNEUROPATHY ASSOCIATED WITH TYPE 2 DIABETES MELLITUS (HCC): ICD-10-CM

## 2022-07-27 DIAGNOSIS — M15.9 GENERALIZED OSTEOARTHRITIS: ICD-10-CM

## 2022-07-27 DIAGNOSIS — M43.10 SPONDYLOLISTHESIS, UNSPECIFIED SPINAL REGION: ICD-10-CM

## 2022-07-27 DIAGNOSIS — G47.31 CENTRAL SLEEP APNEA: ICD-10-CM

## 2022-07-27 DIAGNOSIS — D64.9 ANEMIA, UNSPECIFIED TYPE: ICD-10-CM

## 2022-07-27 DIAGNOSIS — M17.12 PRIMARY OSTEOARTHRITIS OF LEFT KNEE: ICD-10-CM

## 2022-07-27 DIAGNOSIS — N18.30 ANEMIA DUE TO STAGE 3 CHRONIC KIDNEY DISEASE, UNSPECIFIED WHETHER STAGE 3A OR 3B CKD (HCC): ICD-10-CM

## 2022-07-27 DIAGNOSIS — K58.0 IRRITABLE BOWEL SYNDROME WITH DIARRHEA: ICD-10-CM

## 2022-07-27 DIAGNOSIS — M25.512 CHRONIC PAIN OF BOTH SHOULDERS: ICD-10-CM

## 2022-07-27 DIAGNOSIS — N18.30 STAGE 3 CHRONIC KIDNEY DISEASE, UNSPECIFIED WHETHER STAGE 3A OR 3B CKD (HCC): ICD-10-CM

## 2022-07-27 DIAGNOSIS — E78.5 HYPERLIPIDEMIA WITH TARGET LDL LESS THAN 100: ICD-10-CM

## 2022-07-27 DIAGNOSIS — Z00.00 MEDICARE ANNUAL WELLNESS VISIT, SUBSEQUENT: Primary | ICD-10-CM

## 2022-07-27 DIAGNOSIS — G25.81 RESTLESS LEG SYNDROME: ICD-10-CM

## 2022-07-27 DIAGNOSIS — Z98.1 S/P LUMBAR FUSION: ICD-10-CM

## 2022-07-27 DIAGNOSIS — K21.9 GASTROESOPHAGEAL REFLUX DISEASE WITHOUT ESOPHAGITIS: ICD-10-CM

## 2022-07-27 DIAGNOSIS — Z13.820 SCREENING FOR OSTEOPOROSIS: ICD-10-CM

## 2022-07-27 DIAGNOSIS — M54.50 CHRONIC BILATERAL LOW BACK PAIN WITHOUT SCIATICA: ICD-10-CM

## 2022-07-27 PROCEDURE — G8427 DOCREV CUR MEDS BY ELIG CLIN: HCPCS | Performed by: PEDIATRICS

## 2022-07-27 PROCEDURE — 3051F HG A1C>EQUAL 7.0%<8.0%: CPT | Performed by: PEDIATRICS

## 2022-07-27 PROCEDURE — 2022F DILAT RTA XM EVC RTNOPTHY: CPT | Performed by: PEDIATRICS

## 2022-07-27 PROCEDURE — 3017F COLORECTAL CA SCREEN DOC REV: CPT | Performed by: PEDIATRICS

## 2022-07-27 PROCEDURE — 1123F ACP DISCUSS/DSCN MKR DOCD: CPT | Performed by: PEDIATRICS

## 2022-07-27 PROCEDURE — 1090F PRES/ABSN URINE INCON ASSESS: CPT | Performed by: PEDIATRICS

## 2022-07-27 PROCEDURE — 99214 OFFICE O/P EST MOD 30 MIN: CPT | Performed by: PEDIATRICS

## 2022-07-27 PROCEDURE — 1036F TOBACCO NON-USER: CPT | Performed by: PEDIATRICS

## 2022-07-27 PROCEDURE — G0439 PPPS, SUBSEQ VISIT: HCPCS | Performed by: PEDIATRICS

## 2022-07-27 PROCEDURE — G8417 CALC BMI ABV UP PARAM F/U: HCPCS | Performed by: PEDIATRICS

## 2022-07-27 PROCEDURE — G8399 PT W/DXA RESULTS DOCUMENT: HCPCS | Performed by: PEDIATRICS

## 2022-07-27 ASSESSMENT — ENCOUNTER SYMPTOMS
EYE REDNESS: 0
CHEST TIGHTNESS: 0
SORE THROAT: 0
BLOOD IN STOOL: 0
EYE PAIN: 0
DIARRHEA: 1
STRIDOR: 0
ABDOMINAL PAIN: 0
SINUS PRESSURE: 0
COUGH: 0
NAUSEA: 0
EYE DISCHARGE: 0
COLOR CHANGE: 0
SHORTNESS OF BREATH: 0
WHEEZING: 0
RHINORRHEA: 0
VOMITING: 0
CONSTIPATION: 0
CHOKING: 0
TROUBLE SWALLOWING: 0
BACK PAIN: 1

## 2022-07-27 ASSESSMENT — PATIENT HEALTH QUESTIONNAIRE - PHQ9
3. TROUBLE FALLING OR STAYING ASLEEP: 1
2. FEELING DOWN, DEPRESSED OR HOPELESS: 0
8. MOVING OR SPEAKING SO SLOWLY THAT OTHER PEOPLE COULD HAVE NOTICED. OR THE OPPOSITE, BEING SO FIGETY OR RESTLESS THAT YOU HAVE BEEN MOVING AROUND A LOT MORE THAN USUAL: 0
SUM OF ALL RESPONSES TO PHQ QUESTIONS 1-9: 1
7. TROUBLE CONCENTRATING ON THINGS, SUCH AS READING THE NEWSPAPER OR WATCHING TELEVISION: 0
9. THOUGHTS THAT YOU WOULD BE BETTER OFF DEAD, OR OF HURTING YOURSELF: 0
SUM OF ALL RESPONSES TO PHQ QUESTIONS 1-9: 1
SUM OF ALL RESPONSES TO PHQ9 QUESTIONS 1 & 2: 0
SUM OF ALL RESPONSES TO PHQ QUESTIONS 1-9: 1
1. LITTLE INTEREST OR PLEASURE IN DOING THINGS: 0
5. POOR APPETITE OR OVEREATING: 0
6. FEELING BAD ABOUT YOURSELF - OR THAT YOU ARE A FAILURE OR HAVE LET YOURSELF OR YOUR FAMILY DOWN: 0
SUM OF ALL RESPONSES TO PHQ QUESTIONS 1-9: 1
4. FEELING TIRED OR HAVING LITTLE ENERGY: 0
10. IF YOU CHECKED OFF ANY PROBLEMS, HOW DIFFICULT HAVE THESE PROBLEMS MADE IT FOR YOU TO DO YOUR WORK, TAKE CARE OF THINGS AT HOME, OR GET ALONG WITH OTHER PEOPLE: 0

## 2022-07-27 ASSESSMENT — LIFESTYLE VARIABLES: HOW OFTEN DO YOU HAVE A DRINK CONTAINING ALCOHOL: NEVER

## 2022-07-27 NOTE — PATIENT INSTRUCTIONS
Personalized Preventive Plan for Rosy Stoll - 7/27/2022  Medicare offers a range of preventive health benefits. Some of the tests and screenings are paid in full while other may be subject to a deductible, co-insurance, and/or copay. Some of these benefits include a comprehensive review of your medical history including lifestyle, illnesses that may run in your family, and various assessments and screenings as appropriate. After reviewing your medical record and screening and assessments performed today your provider may have ordered immunizations, labs, imaging, and/or referrals for you. A list of these orders (if applicable) as well as your Preventive Care list are included within your After Visit Summary for your review. Other Preventive Recommendations:    A preventive eye exam performed by an eye specialist is recommended every 1-2 years to screen for glaucoma; cataracts, macular degeneration, and other eye disorders. A preventive dental visit is recommended every 6 months. Try to get at least 150 minutes of exercise per week or 10,000 steps per day on a pedometer . Order or download the FREE \"Exercise & Physical Activity: Your Everyday Guide\" from [employer]. Call 8-284.373.4547 or search [employer] online. You need 8651-4642 mg of calcium and 4901-1847 IU of vitamin D per day. It is possible to meet your calcium requirement with diet alone, but a vitamin D supplement is usually necessary to meet this goal.  When exposed to the sun, use a sunscreen that protects against both UVA and UVB radiation with an SPF of 30 or greater. Reapply every 2 to 3 hours or after sweating, drying off with a towel, or swimming. Always wear a seat belt when traveling in a car. Always wear a helmet when riding a bicycle or motorcycle.

## 2022-07-27 NOTE — PROGRESS NOTES
Medicare Annual Wellness Visit    Rosy Stoll is here for Medicare AWV    Assessment & Plan   Medicare annual wellness visit, subsequent    Recommendations for Preventive Services Due: see orders and patient instructions/AVS.  Recommended screening schedule for the next 5-10 years is provided to the patient in written form: see Patient Instructions/AVS.     Return for Medicare Annual Wellness Visit in 1 year. Subjective       Patient's complete Health Risk Assessment and screening values have been reviewed and are found in Flowsheets. The following problems were reviewed today and where indicated follow up appointments were made and/or referrals ordered.     Positive Risk Factor Screenings with Interventions:             General Health and ACP:  General  In general, how would you say your health is?: Fair  In the past 7 days, have you experienced any of the following: New or Increased Pain, New or Increased Fatigue, Loneliness, Social Isolation, Stress or Anger?: No  Do you get the social and emotional support that you need?: Yes  Do you have a Living Will?: (!) No    Advance Directives       Power of [address] [place] [address] Will ACP-Advance Directive ACP-Power of     Not on File [place] on 05/31/16 [address] [place] [address] Risk Interventions:  No Living Will: Patient will work on completing her living will    Health Habits/Nutrition:  Physical Activity: Insufficiently Active    Days of Exercise per Week: 1 day    Minutes of Exercise per Session: 20 min     Have you lost any weight without trying in the past 3 months?: No  Body mass index: (!) 38.8  Have you seen the dentist within the past year?: (!) No  Health Habits/Nutrition Interventions:  Dental exam overdue:  dental referral provided  Elevated BMI:  patient encouraged to follow a healthy diet / regular exercise to reduce BMI    Hearing/Vision:  Do you or your family notice any trouble with your hearing that hasn't been managed with hearing aids?: (!) Yes  Do you have difficulty driving, watching TV, or doing any of your daily activities because of your eyesight?: No  Have you had an eye exam within the past year?: Yes  No results found. Hearing/Vision Interventions:  Hearing concerns:  patient declines any further evaluation/treatment for hearing issues            Objective   Vitals:    07/27/22 1402   BP: 116/74   Site: Left Upper Arm   Position: Sitting   Cuff Size: Large Adult   Pulse: 57   Resp: 16   Temp: 97.6 °F (36.4 °C)   TempSrc: Temporal   Weight: 215 lb 9.6 oz (97.8 kg)   Height: 5' 2.5\" (1.588 m)      Body mass index is 38.81 kg/m². Allergies   Allergen Reactions    Demerol Hcl [Meperidine] Nausea And Vomiting    Victoza [Liraglutide] Other (See Comments)     Pancriatitis     Prior to Visit Medications    Medication Sig Taking? Authorizing Provider   atorvastatin (LIPITOR) 80 MG tablet Take 1 tablet by mouth daily Yes Shahnaz Martinez MD   traZODone (DESYREL) 100 MG tablet TAKE 1-2 TABLETS BY MOUTH NIGHTLY Yes Shahnaz Martinez MD   FLUoxetine (PROZAC) 40 MG capsule TAKE 1 CAPSULE EVERY DAY Yes Shahnaz Martinez MD   nystatin (MYCOSTATIN) 993345 UNIT/GM ointment APPLY TO THE AFFECTED AREA(S) TOPICALLY TWICE DAILY Yes Shahnaz Martinez MD   tiZANidine (ZANAFLEX) 2 MG tablet TAKE 1 TABLET EVERY 8 HOURS AS NEEDED FOR MUSCLE SPASM(S) Yes Shahnaz Martinez MD   gabapentin (NEURONTIN) 300 MG capsule Take 1 capsule by mouth 3 times daily.  Yes Shahnaz Martinez MD   b complex vitamins capsule Take 1 capsule by mouth daily Yes Historical Provider, MD   Cholecalciferol (VITAMIN D3) 50 MCG (2000 UT) CAPS Take by mouth daily Yes Historical Provider, MD   losartan (COZAAR) 100 MG tablet Take 1 tablet by mouth daily Yes Shahnaz Martinez MD   Handicap Placard MISC by Does not apply route Expires 3/26/2026 Yes Shahnaz Martinez MD   metFORMIN (GLUCOPHAGE) 500 MG tablet Take 500 mg by mouth 2 times daily (with meals) Yes Historical Provider, MD   NOVOLIN 70/30 (70-30) 100 UNIT/ML injection vial Inject into the skin 2 times daily (with meals) 10 U at night with food Yes Historical Provider, MD   aspirin 81 MG tablet Take 1 tablet by mouth nightly Yes Massimo Smith DO   glimepiride (AMARYL) 4 MG tablet Take 1 tablet by mouth every morning (before breakfast)  Yes Historical Provider, MD   omeprazole (PRILOSEC) 20 MG capsule Take 20 mg by mouth nightly Indications: GERD-Related Laryngitis  Yes Historical Provider, MD   oxyCODONE-acetaminophen (PERCOCET) 5-325 MG per tablet Take 2 tablets by mouth every 4 hours as needed (spondylisis) for up to 30 days.   Patient not taking: Reported on 9/27/2021  Leonarda Teague MD   BD INSULIN SYRINGE ULTRAFINE 31G X 15/64\" 0.5 ML MISC   Historical Provider, MD   ACCU-CHEK TARIQ PLUS strip   Historical Provider, MD IZAGUIRRE ULTRAFINE III SHORT PEN 31G X 8 MM [facility]   Historical Provider, MD   albuterol (PROVENTIL) (2.5 MG/3ML) 0.083% nebulizer solution Take 2.5 mg by nebulization every 4 hours as needed for Wheezing   Patient not taking: No sig reported  Casey Gonzalez MD       [facility] (Including outside providers/suppliers regularly involved in providing care):   Patient Care Team:  Leonarda Teague MD as PCP - General (Internal Medicine)  Leonarda Teague MD as PCP - [facility]  Joycelyn Enriquez MD as Consulting Physician (Nephrology)  Thais Turner MD as Consulting Physician (Pulmonology)  Susan Brunt, MD Annabell Moritz, MD as Consulting Physician (Endocrinology)  Nichole Farris MD as Consulting Physician (Gastroenterology)     Reviewed and updated this visit:  Tobacco  Allergies  Meds  Problems  Med Hx  Surg Hx  Soc Hx  Fam Hx

## 2022-07-27 NOTE — PROGRESS NOTES
Martita Wall (:  1950) is a 74 y.o. female,Established patient, here for evaluation of the following chief complaint(s):  Diabetes and Hypertension         ASSESSMENT/PLAN:    1. Essential hypertension  2. Hyperlipidemia with target LDL less than 100  3. Class 2 severe obesity due to excess calories with serious comorbidity and body mass index (BMI) of 38.0 to 38.9 in adult ([address]  4. Mild intermittent asthma without complication  5. Chronic seasonal allergic rhinitis due to pollen  6. KRISHNA (obstructive sleep apnea)  7. Central sleep apnea  8. Restless leg syndrome  9. Gastroesophageal reflux disease without esophagitis  10. Hiatal hernia  11. Irritable bowel syndrome with diarrhea  12. Moderate single current episode of major depressive disorder ([address]  13. Anxiety  14. Chronic bilateral low back pain without sciatica  15. Spondylolisthesis, unspecified spinal region  16. S/P lumbar fusion  17. Diabetes mellitus type 2 with complications ([address]  18. Diabetic nephropathy associated with type 2 diabetes mellitus ([address]  19. Stage 3 chronic kidney disease, unspecified whether stage 3a or 3b CKD ([address]  20. Diabetic polyneuropathy associated with type 2 diabetes mellitus ([address]  21. Generalized osteoarthritis  22. Primary osteoarthritis of left knee  23. Chronic pain of both shoulders  24. Right hip pain  25. B12 deficiency  26. Abnormal thyroid blood test  27. Anemia, unspecified type  28. Anemia due to stage 3 chronic kidney disease, unspecified whether stage 3a or 3b CKD ([address]  29. Post-menopausal  -     DEXA BONE DENSITY 2 SITES; Future  30. Screening for osteoporosis  -     DEXA BONE DENSITY 2 SITES;  Future    Reviewed recent labs  Obtain labs as ordered in 3 to 4 months  Follow-up with cardiology, Dr. Catracho Lopez as scheduled  Monitor blood pressure occasionally  Continue weight reduction encouraged  Continue Singulair  Continue Servo vent and follow-up with pulmonary  Continue gabapentin for restless leg syndrome  Continue PPI  Colonoscopy due in 2024  Continue Prozac and consider counseling  Continue gabapentin and tizanidine for low back pain  Follow-up with endocrinology as scheduled  Consider nephrology consult if kidney function worsens  Follow-up with Ortho  Continue B12 supplement  Obtain mammogram as previously ordered  Obtain DEXA scan  Tdap and shingles vaccines recommended  Call with concerns        Return in about 4 months (around 11/27/2022) for routine follow up. Subjective     HPI    Patient presents today for routine follow-up of her chronic medical problems which include hypertension, hyperlipidemia, obesity, asthma, allergies, KRISHNA, central sleep apnea, restless leg syndrome, GERD, depression, anxiety and chronic low back pain from spondylolisthesis status post lumbar fusion. She also has a history of type 2 diabetes with chronic kidney disease and neuropathy. She has generalized osteoarthritis and pain particularly in the left knee. She does get shoulder pain and right hip pain. She does have a history of B12 deficiency, abnormal thyroid labs and history of anemia suspected to be anemia of chronic disease. Overall she states she has been doing well. Blood pressure is well controlled with losartan. She denies any side effects. She does continue on Lipitor for history of high for lipidemia and denies any other side effects from her medication. She does have a history of obesity and continues to lose weight slowly over time. Her asthma and allergies are well controlled with Singulair. She does have obstructive sleep apnea and central sleep apnea that is now treated with a Servo vent. She does have a pulmonologist, Dr. Bridges Daughters she does have an appointment with him next week. She does also follow with cardiology, Dr. Jeana Yao and saw him recently. She does have a history of restless leg syndrome that is controlled with Neurontin. She does have GERD that is controlled with Prilosec. She did have an EGD and colonoscopy in 2021 and she does have a small 1 to 2 cm hiatal hernia that was noted on EGD. Her colonoscopy did show diverticulosis and a redundant colon. It was recommended that she have a repeat colonoscopy in 2024. She does have a history of irritable bowel syndrome and gets intermittent diarrhea from this. She has started Metamucil and this is helpful. She does have a history of depression and anxiety that is well controlled with Prozac daily and Ativan as needed. She has not used Ativan in a very long time. She does have a history of chronic low back pain and underwent a posterior lumbar interbody fusion at L4-5 due to spondylolisthesis back in 2016. She does still get intermittent low back pain that bothers her occasionally but this is nothing too concerning. She does continue on Neurontin and ties Billy Sallies as needed and this is helpful. She does have a history of type 2 diabetes with chronic kidney disease and neuropathy that is followed by endocrinology, Dr. Nafisa Cody. She has an appointment next week. She does continue to take insulin 70/30 at 10 units daily and she drops this down to 7 units if her blood sugar is less than 100. She does continue on glimepiride and metformin. She does monitor her blood sugars regularly. Her eye exam is up-to-date with Dr. Jim De Anda. She does get some occasional numbness and tingling in her feet but nothing too concerning. She has seen nephrology in the past but has not seen them in some time and prefers not to see anyone if necessary. She does have generalized osteoarthritis and this is very severe in the left knee. She did see Dr. Mario More years ago for this and I did recommend that she follow-up with him. She also gets some shoulder pain intermittently. She does have a history of B12 deficiency and takes an over-the-counter B12 supplement.     She did have abnormal thyroid labs in the past.  She did have a thyroid ultrasound that showed that her gland was mildly heterogeneous and diminutive. Endocrinology advised her that they will continue to monitor this over time. She does have a history of anemia that is likely secondary to anemia of chronic disease from her CKD. She has no other concerns at this time. cmw        Review of Systems   Constitutional:  Negative for activity change, appetite change, fatigue, fever and unexpected weight change. HENT:  Negative for congestion, ear pain, postnasal drip, rhinorrhea, sinus pressure, sneezing, sore throat, tinnitus and trouble swallowing. Eyes:  Negative for pain, discharge, redness and visual disturbance. Respiratory:  Negative for cough, choking, chest tightness, shortness of breath, wheezing and stridor. Asthma well controlled with singulair daily and albuterol as needed. KRISHNA / central sleep apnea controlled with Servovent nightly - she follows up with Dr. Deirdre Osman. Cardiovascular:  Negative for chest pain, palpitations and leg swelling. Gastrointestinal:  Positive for diarrhea (secondary to irritable bowel - improved with fiber). Negative for abdominal pain, blood in stool, constipation, nausea and vomiting. GERD treated with omeprazole and tums occasionally   Endocrine: Negative for polydipsia, polyphagia and polyuria. History of abnormal thyroid labs    Genitourinary:  Negative for dysuria, pelvic pain and urgency. Musculoskeletal:  Positive for arthralgias (occasional bilateral shoulder pain / right hip pain / left knee pain) and back pain (Mid back pain occasionally). Negative for myalgias and neck pain. Skin:  Positive for rash (only a few excoriated macules over the arms and back - c/w neurodermatitis - much improved from before ). Negative for color change and wound. Allergic/Immunologic: Negative for immunocompromised state. Neurological:  Positive for tremors (shaky at times), weakness and numbness.  Negative for dizziness, Decreased range of motion. Back:       Right hip: Tenderness present. Normal strength. Left hip: Tenderness present. Normal strength. Right knee: Normal.      Left knee: Decreased range of motion. Tenderness present over the medial joint line. Right lower leg: No edema. Left lower leg: No edema. Lymphadenopathy:      Cervical: No cervical adenopathy. Skin:     General: Skin is warm. Capillary Refill: Capillary refill takes less than 2 seconds. Findings: No rash. Neurological:      General: No focal deficit present. Mental Status: She is alert and oriented to person, place, and time. Cranial Nerves: No cranial nerve deficit. Motor: No abnormal muscle tone. Coordination: Coordination normal.      Deep Tendon Reflexes: Reflexes normal.   Psychiatric:         Attention and Perception: Attention normal.         Mood and Affect: Mood normal. Mood is not anxious or depressed. Speech: Speech normal.         Behavior: Behavior normal.         Thought Content: Thought content normal.         Judgment: Judgment normal.            An electronic signature was used to authenticate this note.     --Lopez Littlejohn MD

## 2022-08-16 DIAGNOSIS — M43.10 SPONDYLOLISTHESIS, UNSPECIFIED SPINAL REGION: ICD-10-CM

## 2022-08-16 DIAGNOSIS — F32.0 CURRENT MILD EPISODE OF MAJOR DEPRESSIVE DISORDER WITHOUT PRIOR EPISODE (HCC): ICD-10-CM

## 2022-08-16 DIAGNOSIS — B37.2 YEAST DERMATITIS: ICD-10-CM

## 2022-08-17 RX ORDER — FLUOXETINE HYDROCHLORIDE 40 MG/1
40 CAPSULE ORAL DAILY
Qty: 90 CAPSULE | Refills: 1 | Status: SHIPPED | OUTPATIENT
Start: 2022-08-17 | End: 2023-08-17

## 2022-08-17 RX ORDER — TIZANIDINE 2 MG/1
2 TABLET ORAL EVERY 8 HOURS PRN
Qty: 270 TABLET | Refills: 1 | Status: SHIPPED | OUTPATIENT
Start: 2022-08-17 | End: 2023-08-17

## 2022-08-17 RX ORDER — NYSTATIN 100000 U/G
OINTMENT TOPICAL
Qty: 90 G | Refills: 1 | Status: SHIPPED | OUTPATIENT
Start: 2022-08-17 | End: 2023-08-17

## 2022-08-17 NOTE — TELEPHONE ENCOUNTER
LOV 7-27-22  LRF 4-4-22    Health Maintenance   Topic Date Due    DTaP/Tdap/Td vaccine (1 - Tdap) Never done    Shingles vaccine (1 of 2) Never done    Breast cancer screen  12/02/2021    Flu vaccine (1) 09/01/2022    Diabetic retinal exam  10/28/2022    Diabetic foot exam  04/14/2023    A1C test (Diabetic or Prediabetic)  07/21/2023    Lipids  07/21/2023    Depression Monitoring  07/27/2023    Annual Wellness Visit (AWV)  07/28/2023    Colorectal Cancer Screen  11/08/2031    Pneumococcal 65+ years Vaccine  Completed    COVID-19 Vaccine  Completed    Hepatitis C screen  Completed    DEXA (modify frequency per FRAX score)  Addressed    Hepatitis A vaccine  Aged Out    Hib vaccine  Aged Out    Meningococcal (ACWY) vaccine  Aged Out             (applicable per patient's age: Cancer Screenings, Depression Screening, Fall Risk Screening, Immunizations)    Hemoglobin A1C (%)   Date Value   07/21/2022 7.3 (H)   02/07/2022 8.2 (H)   07/01/2021 7.1 (H)     Microalb/Crt.  Ratio (mcg/mg creat)   Date Value   07/01/2021 CANNOT BE CALCULATED     LDL Cholesterol (mg/dL)   Date Value   07/21/2022 79     LDL Calculated (mg/dL)   Date Value   07/29/2013 82     AST (U/L)   Date Value   07/21/2022 20     ALT (U/L)   Date Value   07/21/2022 17     BUN (mg/dL)   Date Value   07/21/2022 24 (H)      (goal A1C is < 7)   (goal LDL is <100) need 30-50% reduction from baseline     BP Readings from Last 3 Encounters:   07/27/22 116/74   07/27/22 116/74   04/14/22 124/70    (goal /80)      All Future Testing planned in [facility]:  Lab Frequency Next Occurrence   Hemoglobin A1C Once 03/07/2022   PIETRO GARO DIGITAL SCREEN BILATERAL Once 11/24/2021   EGD Once 03/01/2022   DEXA BONE DENSITY 2 SITES Once 07/27/2022       Next Visit Date:  Future Appointments   Date Time Provider Eva Dent   12/1/2022  2:30 PM MD Ángel Vincent [facility]   7/28/2023  2:00 PM hCloé Shafer MD [facility] [address] [facility]   7/28/2023  2:30 PM Chloé Shafer MD [facility] [address] [facility]            Patient Active Problem List:     Restless leg syndrome     KRISHNA on CPAP     Diabetes mellitus type 2 with complications ([facility]     Hyperlipidemia with target LDL less than 100     Asthma     Allergic rhinitis     Essential hypertension     Major depression     Anxiety     Spondylisthesis     S/P lumbar fusion     Gastroesophageal reflux disease     Diabetic nephropathy associated with type 2 diabetes mellitus (HCC)     Moderate single current episode of major depressive disorder ([facility]     Irritable bowel syndrome with diarrhea     Morbid obesity with BMI of 40.0-44.9, adult (HCC)     Chronic kidney disease, stage III (moderate) (HCC)     Diabetic polyneuropathy associated with type 2 diabetes mellitus (HCC)     B12 deficiency     Central sleep apnea     GERD (gastroesophageal reflux disease)     Dysphagia     Anemia due to stage 3 chronic kidney disease (HCC)     Abdominal pain, epigastric     Moderate obesity     Severe obesity (BMI 35.0-35.9 with comorbidity) (HCC)     Absolute anemia

## 2022-08-17 NOTE — TELEPHONE ENCOUNTER
Last visit: 7/27/22  Last Med refill: 4/4/22 all meds  Does patient have enough medication for 72 hours: Yes    Next Visit Date:  Future Appointments   Date Time Provider Eva Dent   12/1/2022  2:30 PM MD Ángel Marvin  [facility]   7/28/2023  2:00 PM MD Ángel Marvin  [facility]   7/28/2023  2:30 PM Azul Turpin MD [address] Maintenance   Topic Date Due    DTaP/Tdap/Td vaccine (1 - Tdap) Never done    Shingles vaccine (1 of 2) Never done    Breast cancer screen  12/02/2021    Flu vaccine (1) 09/01/2022    Diabetic retinal exam  10/28/2022    Diabetic foot exam  04/14/2023    A1C test (Diabetic or Prediabetic)  07/21/2023    Lipids  07/21/2023    Depression Monitoring  07/27/2023    Annual Wellness Visit (AWV)  07/28/2023    Colorectal Cancer Screen  11/08/2031    Pneumococcal 65+ years Vaccine  Completed    COVID-19 Vaccine  Completed    Hepatitis C screen  Completed    DEXA (modify frequency per FRAX score)  Addressed    Hepatitis A vaccine  Aged Out    Hib vaccine  Aged Out    Meningococcal (ACWY) vaccine  Aged Out       Hemoglobin A1C (%)   Date Value   07/21/2022 7.3 (H)   02/07/2022 8.2 (H)   07/01/2021 7.1 (H)             ( goal A1C is < 7)   Microalb/Crt.  Ratio (mcg/mg creat)   Date Value   07/01/2021 CANNOT BE CALCULATED     LDL Cholesterol (mg/dL)   Date Value   07/21/2022 79   07/01/2021 63     LDL Calculated (mg/dL)   Date Value   07/29/2013 82       (goal LDL is <100)   AST (U/L)   Date Value   07/21/2022 20     ALT (U/L)   Date Value   07/21/2022 17     BUN (mg/dL)   Date Value   07/21/2022 24 (H)     BP Readings from Last 3 Encounters:   07/27/22 116/74   07/27/22 116/74   04/14/22 124/70          (goal 120/80)    All Future Testing planned in [facility]  Lab Frequency Next Occurrence   Hemoglobin A1C Once 03/07/2022   PIETRO GARO DIGITAL SCREEN BILATERAL Once 11/24/2021   EGD Once 03/01/2022   DEXA BONE DENSITY 2 SITES Once 07/27/2022               Patient Active Problem List:     Restless leg syndrome     KRISHNA on CPAP     Diabetes mellitus type 2 with complications ([facility]     Hyperlipidemia with target LDL less than 100     Asthma     Allergic rhinitis     Essential hypertension     Major depression     Anxiety     Spondylisthesis     S/P lumbar fusion     Gastroesophageal reflux disease     Diabetic nephropathy associated with type 2 diabetes mellitus (HCC)     Moderate single current episode of major depressive disorder ([facility]     Irritable bowel syndrome with diarrhea     Morbid obesity with BMI of 40.0-44.9, adult (HCC)     Chronic kidney disease, stage III (moderate) ([facility])     Diabetic polyneuropathy associated with type 2 diabetes mellitus ([facility])     B12 deficiency     Central sleep apnea     GERD (gastroesophageal reflux disease)     Dysphagia     Anemia due to stage 3 chronic kidney disease ([facility])     Abdominal pain, epigastric     Moderate obesity     Severe obesity (BMI 35.0-35.9 with comorbidity) ([facility])     Absolute anemia

## 2022-08-27 DIAGNOSIS — I10 HYPERTENSION, UNSPECIFIED TYPE: ICD-10-CM

## 2022-08-29 NOTE — TELEPHONE ENCOUNTER
Last visit: 7/27/22  Last Med refill: 9/2/2021  Does patient have enough medication for 72 hours: Yes    Next Visit Date:  Future Appointments   Date Time Provider Eva Dent   12/1/2022  2:30 PM MD Ángel Barrett   7/28/2023  2:00 PM MD Ángel Barrett   7/28/2023  2:30 PM Amanda Rushing MD [address] Maintenance   Topic Date Due    DTaP/Tdap/Td vaccine (1 - Tdap) Never done    Shingles vaccine (1 of 2) Never done    Breast cancer screen  12/02/2021    Flu vaccine (1) 09/01/2022    Diabetic retinal exam  10/28/2022    Diabetic foot exam  04/14/2023    A1C test (Diabetic or Prediabetic)  07/21/2023    Lipids  07/21/2023    Depression Monitoring  07/27/2023    Annual Wellness Visit (AWV)  07/28/2023    Colorectal Cancer Screen  11/08/2031    Pneumococcal 65+ years Vaccine  Completed    COVID-19 Vaccine  Completed    Hepatitis C screen  Completed    DEXA (modify frequency per FRAX score)  Addressed    Hepatitis A vaccine  Aged Out    Hib vaccine  Aged Out    Meningococcal (ACWY) vaccine  Aged Out       Hemoglobin A1C (%)   Date Value   07/21/2022 7.3 (H)   02/07/2022 8.2 (H)   07/01/2021 7.1 (H)             ( goal A1C is < 7)   Microalb/Crt.  Ratio (mcg/mg creat)   Date Value   07/01/2021 CANNOT BE CALCULATED     LDL Cholesterol (mg/dL)   Date Value   07/21/2022 79   07/01/2021 63     LDL Calculated (mg/dL)   Date Value   07/29/2013 82       (goal LDL is <100)   AST (U/L)   Date Value   07/21/2022 20     ALT (U/L)   Date Value   07/21/2022 17     BUN (mg/dL)   Date Value   07/21/2022 24 (H)     BP Readings from Last 3 Encounters:   07/27/22 116/74   07/27/22 116/74   04/14/22 124/70          (goal 120/80)    All Future Testing planned in [facility]  Lab Frequency Next Occurrence   Hemoglobin A1C Once 03/07/2022   PIETRO GARO DIGITAL SCREEN BILATERAL Once 11/24/2021   EGD Once 03/01/2022   DEXA BONE DENSITY 2 SITES Once 07/27/2022               Patient Active Problem List:     Restless leg syndrome     KRISHNA on CPAP     Diabetes mellitus type 2 with complications ([facility] [address]     Hyperlipidemia with target LDL less than 100     Asthma     Allergic rhinitis     Essential hypertension     Major depression     Anxiety     Spondylisthesis     S/P lumbar fusion     Gastroesophageal reflux disease     Diabetic nephropathy associated with type 2 diabetes mellitus (HCC)     Moderate single current episode of major depressive disorder ([facility] [address]     Irritable bowel syndrome with diarrhea     Morbid obesity with BMI of 40.0-44.9, adult (HCC)     Chronic kidney disease, stage III (moderate) ([facility])     Diabetic polyneuropathy associated with type 2 diabetes mellitus ([facility])     B12 deficiency     Central sleep apnea     GERD (gastroesophageal reflux disease)     Dysphagia     Anemia due to stage 3 chronic kidney disease ([facility])     Abdominal pain, epigastric     Moderate obesity     Severe obesity (BMI 35.0-35.9 with comorbidity) ([facility])     Absolute anemia

## 2022-08-30 RX ORDER — LOSARTAN POTASSIUM 100 MG/1
100 TABLET ORAL DAILY
Qty: 90 TABLET | Refills: 1 | Status: SHIPPED | OUTPATIENT
Start: 2022-08-30

## 2022-11-22 DIAGNOSIS — G25.81 RESTLESS LEG SYNDROME: ICD-10-CM

## 2022-11-22 DIAGNOSIS — E78.5 HYPERLIPIDEMIA, UNSPECIFIED HYPERLIPIDEMIA TYPE: ICD-10-CM

## 2022-11-22 DIAGNOSIS — F32.9 MAJOR DEPRESSIVE DISORDER WITH CURRENT ACTIVE EPISODE, UNSPECIFIED DEPRESSION EPISODE SEVERITY, UNSPECIFIED WHETHER RECURRENT: ICD-10-CM

## 2022-11-23 RX ORDER — ATORVASTATIN CALCIUM 80 MG/1
80 TABLET, FILM COATED ORAL DAILY
Qty: 90 TABLET | Refills: 1 | Status: SHIPPED | OUTPATIENT
Start: 2022-11-23 | End: 2023-11-23

## 2022-11-23 RX ORDER — GABAPENTIN 300 MG/1
300 CAPSULE ORAL 3 TIMES DAILY
Qty: 270 CAPSULE | Refills: 1 | Status: SHIPPED | OUTPATIENT
Start: 2022-11-23 | End: 2023-11-23

## 2022-11-23 RX ORDER — TRAZODONE HYDROCHLORIDE 100 MG/1
TABLET ORAL
Qty: 180 TABLET | Refills: 1 | Status: SHIPPED | OUTPATIENT
Start: 2022-11-23 | End: 2023-11-23

## 2022-11-23 NOTE — TELEPHONE ENCOUNTER
LOV 7-27-22  LRF Lipitor & Trazodone 5-31-23, Gabapentin 2-28-22    Health Maintenance   Topic Date Due    DTaP/Tdap/Td vaccine (1 - Tdap) Never done    Shingles vaccine (2 of 3) 09/28/2012    Breast cancer screen  12/02/2021    COVID-19 Vaccine (5 - Booster for Moderna series) 05/31/2022    Flu vaccine (1) 08/01/2022    Diabetic retinal exam  10/28/2022    Diabetic foot exam  04/14/2023    A1C test (Diabetic or Prediabetic)  07/21/2023    Lipids  07/21/2023    Depression Monitoring  07/27/2023    Annual Wellness Visit (AWV)  07/28/2023    Colorectal Cancer Screen  11/08/2031    Pneumococcal 65+ years Vaccine  Completed    Hepatitis C screen  Completed    DEXA (modify frequency per FRAX score)  Addressed    Hepatitis A vaccine  Aged Out    Hib vaccine  Aged Out    Meningococcal (ACWY) vaccine  Aged Out             (applicable per patient's age: Cancer Screenings, Depression Screening, Fall Risk Screening, Immunizations)    Hemoglobin A1C (%)   Date Value   07/21/2022 7.3 (H)   02/07/2022 8.2 (H)   07/01/2021 7.1 (H)     Microalb/Crt.  Ratio (mcg/mg creat)   Date Value   07/01/2021 CANNOT BE CALCULATED     LDL Cholesterol (mg/dL)   Date Value   07/21/2022 79     LDL Calculated (mg/dL)   Date Value   07/29/2013 82     AST (U/L)   Date Value   07/21/2022 20     ALT (U/L)   Date Value   07/21/2022 17     BUN (mg/dL)   Date Value   07/21/2022 24 (H)      (goal A1C is < 7)   (goal LDL is <100) need 30-50% reduction from baseline     BP Readings from Last 3 Encounters:   07/27/22 116/74   07/27/22 116/74   04/14/22 124/70    (goal /80)      All Future Testing planned in [facility]:  Lab Frequency Next Occurrence   Hemoglobin A1C Once 03/07/2022   EGD Once 03/01/2022   DEXA BONE DENSITY 2 SITES Once 07/27/2022       Next Visit Date:  Future Appointments   Date Time Provider Eva Dent   12/1/2022  2:30 PM MD Ángel Roque [facility] [address] [facility]   7/28/2023  2:00 PM Chaya Dwyer MD Ángel Fischer PC [facility]   7/28/2023  2:30 PM MD Gregorio Major Robyn [facility]            Patient Active Problem List:     Restless leg syndrome     KRISHNA on CPAP     Diabetes mellitus type 2 with complications ([facility] [address]     Hyperlipidemia with target LDL less than 100     Asthma     Allergic rhinitis     Essential hypertension     Major depression     Anxiety     Spondylisthesis     S/P lumbar fusion     Gastroesophageal reflux disease     Diabetic nephropathy associated with type 2 diabetes mellitus ([facility])     Moderate single current episode of major depressive disorder ([facility] [address]     Irritable bowel syndrome with diarrhea     Morbid obesity with BMI of 40.0-44.9, adult (HCC)     Chronic kidney disease, stage III (moderate) ([facility])     Diabetic polyneuropathy associated with type 2 diabetes mellitus ([facility])     B12 deficiency     Central sleep apnea     GERD (gastroesophageal reflux disease)     Dysphagia     Anemia due to stage 3 chronic kidney disease (HCC)     Abdominal pain, epigastric     Moderate obesity     Severe obesity (BMI 35.0-35.9 with comorbidity) (HCC)     Absolute anemia

## 2022-11-29 ENCOUNTER — HOSPITAL ENCOUNTER (OUTPATIENT)
Age: 72
Setting detail: SPECIMEN
Discharge: HOME | End: 2022-11-29

## 2022-11-29 DIAGNOSIS — E11.8 DIABETES MELLITUS TYPE 2 WITH COMPLICATIONS (HCC): ICD-10-CM

## 2022-11-29 DIAGNOSIS — I10 HYPERTENSION, UNSPECIFIED TYPE: ICD-10-CM

## 2022-11-29 DIAGNOSIS — E66.01 CLASS 2 SEVERE OBESITY DUE TO EXCESS CALORIES WITH SERIOUS COMORBIDITY AND BODY MASS INDEX (BMI) OF 38.0 TO 38.9 IN ADULT (HCC): ICD-10-CM

## 2022-11-29 DIAGNOSIS — E78.5 HYPERLIPIDEMIA WITH TARGET LDL LESS THAN 100: ICD-10-CM

## 2022-11-29 DIAGNOSIS — Z86.2 HISTORY OF ANEMIA: ICD-10-CM

## 2022-11-29 DIAGNOSIS — E78.5 HYPERLIPIDEMIA, UNSPECIFIED HYPERLIPIDEMIA TYPE: Primary | ICD-10-CM

## 2022-11-29 LAB
HCT VFR BLD CALC: 33.5 % (ref 36.3–47.1)
HEMOGLOBIN: 10.6 G/DL (ref 11.9–15.1)
MCH RBC QN AUTO: 28.6 PG (ref 25.2–33.5)
MCHC RBC AUTO-ENTMCNC: 31.6 G/DL (ref 28.4–34.8)
MCV RBC AUTO: 90.5 FL (ref 82.6–102.9)
NRBC AUTOMATED: 0 PER 100 WBC
PDW BLD-RTO: 14 % (ref 11.8–14.4)
PLATELET # BLD: 237 K/UL (ref 138–453)
PMV BLD AUTO: 10.1 FL (ref 8.1–13.5)
RBC # BLD: 3.7 M/UL (ref 3.95–5.11)
WBC # BLD: 8.7 K/UL (ref 3.5–11.3)

## 2022-11-30 LAB
ALBUMIN SERPL-MCNC: 3.9 G/DL (ref 3.5–5.2)
ALBUMIN/GLOBULIN RATIO: 1.4 (ref 1–2.5)
ALP BLD-CCNC: 160 U/L (ref 35–104)
ALT SERPL-CCNC: 19 U/L (ref 5–33)
ANION GAP SERPL CALCULATED.3IONS-SCNC: 14 MMOL/L (ref 9–17)
AST SERPL-CCNC: 19 U/L
BILIRUB SERPL-MCNC: 0.2 MG/DL (ref 0.3–1.2)
BUN BLDV-MCNC: 27 MG/DL (ref 8–23)
CALCIUM SERPL-MCNC: 9.2 MG/DL (ref 8.6–10.4)
CHLORIDE BLD-SCNC: 106 MMOL/L (ref 98–107)
CO2: 23 MMOL/L (ref 20–31)
CREAT SERPL-MCNC: 1.39 MG/DL (ref 0.5–0.9)
ESTIMATED AVERAGE GLUCOSE: 160 MG/DL
FERRITIN: 30 NG/ML (ref 13–150)
GFR SERPL CREATININE-BSD FRML MDRD: 40 ML/MIN/1.73M2
GLUCOSE BLD-MCNC: 115 MG/DL (ref 70–99)
HBA1C MFR BLD: 7.2 % (ref 4–6)
IRON SATURATION: 15 % (ref 20–55)
IRON: 51 UG/DL (ref 37–145)
POTASSIUM SERPL-SCNC: 5.1 MMOL/L (ref 3.7–5.3)
SODIUM BLD-SCNC: 143 MMOL/L (ref 135–144)
TOTAL IRON BINDING CAPACITY: 334 UG/DL (ref 250–450)
TOTAL PROTEIN: 6.6 G/DL (ref 6.4–8.3)
TSH SERPL DL<=0.05 MIU/L-ACNC: 4.7 UIU/ML (ref 0.3–5)
UNSATURATED IRON BINDING CAPACITY: 283 UG/DL (ref 112–347)

## 2022-12-01 ENCOUNTER — OFFICE VISIT (OUTPATIENT)
Dept: FAMILY MEDICINE CLINIC | Age: 72
End: 2022-12-01

## 2022-12-01 VITALS
WEIGHT: 216 LBS | HEART RATE: 62 BPM | DIASTOLIC BLOOD PRESSURE: 78 MMHG | BODY MASS INDEX: 38.88 KG/M2 | SYSTOLIC BLOOD PRESSURE: 114 MMHG | RESPIRATION RATE: 16 BRPM | TEMPERATURE: 96.5 F

## 2022-12-01 DIAGNOSIS — K44.9 HIATAL HERNIA: ICD-10-CM

## 2022-12-01 DIAGNOSIS — J45.20 MILD INTERMITTENT ASTHMA WITHOUT COMPLICATION: ICD-10-CM

## 2022-12-01 DIAGNOSIS — F32.1 MODERATE SINGLE CURRENT EPISODE OF MAJOR DEPRESSIVE DISORDER (HCC): ICD-10-CM

## 2022-12-01 DIAGNOSIS — M54.50 CHRONIC BILATERAL LOW BACK PAIN WITHOUT SCIATICA: ICD-10-CM

## 2022-12-01 DIAGNOSIS — E11.42 DIABETIC POLYNEUROPATHY ASSOCIATED WITH TYPE 2 DIABETES MELLITUS (HCC): ICD-10-CM

## 2022-12-01 DIAGNOSIS — G47.33 OSA (OBSTRUCTIVE SLEEP APNEA): ICD-10-CM

## 2022-12-01 DIAGNOSIS — E78.5 HYPERLIPIDEMIA WITH TARGET LDL LESS THAN 100: ICD-10-CM

## 2022-12-01 DIAGNOSIS — G89.29 CHRONIC BILATERAL LOW BACK PAIN WITHOUT SCIATICA: ICD-10-CM

## 2022-12-01 DIAGNOSIS — K21.9 GASTROESOPHAGEAL REFLUX DISEASE WITHOUT ESOPHAGITIS: ICD-10-CM

## 2022-12-01 DIAGNOSIS — M17.12 PRIMARY OSTEOARTHRITIS OF LEFT KNEE: ICD-10-CM

## 2022-12-01 DIAGNOSIS — M25.511 CHRONIC PAIN OF BOTH SHOULDERS: ICD-10-CM

## 2022-12-01 DIAGNOSIS — Z12.31 ENCOUNTER FOR SCREENING MAMMOGRAM FOR BREAST CANCER: ICD-10-CM

## 2022-12-01 DIAGNOSIS — E53.8 B12 DEFICIENCY: ICD-10-CM

## 2022-12-01 DIAGNOSIS — M15.9 GENERALIZED OSTEOARTHRITIS: ICD-10-CM

## 2022-12-01 DIAGNOSIS — M25.512 CHRONIC PAIN OF BOTH SHOULDERS: ICD-10-CM

## 2022-12-01 DIAGNOSIS — J30.1 CHRONIC SEASONAL ALLERGIC RHINITIS DUE TO POLLEN: ICD-10-CM

## 2022-12-01 DIAGNOSIS — G25.81 RESTLESS LEG SYNDROME: ICD-10-CM

## 2022-12-01 DIAGNOSIS — Z78.0 POSTMENOPAUSAL: ICD-10-CM

## 2022-12-01 DIAGNOSIS — R79.89 ABNORMAL THYROID BLOOD TEST: ICD-10-CM

## 2022-12-01 DIAGNOSIS — G47.31 CENTRAL SLEEP APNEA: ICD-10-CM

## 2022-12-01 DIAGNOSIS — G89.29 CHRONIC PAIN OF BOTH SHOULDERS: ICD-10-CM

## 2022-12-01 DIAGNOSIS — F41.9 ANXIETY: ICD-10-CM

## 2022-12-01 DIAGNOSIS — M25.551 RIGHT HIP PAIN: ICD-10-CM

## 2022-12-01 DIAGNOSIS — E66.01 CLASS 2 SEVERE OBESITY DUE TO EXCESS CALORIES WITH SERIOUS COMORBIDITY AND BODY MASS INDEX (BMI) OF 38.0 TO 38.9 IN ADULT (HCC): ICD-10-CM

## 2022-12-01 DIAGNOSIS — N18.30 ANEMIA DUE TO STAGE 3 CHRONIC KIDNEY DISEASE, UNSPECIFIED WHETHER STAGE 3A OR 3B CKD (HCC): ICD-10-CM

## 2022-12-01 DIAGNOSIS — M43.10 SPONDYLOLISTHESIS, UNSPECIFIED SPINAL REGION: ICD-10-CM

## 2022-12-01 DIAGNOSIS — Z98.1 S/P LUMBAR FUSION: ICD-10-CM

## 2022-12-01 DIAGNOSIS — N18.30 STAGE 3 CHRONIC KIDNEY DISEASE, UNSPECIFIED WHETHER STAGE 3A OR 3B CKD (HCC): ICD-10-CM

## 2022-12-01 DIAGNOSIS — E11.8 DIABETES MELLITUS TYPE 2 WITH COMPLICATIONS (HCC): ICD-10-CM

## 2022-12-01 DIAGNOSIS — Z13.820 SCREENING FOR OSTEOPOROSIS: ICD-10-CM

## 2022-12-01 DIAGNOSIS — K58.0 IRRITABLE BOWEL SYNDROME WITH DIARRHEA: ICD-10-CM

## 2022-12-01 DIAGNOSIS — I10 ESSENTIAL HYPERTENSION: Primary | ICD-10-CM

## 2022-12-01 DIAGNOSIS — D63.1 ANEMIA DUE TO STAGE 3 CHRONIC KIDNEY DISEASE, UNSPECIFIED WHETHER STAGE 3A OR 3B CKD (HCC): ICD-10-CM

## 2022-12-01 ASSESSMENT — ENCOUNTER SYMPTOMS
CONSTIPATION: 0
NAUSEA: 0
EYE REDNESS: 0
RHINORRHEA: 0
DIARRHEA: 1
WHEEZING: 0
SHORTNESS OF BREATH: 0
CHEST TIGHTNESS: 0
CHOKING: 0
COUGH: 0
SORE THROAT: 0
EYE PAIN: 0
EYE DISCHARGE: 0
BLOOD IN STOOL: 0
STRIDOR: 0
SINUS PRESSURE: 0
COLOR CHANGE: 0
VOMITING: 0
ABDOMINAL PAIN: 0
TROUBLE SWALLOWING: 0
BACK PAIN: 1

## 2022-12-01 NOTE — PROGRESS NOTES
Chrissy Chaney (:  1950) is a 74 y.o. female,Established patient, here for evaluation of the following chief complaint(s):    Diabetes, Hypertension, and Cholesterol Problem         ASSESSMENT/PLAN:    1. Essential hypertension  -     Comprehensive Metabolic Panel; Future  2. Hyperlipidemia with target LDL less than 100  -     Lipid Panel; Future  3. Class 2 severe obesity due to excess calories with serious comorbidity and body mass index (BMI) of 38.0 to 38.9 in adult ([facility]  4. Mild intermittent asthma without complication  5. Chronic seasonal allergic rhinitis due to pollen  6. KRISHNA (obstructive sleep apnea)  7. Central sleep apnea  8. Restless leg syndrome  9. Gastroesophageal reflux disease without esophagitis  10. Hiatal hernia  11. Irritable bowel syndrome with diarrhea  12. Moderate single current episode of major depressive disorder ([facility]  13. Anxiety  14. Chronic bilateral low back pain without sciatica  15. Spondylolisthesis, unspecified spinal region  16. S/P lumbar fusion  17. Diabetes mellitus type 2 with complications (HCC)  -     Hemoglobin A1C; Future  18. Stage 3 chronic kidney disease, unspecified whether stage 3a or 3b CKD ([facility]  19. Diabetic polyneuropathy associated with type 2 diabetes mellitus ([facility]  20. Generalized osteoarthritis  21. Primary osteoarthritis of left knee  22. Chronic pain of both shoulders  23. Right hip pain  24. B12 deficiency  25. Abnormal thyroid blood test  26. Anemia due to stage 3 chronic kidney disease, unspecified whether stage 3a or 3b CKD (HCC)  -     CBC; Future  -     Iron and TIBC; Future  27. Encounter for screening mammogram for breast cancer  -     PIETRO GARO DIGITAL SCREEN BILATERAL; Future  28. Postmenopausal  -     DEXA BONE DENSITY 2 SITES; Future  29. Screening for osteoporosis  -     DEXA BONE DENSITY 2 SITES;  Future      Reviewed recent labs with patient  Obtain labs as ordered in 3 to 4 months  Follow-up with cardiology, Dr. Kim Santos as scheduled  Monitor blood pressure occasionally  Continued weight reduction recommended  Continue Singulair  Continue Serevent follow-up with pulmonary as scheduled  Continue gabapentin restless leg syndrome  Continue over-the-counter PPI and Tums as needed  Colonoscopy due in 2024  Continue Prozac and consider counseling  Continue gabapentin and tizanidine for low back pain  Follow-up with endocrinology as scheduled  Consider nephrology consult if kidney function worsens  Follow-up with Ortho  Continue B12 supplement  Obtain mammogram as previously ordered  Obtain DEXA scan  Tdap and shingles vaccines recommended  Call with concerns      No follow-ups on file. Subjective     HPI    Patient presents today for routine follow-up of her chronic medical problems including hypertension, hyperlipidemia, obesity, asthma, allergies, KRISHNA, central sleep apnea, restless leg syndrome and GERD. She also has a history of depression, anxiety and chronic low back pain from spondylolisthesis status post lumbar fusion. She also has a history of type 2 diabetes with chronic kidney disease and neuropathy. She has generalized osteoarthritis pain particularly in the left knee. She also has shoulder pain and right hip pain. She also has a history of B12 deficiency, abnormal thyroid labs and history of anemia suspected to be anemia of chronic disease. Overall she states she has been doing well. Her blood pressure is well controlled with losartan. She denies any side effects from her medication. She does continue on Lipitor for history of high cholesterol denies any side effects from her medication. She does have a history of obesity and has been trying to lose weight slowly over time. Her asthma and allergies are well controlled with Singulair. She does have obstructive sleep apnea and central sleep apnea that is treated with a Servovent  She does have a pulmonologist, Dr. Lb Rush that she sees regularly.   She did have a new sleep study done and did try a new mask but this did not work well so she went back to using her old mask. She does see Dr. Johana Anne in 2 weeks. She does follow with cardiology, Dr. Joe King and sees him next week. She does have a history of restless leg syndrome that is controlled with Neurontin. She does have GERD that is controlled with OTC Prilosec. She does occasionally take Tums. She did have an EGD and colonoscopy 2021 and does have a small 1 to 2 cm hiatal hernia that was noted on EGD. Her colonoscopy also showed some diverticulosis and a redundant colon. It was recommended that she have a repeat colonoscopy in 2024. She does have a history of irritable bowel syndrome and gets intermittent diarrhea from this. She started Metamucil previously and this has been helpful. She does have depression and anxiety that is controlled with Prozac daily and Ativan as needed. She has not used Ativan in a very long time. She does have a history of chronic low back pain and underwent a posterior lumbar interbody fusion L4/L5 due to spondylolisthesis in 2016. She does still get some intermittent low back pain that bothers her occasionally but nothing concerning. She does continue on Neurontin as needed tizanidine and this is helpful. She does have a history of type 2 diabetes with chronic kidney disease and neuropathy that is followed by endocrinology, Dr. Sarah Garcia. She did have an appointment but this was rescheduled. She will follow-up with her as scheduled. She does continue on insulin 70/30 at 10 units daily and she will drop this down to 7 units if her blood sugar is less than 100 and she will use 12 units if it is too high. She also continues on glimepiride and metformin. She continues to monitor blood sugars regularly. Her eye exam is up-to-date. She does get some occasional numbness and tingling in her feet but nothing too concerning.   She has seen nephrology in the past but has not seen them in some time. She does have generalized osteoarthritis that is severe in the left knee. She also gets shoulder pain and right hip pain. I did advise her that she should return to Dr. Letitia Veliz who she saw several years ago. She does have a history of B12 deficiency and takes an over-the-counter B12 supplement. She did have abnormal thyroid labs in the past.  She had a thyroid ultrasound that showed her gland was mildly heterogeneous diminutive. Endocrinology is monitoring this regularly. She does have a history of anemia secondary to anemia of chronic disease from her CKD. This has been stable over time. She has no other concerns. cmw        Review of Systems   Constitutional:  Negative for activity change, appetite change, fatigue, fever and unexpected weight change. HENT:  Negative for congestion, ear pain, postnasal drip, rhinorrhea, sinus pressure, sneezing, sore throat, tinnitus and trouble swallowing. Eyes:  Negative for pain, discharge, redness and visual disturbance. Respiratory:  Negative for cough, choking, chest tightness, shortness of breath, wheezing and stridor. Asthma well controlled with singulair daily and albuterol as needed. KRISHNA / central sleep apnea controlled with Servovent nightly - she follows up with Dr. Prem Givens. Cardiovascular:  Negative for chest pain, palpitations and leg swelling. Gastrointestinal:  Positive for diarrhea (secondary to irritable bowel - improved with fiber). Negative for abdominal pain, blood in stool, constipation, nausea and vomiting. GERD treated with omeprazole and tums occasionally   Endocrine: Negative for polydipsia, polyphagia and polyuria. History of abnormal thyroid labs    Genitourinary:  Negative for dysuria, pelvic pain and urgency. Musculoskeletal:  Positive for arthralgias (occasional bilateral shoulder pain / right hip pain / left knee pain) and back pain (Mid back pain occasionally).  Negative for myalgias and neck pain. Skin:  Positive for rash. Negative for color change and wound. Allergic/Immunologic: Negative for immunocompromised state. Neurological:  Positive for tremors (shaky at times), weakness and numbness. Negative for dizziness, light-headedness and headaches. Hematological:  Negative for adenopathy. Does not bruise/bleed easily. History of mild anemia   Psychiatric/Behavioral:  Positive for dysphoric mood (stable with prozac) and sleep disturbance (stable with trazodone). Negative for agitation, confusion and decreased concentration. The patient is nervous/anxious (stable). Objective     Physical Exam  Vitals and nursing note reviewed. Constitutional:       General: She is not in acute distress. Appearance: Normal appearance. She is well-developed. She is obese. She is not ill-appearing, toxic-appearing or diaphoretic. HENT:      Head: Normocephalic. Right Ear: Tympanic membrane, ear canal and external ear normal. There is no impacted cerumen. Left Ear: Tympanic membrane, ear canal and external ear normal. There is no impacted cerumen. Nose: Mucosal edema present. No congestion or rhinorrhea. Mouth/Throat:      Mouth: Mucous membranes are moist.      Pharynx: No oropharyngeal exudate. Eyes:      General: No scleral icterus. Right eye: No discharge. Left eye: No discharge. Extraocular Movements: Extraocular movements intact. Conjunctiva/sclera: Conjunctivae normal.      Pupils: Pupils are equal, round, and reactive to light. Neck:      Thyroid: No thyromegaly. Vascular: No JVD. Cardiovascular:      Rate and Rhythm: Normal rate and regular rhythm. Pulses: Normal pulses. Heart sounds: Murmur heard. Pulmonary:      Effort: Pulmonary effort is normal.      Breath sounds: Normal breath sounds. No stridor. No wheezing or rales.    Abdominal:      General: Bowel sounds are normal.      Palpations: Abdomen is soft. There is no mass. Tenderness: There is no abdominal tenderness. There is no right CVA tenderness or left CVA tenderness. Musculoskeletal:      Cervical back: Normal range of motion and neck supple. Lumbar back: Tenderness present. Decreased range of motion. Back:       Right hip: Tenderness present. Normal strength. Left hip: Tenderness present. Normal strength. Right knee: Normal.      Left knee: Decreased range of motion. Tenderness present over the medial joint line. Right lower leg: No edema. Left lower leg: No edema. Lymphadenopathy:      Cervical: No cervical adenopathy. Skin:     General: Skin is warm. Capillary Refill: Capillary refill takes less than 2 seconds. Findings: No rash (only a few excoriated macules over the arms and back - c/w neurodermatitis - much improved from before). Neurological:      General: No focal deficit present. Mental Status: She is alert and oriented to person, place, and time. Cranial Nerves: No cranial nerve deficit. Motor: No abnormal muscle tone. Coordination: Coordination normal.      Deep Tendon Reflexes: Reflexes normal.   Psychiatric:         Attention and Perception: Attention normal.         Mood and Affect: Mood normal. Mood is not anxious or depressed. Speech: Speech normal.         Behavior: Behavior normal.         Thought Content: Thought content normal.         Judgment: Judgment normal.          On this date 12/1/2022 I have spent 30 minutes reviewing previous notes, test results and face to face with the patient discussing the diagnosis and importance of compliance with the treatment plan as well as documenting on the day of the visit. An electronic signature was used to authenticate this note.     --Radha Baird MD

## 2023-01-17 ENCOUNTER — HOSPITAL ENCOUNTER (OUTPATIENT)
Dept: MAMMOGRAPHY | Age: 73
Discharge: HOME | End: 2023-01-19
Payer: MEDICARE

## 2023-01-17 DIAGNOSIS — Z78.0 POSTMENOPAUSAL: ICD-10-CM

## 2023-01-17 DIAGNOSIS — Z13.820 SCREENING FOR OSTEOPOROSIS: ICD-10-CM

## 2023-01-17 PROCEDURE — 77080 DXA BONE DENSITY AXIAL: CPT

## 2023-01-23 ENCOUNTER — HOSPITAL ENCOUNTER (OUTPATIENT)
Dept: MAMMOGRAPHY | Age: 73
Discharge: HOME | End: 2023-01-25
Payer: MEDICARE

## 2023-01-23 DIAGNOSIS — Z12.31 ENCOUNTER FOR SCREENING MAMMOGRAM FOR BREAST CANCER: ICD-10-CM

## 2023-01-23 PROCEDURE — 77067 SCR MAMMO BI INCL CAD: CPT

## 2023-03-07 ENCOUNTER — HOSPITAL ENCOUNTER (OUTPATIENT)
Age: 73
Setting detail: SPECIMEN
Discharge: HOME | End: 2023-03-07

## 2023-03-07 DIAGNOSIS — D63.1 ANEMIA DUE TO STAGE 3 CHRONIC KIDNEY DISEASE, UNSPECIFIED WHETHER STAGE 3A OR 3B CKD (HCC): ICD-10-CM

## 2023-03-07 DIAGNOSIS — I10 ESSENTIAL HYPERTENSION: ICD-10-CM

## 2023-03-07 DIAGNOSIS — E78.5 HYPERLIPIDEMIA WITH TARGET LDL LESS THAN 100: ICD-10-CM

## 2023-03-07 DIAGNOSIS — E11.8 DIABETES MELLITUS TYPE 2 WITH COMPLICATIONS (HCC): ICD-10-CM

## 2023-03-07 DIAGNOSIS — N18.30 ANEMIA DUE TO STAGE 3 CHRONIC KIDNEY DISEASE, UNSPECIFIED WHETHER STAGE 3A OR 3B CKD (HCC): ICD-10-CM

## 2023-03-07 LAB
ALBUMIN SERPL-MCNC: 4 G/DL (ref 3.5–5.2)
ALBUMIN/GLOBULIN RATIO: 1.5 (ref 1–2.5)
ALP SERPL-CCNC: 151 U/L (ref 35–104)
ALT SERPL-CCNC: 16 U/L (ref 5–33)
ANION GAP SERPL CALCULATED.3IONS-SCNC: 11 MMOL/L (ref 9–17)
AST SERPL-CCNC: 17 U/L
BILIRUB SERPL-MCNC: 0.3 MG/DL (ref 0.3–1.2)
BUN SERPL-MCNC: 29 MG/DL (ref 8–23)
CALCIUM SERPL-MCNC: 9.5 MG/DL (ref 8.6–10.4)
CHLORIDE SERPL-SCNC: 106 MMOL/L (ref 98–107)
CHOLEST SERPL-MCNC: 131 MG/DL
CHOLESTEROL/HDL RATIO: 3.2
CO2 SERPL-SCNC: 23 MMOL/L (ref 20–31)
CREAT SERPL-MCNC: 1.53 MG/DL (ref 0.5–0.9)
GFR SERPL CREATININE-BSD FRML MDRD: 35 ML/MIN/1.73M2
GLUCOSE SERPL-MCNC: 101 MG/DL (ref 70–99)
HCT VFR BLD AUTO: 33.1 % (ref 36.3–47.1)
HDLC SERPL-MCNC: 41 MG/DL
HGB BLD-MCNC: 10.7 G/DL (ref 11.9–15.1)
IRON SATURATION: 18 % (ref 20–55)
IRON SERPL-MCNC: 58 UG/DL (ref 37–145)
LDLC SERPL CALC-MCNC: 73 MG/DL (ref 0–130)
MCH RBC QN AUTO: 29.2 PG (ref 25.2–33.5)
MCHC RBC AUTO-ENTMCNC: 32.3 G/DL (ref 28.4–34.8)
MCV RBC AUTO: 90.4 FL (ref 82.6–102.9)
NRBC AUTOMATED: 0 PER 100 WBC
PDW BLD-RTO: 13.5 % (ref 11.8–14.4)
PLATELET # BLD AUTO: 221 K/UL (ref 138–453)
PMV BLD AUTO: 10.1 FL (ref 8.1–13.5)
POTASSIUM SERPL-SCNC: 5.2 MMOL/L (ref 3.7–5.3)
PROT SERPL-MCNC: 6.6 G/DL (ref 6.4–8.3)
RBC # BLD: 3.66 M/UL (ref 3.95–5.11)
SODIUM SERPL-SCNC: 140 MMOL/L (ref 135–144)
TIBC SERPL-MCNC: 317 UG/DL (ref 250–450)
TRIGL SERPL-MCNC: 85 MG/DL
UNSATURATED IRON BINDING CAPACITY: 259 UG/DL (ref 112–347)
WBC # BLD AUTO: 8.5 K/UL (ref 3.5–11.3)

## 2023-03-08 LAB
EST. AVERAGE GLUCOSE BLD GHB EST-MCNC: 146 MG/DL
HBA1C MFR BLD: 6.7 % (ref 4–6)

## 2023-03-10 DIAGNOSIS — E11.8 DIABETES MELLITUS TYPE 2 WITH COMPLICATIONS (HCC): ICD-10-CM

## 2023-03-10 DIAGNOSIS — N18.30 ANEMIA DUE TO STAGE 3 CHRONIC KIDNEY DISEASE, UNSPECIFIED WHETHER STAGE 3A OR 3B CKD (HCC): ICD-10-CM

## 2023-03-10 DIAGNOSIS — I10 ESSENTIAL HYPERTENSION: ICD-10-CM

## 2023-03-10 DIAGNOSIS — R79.89 ABNORMAL THYROID BLOOD TEST: ICD-10-CM

## 2023-03-10 DIAGNOSIS — E78.5 HYPERLIPIDEMIA WITH TARGET LDL LESS THAN 100: Primary | ICD-10-CM

## 2023-03-10 DIAGNOSIS — D63.1 ANEMIA DUE TO STAGE 3 CHRONIC KIDNEY DISEASE, UNSPECIFIED WHETHER STAGE 3A OR 3B CKD (HCC): ICD-10-CM

## 2023-03-16 ENCOUNTER — OFFICE VISIT (OUTPATIENT)
Dept: FAMILY MEDICINE CLINIC | Age: 73
End: 2023-03-16

## 2023-03-16 VITALS
SYSTOLIC BLOOD PRESSURE: 118 MMHG | HEART RATE: 64 BPM | DIASTOLIC BLOOD PRESSURE: 80 MMHG | WEIGHT: 217.2 LBS | BODY MASS INDEX: 39.09 KG/M2 | TEMPERATURE: 96.4 F

## 2023-03-16 DIAGNOSIS — F41.9 ANXIETY: ICD-10-CM

## 2023-03-16 DIAGNOSIS — K21.9 GASTROESOPHAGEAL REFLUX DISEASE WITHOUT ESOPHAGITIS: ICD-10-CM

## 2023-03-16 DIAGNOSIS — N18.30 ANEMIA DUE TO STAGE 3 CHRONIC KIDNEY DISEASE, UNSPECIFIED WHETHER STAGE 3A OR 3B CKD (HCC): ICD-10-CM

## 2023-03-16 DIAGNOSIS — M25.552 LEFT HIP PAIN: ICD-10-CM

## 2023-03-16 DIAGNOSIS — E11.8 DIABETES MELLITUS TYPE 2 WITH COMPLICATIONS (HCC): ICD-10-CM

## 2023-03-16 DIAGNOSIS — M43.10 SPONDYLOLISTHESIS, UNSPECIFIED SPINAL REGION: ICD-10-CM

## 2023-03-16 DIAGNOSIS — F32.1 MODERATE SINGLE CURRENT EPISODE OF MAJOR DEPRESSIVE DISORDER (HCC): ICD-10-CM

## 2023-03-16 DIAGNOSIS — G25.81 RESTLESS LEG SYNDROME: ICD-10-CM

## 2023-03-16 DIAGNOSIS — E11.42 DIABETIC POLYNEUROPATHY ASSOCIATED WITH TYPE 2 DIABETES MELLITUS (HCC): ICD-10-CM

## 2023-03-16 DIAGNOSIS — J30.1 CHRONIC SEASONAL ALLERGIC RHINITIS DUE TO POLLEN: ICD-10-CM

## 2023-03-16 DIAGNOSIS — M25.512 CHRONIC LEFT SHOULDER PAIN: ICD-10-CM

## 2023-03-16 DIAGNOSIS — E53.8 B12 DEFICIENCY: ICD-10-CM

## 2023-03-16 DIAGNOSIS — K44.9 HIATAL HERNIA: ICD-10-CM

## 2023-03-16 DIAGNOSIS — M25.551 RIGHT HIP PAIN: ICD-10-CM

## 2023-03-16 DIAGNOSIS — I10 ESSENTIAL HYPERTENSION: Primary | ICD-10-CM

## 2023-03-16 DIAGNOSIS — G47.33 OSA (OBSTRUCTIVE SLEEP APNEA): ICD-10-CM

## 2023-03-16 DIAGNOSIS — M15.9 GENERALIZED OSTEOARTHRITIS: ICD-10-CM

## 2023-03-16 DIAGNOSIS — E78.5 HYPERLIPIDEMIA WITH TARGET LDL LESS THAN 100: ICD-10-CM

## 2023-03-16 DIAGNOSIS — R79.89 ABNORMAL THYROID BLOOD TEST: ICD-10-CM

## 2023-03-16 DIAGNOSIS — N18.32 STAGE 3B CHRONIC KIDNEY DISEASE (HCC): ICD-10-CM

## 2023-03-16 DIAGNOSIS — G47.31 CENTRAL SLEEP APNEA: ICD-10-CM

## 2023-03-16 DIAGNOSIS — K58.0 IRRITABLE BOWEL SYNDROME WITH DIARRHEA: ICD-10-CM

## 2023-03-16 DIAGNOSIS — M85.89 OSTEOPENIA OF MULTIPLE SITES: ICD-10-CM

## 2023-03-16 DIAGNOSIS — G89.29 CHRONIC LEFT SHOULDER PAIN: ICD-10-CM

## 2023-03-16 DIAGNOSIS — M17.12 PRIMARY OSTEOARTHRITIS OF LEFT KNEE: ICD-10-CM

## 2023-03-16 DIAGNOSIS — E66.01 CLASS 2 SEVERE OBESITY DUE TO EXCESS CALORIES WITH SERIOUS COMORBIDITY AND BODY MASS INDEX (BMI) OF 38.0 TO 38.9 IN ADULT (HCC): ICD-10-CM

## 2023-03-16 DIAGNOSIS — Z98.1 S/P LUMBAR FUSION: ICD-10-CM

## 2023-03-16 DIAGNOSIS — D63.1 ANEMIA DUE TO STAGE 3 CHRONIC KIDNEY DISEASE, UNSPECIFIED WHETHER STAGE 3A OR 3B CKD (HCC): ICD-10-CM

## 2023-03-16 DIAGNOSIS — J45.20 MILD INTERMITTENT ASTHMA WITHOUT COMPLICATION: ICD-10-CM

## 2023-03-16 DIAGNOSIS — M54.50 CHRONIC BILATERAL LOW BACK PAIN WITHOUT SCIATICA: ICD-10-CM

## 2023-03-16 DIAGNOSIS — G89.29 CHRONIC BILATERAL LOW BACK PAIN WITHOUT SCIATICA: ICD-10-CM

## 2023-03-16 ASSESSMENT — PATIENT HEALTH QUESTIONNAIRE - PHQ9
9. THOUGHTS THAT YOU WOULD BE BETTER OFF DEAD, OR OF HURTING YOURSELF: 0
6. FEELING BAD ABOUT YOURSELF - OR THAT YOU ARE A FAILURE OR HAVE LET YOURSELF OR YOUR FAMILY DOWN: 0
SUM OF ALL RESPONSES TO PHQ QUESTIONS 1-9: 0
2. FEELING DOWN, DEPRESSED OR HOPELESS: 0
5. POOR APPETITE OR OVEREATING: 0
8. MOVING OR SPEAKING SO SLOWLY THAT OTHER PEOPLE COULD HAVE NOTICED. OR THE OPPOSITE, BEING SO FIGETY OR RESTLESS THAT YOU HAVE BEEN MOVING AROUND A LOT MORE THAN USUAL: 0
3. TROUBLE FALLING OR STAYING ASLEEP: 0
SUM OF ALL RESPONSES TO PHQ9 QUESTIONS 1 & 2: 0
1. LITTLE INTEREST OR PLEASURE IN DOING THINGS: 0
7. TROUBLE CONCENTRATING ON THINGS, SUCH AS READING THE NEWSPAPER OR WATCHING TELEVISION: 0
10. IF YOU CHECKED OFF ANY PROBLEMS, HOW DIFFICULT HAVE THESE PROBLEMS MADE IT FOR YOU TO DO YOUR WORK, TAKE CARE OF THINGS AT HOME, OR GET ALONG WITH OTHER PEOPLE: 0
SUM OF ALL RESPONSES TO PHQ QUESTIONS 1-9: 0
4. FEELING TIRED OR HAVING LITTLE ENERGY: 0

## 2023-03-16 ASSESSMENT — ENCOUNTER SYMPTOMS
COLOR CHANGE: 0
SINUS PRESSURE: 0
SHORTNESS OF BREATH: 0
TROUBLE SWALLOWING: 0
RHINORRHEA: 0
NAUSEA: 0
BACK PAIN: 1
EYE REDNESS: 0
STRIDOR: 0
WHEEZING: 0
CHOKING: 0
ABDOMINAL PAIN: 0
EYE PAIN: 0
BLOOD IN STOOL: 0
COUGH: 0
CONSTIPATION: 0
EYE DISCHARGE: 0
SORE THROAT: 0
VOMITING: 0
CHEST TIGHTNESS: 0
DIARRHEA: 1

## 2023-03-16 NOTE — PROGRESS NOTES
Deandre Ruiz (:  1950) is a 74 y.o. female,Established patient, here for evaluation of the following chief complaint(s):    Diabetes         ASSESSMENT/PLAN:    1. Essential hypertension  2. Hyperlipidemia with target LDL less than 100  3. Class 2 severe obesity due to excess calories with serious comorbidity and body mass index (BMI) of 38.0 to 38.9 in adult ([address]  4. Mild intermittent asthma without complication  5. Chronic seasonal allergic rhinitis due to pollen  6. KRISHNA (obstructive sleep apnea)  7. Central sleep apnea  8. Restless leg syndrome  9. Gastroesophageal reflux disease without esophagitis  10. Hiatal hernia  11. Irritable bowel syndrome with diarrhea  12. Moderate single current episode of major depressive disorder ([address]  13. Anxiety  14. Chronic bilateral low back pain without sciatica  15. Spondylolisthesis, unspecified spinal region  16. S/P lumbar fusion  17. Diabetes mellitus type 2 with complications ([facility]  18. Stage 3b chronic kidney disease ([facility]  -     [facility] - Dustin Michaud MD, Nephrology, [facility]. Diabetic polyneuropathy associated with type 2 diabetes mellitus ([address]  20. Generalized osteoarthritis  21. Primary osteoarthritis of left knee  22. Chronic left shoulder pain  23. Right hip pain  24. Left hip pain  25. B12 deficiency  26. Abnormal thyroid blood test  27. Anemia due to stage 3 chronic kidney disease, unspecified whether stage 3a or 3b CKD ([address]  -     [facility] - Dustin Michaud MD, Nephrology, [place]  28. Osteopenia of multiple sites  -     Vitamin D 25 Hydroxy;  Future      Reviewed recent labs with patient  Obtain labs as ordered in 3 to 4 months  Follow-up with cardiology, Dr. Laura Chavarria regularly  Monitor blood pressure occasionally  Continue statin  Continue with weight reduction  Continue Singulair  Continue Servovent and follow-up with pulmonary as scheduled  Continue gabapentin for restless leg syndrome  Continue over-the-counter PPI and Tums as needed  Colonoscopy recommended in 2024  Continue Prozac  Consider counseling if symptoms worsen  Continue gabapentin and tizanidine for low back pain  Follow-up with endocrinology as scheduled  Discussed possible GLP-1 medication and discontinuing glimepiride due to hypoglycemia  Recommend nephrology consult  Follow-up with Ortho  Consider physical therapy  Continue B12 supplement  Monitor thyroid functions intermittently  Monitor blood counts regularly  Calcium and Vitamin D3 supplement regularly with weightbearing exercise  Obtain DEXA scan in 2025  Yearly mammogram recommended  Tdap vaccine recommended  Call with concerns        Return in about 3 months (around 6/16/2023) for routine follow up. Jacques RICHARDS    Presents today for routine follow-up of her chronic medical problems including hypertension, hyperlipidemia, obesity, asthma, allergies, KRISHNA, central sleep apnea, restless leg syndrome and GERD. She also has a history of depression, anxiety and chronic low back pain from spondylolisthesis status post lumbar fusion. She also has a history of type 2 diabetes with chronic kidney disease and neuropathy. She also has generalized osteoarthritis particularly of the left knee. She also has left shoulder pain and bilateral hip pain. She also has B12 deficiency, abnormal thyroid labs and history of anemia suspected to be anemia of chronic disease. She also has osteopenia. Overall she has been doing well. Her blood pressure has been well controlled with losartan. She denies any side effects from her medication. She also follows with cardiology, Dr. Navneet Medel regularly. She does continue on Lipitor for her history of hyperlipidemia and denies any side effects from her medication. She does have a history of obesity and does continue to try and lose weight over time. Her weight has been stable. Her asthma and allergies are well controlled with Singulair.   She does have a history of obstructive sleep apnea and central sleep apnea that is treated with a servo vent. She does see pulmonology, Dr. Manasa Palmer regularly. She did have a new sleep study done at the end of 2022. She did try a new mask but it did not work well so she went back to using her old mask. She does see Dr. Manasa Palmer in several weeks. She does have a history of restless leg syndrome that is controlled with Neurontin. She does have GERD that is controlled with over-the-counter Prilosec daily and occasional Tums. This has been a little flared up since starting calcium for her osteopenia. She did have an EGD and colonoscopy in 2021. She does have a small 1 to 2 cm hiatal hernia that was noted on EGD. Her colonoscopy did show some diverticulosis and a redundant colon. It was recommended that she have a repeat colonoscopy in 2024. She does not want to proceed with this. We will discuss this again in 2024. She does have a history of irritable bowel syndrome and gets intermittent diarrhea. She does take Metamucil and this has been helpful. She does have depression and anxiety that is controlled with Prozac daily and Ativan as needed. She actually has not used Ativan in a very long time. She does have chronic low back pain secondary to underlying spondylolisthesis. She underwent a posterior lumbar interbody fusion at L4/L5 in 2016. She does still have intermittent low back pain that bothers her regularly but this is nothing that she is concerned with. She does take her Neurontin and as needed tizanidine and this is helpful. She does have a history of type 2 diabetes with chronic kidney disease and neuropathy that is followed by endocrinology, Dr. Desiree Tan. She does have an upcoming appointment in April. She does continue on insulin 70/30 at 10 units daily and she will drop this down to 7 units if her blood sugar is less than 100 and she will use 12 units if it is too high. She does continue on glimepiride and metformin. She continues to monitor her blood sugars regularly and has occasional blood sugars that are low in the 60s. Her eye exam is up-to-date. Her chronic kidney disease has been fairly stable over time with however her creatinine did bump up to 1.53 this past time. She did see Dr. Montez Lopez years ago for chronic kidney disease but has not seen him in some time. She would like to consult with a different nephrologist if she is able for a second opinion. She does still complain of some occasional numbness and tingling in her feet, especially her right but nothing that is too concerning to her. She does have generalized osteoarthritis that is severe in the left knee. She also gets some left shoulder pain and bilateral hip pain. I have advised her to return to Ortho however she has elected not to do this just yet. Her symptoms are not severe enough and she will continue to monitor. She does have a history of B12 deficiency and takes an over-the-counter B12 supplement. She has had abnormal thyroid labs in the past.  She did have a thyroid ultrasound that showed that her thyroid gland was mildly heterogeneously diminutive. Endocrinology monitors this regularly. She does have a history of anemia likely secondary to anemia of chronic disease from her CKD. This has been stable over time. She did have a DEXA scan done that showed osteopenia. It was recommended that she take calcium and vitamin D supplement along with weightbearing exercise. Her mammogram is up-to-date. She has no other concerns at this time  cmw        Review of Systems   Constitutional:  Negative for activity change, appetite change, fatigue, fever and unexpected weight change. HENT:  Negative for congestion, ear pain, postnasal drip, rhinorrhea, sinus pressure, sneezing, sore throat, tinnitus and trouble swallowing. Eyes:  Negative for pain, discharge, redness and visual disturbance.    Respiratory:  Negative for cough, choking, chest tightness, shortness of breath, wheezing and stridor. Asthma well controlled with singulair daily and albuterol as needed. KRISHNA / central sleep apnea controlled with Servovent nightly - she follows up with Dr. Tammy Lezama. Cardiovascular:  Negative for chest pain, palpitations and leg swelling. Gastrointestinal:  Positive for diarrhea (secondary to irritable bowel - improved with fiber). Negative for abdominal pain, blood in stool, constipation, nausea and vomiting. GERD treated with omeprazole and tums occasionally   Endocrine: Negative for polydipsia, polyphagia and polyuria. History of abnormal thyroid labs    Genitourinary:  Negative for dysuria, pelvic pain and urgency. Musculoskeletal:  Positive for arthralgias (left shoulder pain / right hip pain / left hip pain / left knee pain) and back pain (low and mid back pain). Negative for myalgias and neck pain. Skin:  Negative for color change, rash and wound. Allergic/Immunologic: Negative for immunocompromised state. Neurological:  Positive for tremors (shaky at times) and numbness. Negative for dizziness, weakness, light-headedness and headaches. Hematological:  Negative for adenopathy. Does not bruise/bleed easily. History of mild anemia   Psychiatric/Behavioral:  Positive for dysphoric mood (stable with prozac) and sleep disturbance (stable with trazodone). Negative for agitation, confusion and decreased concentration. The patient is nervous/anxious (stable). Objective     Physical Exam  Vitals and nursing note reviewed. Constitutional:       General: She is not in acute distress. Appearance: Normal appearance. She is well-developed. She is obese. She is not ill-appearing, toxic-appearing or diaphoretic. HENT:      Head: Normocephalic. Right Ear: Tympanic membrane, ear canal and external ear normal. There is no impacted cerumen.       Left Ear: Tympanic membrane, ear canal and external ear normal. There is no impacted cerumen. Nose: Mucosal edema present. No congestion or rhinorrhea. Mouth/Throat:      Mouth: Mucous membranes are moist.      Pharynx: No oropharyngeal exudate. Eyes:      General: No scleral icterus. Right eye: No discharge. Left eye: No discharge. Extraocular Movements: Extraocular movements intact. Conjunctiva/sclera: Conjunctivae normal.      Pupils: Pupils are equal, round, and reactive to light. Neck:      Thyroid: No thyromegaly. Vascular: No JVD. Cardiovascular:      Rate and Rhythm: Normal rate and regular rhythm. Pulses: Normal pulses. Heart sounds: Murmur heard. Pulmonary:      Effort: Pulmonary effort is normal.      Breath sounds: Normal breath sounds. No stridor. No wheezing or rales. Abdominal:      General: Bowel sounds are normal.      Palpations: Abdomen is soft. There is no mass. Tenderness: There is no abdominal tenderness. There is no right CVA tenderness or left CVA tenderness. Musculoskeletal:      Left shoulder: Tenderness present. Decreased range of motion. Cervical back: Normal range of motion and neck supple. Thoracic back: Spasms and tenderness present. Lumbar back: Tenderness present. Decreased range of motion. Back:       Right hip: Tenderness present. Normal strength. Left hip: Tenderness present. Normal strength. Right knee: Normal.      Left knee: Decreased range of motion. Tenderness present over the medial joint line. Right lower leg: No edema. Left lower leg: No edema. Lymphadenopathy:      Cervical: No cervical adenopathy. Skin:     General: Skin is warm. Capillary Refill: Capillary refill takes less than 2 seconds. Neurological:      General: No focal deficit present. Mental Status: She is alert and oriented to person, place, and time. Cranial Nerves: No cranial nerve deficit. Motor: No abnormal muscle tone. Coordination: Coordination normal.      Deep Tendon Reflexes: Reflexes normal.   Psychiatric:         Attention and Perception: Attention normal.         Mood and Affect: Mood normal. Mood is not anxious or depressed. Speech: Speech normal.         Behavior: Behavior normal.         Thought Content: Thought content normal.         Judgment: Judgment normal.          On this date 3/16/2023 I have spent 40 minutes reviewing previous notes, test results and face to face with the patient discussing the diagnosis and importance of compliance with the treatment plan as well as documenting on the day of the visit. An electronic signature was used to authenticate this note.     --Karmen Graves MD

## 2023-03-29 DIAGNOSIS — B37.2 YEAST DERMATITIS: ICD-10-CM

## 2023-03-29 DIAGNOSIS — F32.0 CURRENT MILD EPISODE OF MAJOR DEPRESSIVE DISORDER WITHOUT PRIOR EPISODE (HCC): ICD-10-CM

## 2023-03-29 DIAGNOSIS — I10 HYPERTENSION, UNSPECIFIED TYPE: ICD-10-CM

## 2023-03-29 DIAGNOSIS — M43.10 SPONDYLOLISTHESIS, UNSPECIFIED SPINAL REGION: ICD-10-CM

## 2023-03-29 RX ORDER — TIZANIDINE 2 MG/1
TABLET ORAL
Qty: 270 TABLET | Refills: 1 | Status: SHIPPED | OUTPATIENT
Start: 2023-03-29

## 2023-03-29 RX ORDER — NYSTATIN 100000 U/G
OINTMENT TOPICAL
Qty: 90 G | Refills: 1 | Status: SHIPPED | OUTPATIENT
Start: 2023-03-29

## 2023-03-29 RX ORDER — LOSARTAN POTASSIUM 100 MG/1
TABLET ORAL
Qty: 90 TABLET | Refills: 1 | Status: SHIPPED | OUTPATIENT
Start: 2023-03-29

## 2023-03-29 RX ORDER — FLUOXETINE HYDROCHLORIDE 40 MG/1
CAPSULE ORAL
Qty: 90 CAPSULE | Refills: 1 | Status: SHIPPED | OUTPATIENT
Start: 2023-03-29

## 2023-03-29 NOTE — TELEPHONE ENCOUNTER
Problem List:     Restless leg syndrome     KRISHNA on CPAP     Diabetes mellitus type 2 with complications (HCC)     Hyperlipidemia with target LDL less than 100     Asthma     Allergic rhinitis     Essential hypertension     Major depression     Anxiety     Spondylisthesis     S/P lumbar fusion     Gastroesophageal reflux disease     Diabetic nephropathy associated with type 2 diabetes mellitus (HCC)     Moderate single current episode of major depressive disorder ([facility] [address]     Irritable bowel syndrome with diarrhea     Morbid obesity with BMI of 40.0-44.9, adult (HCC)     Chronic kidney disease, stage III (moderate) (HCC)     Diabetic polyneuropathy associated with type 2 diabetes mellitus (HCC)     B12 deficiency     Central sleep apnea     GERD (gastroesophageal reflux disease)     Dysphagia     Anemia due to stage 3 chronic kidney disease (HCC)     Abdominal pain, epigastric     Moderate obesity     Severe obesity (BMI 35.0-35.9 with comorbidity) (HCC)     Absolute anemia

## 2023-07-20 DIAGNOSIS — G25.81 RESTLESS LEG SYNDROME: ICD-10-CM

## 2023-07-20 RX ORDER — GABAPENTIN 300 MG/1
300 CAPSULE ORAL 3 TIMES DAILY
Qty: 270 CAPSULE | Refills: 1 | Status: SHIPPED | OUTPATIENT
Start: 2023-07-20 | End: 2024-07-19

## 2023-07-20 NOTE — TELEPHONE ENCOUNTER
LOV  3/16/23  RTO 9/21/23  LRF 11/23/22          Controlled Substance Monitoring:    Acute and Chronic Pain Monitoring:   RX Monitoring 2/28/2022   Attestation -   Periodic Controlled Substance Monitoring No signs of potential drug abuse or diversion identified.

## 2023-08-16 DIAGNOSIS — E78.5 HYPERLIPIDEMIA, UNSPECIFIED HYPERLIPIDEMIA TYPE: ICD-10-CM

## 2023-08-16 DIAGNOSIS — F32.9 MAJOR DEPRESSIVE DISORDER WITH CURRENT ACTIVE EPISODE, UNSPECIFIED DEPRESSION EPISODE SEVERITY, UNSPECIFIED WHETHER RECURRENT: ICD-10-CM

## 2023-08-16 NOTE — TELEPHONE ENCOUNTER
LOV: 3/16/2023    RTO:   Future Appointments   Date Time Provider [address]   9/21/2023  3:00 PM MD Ángel Wu   9/21/2023  3:30 PM MD Ángel Wu [facility] Mayelin Pemberton   11/29/2023 12:30 PM MD Ángel Wu   3/27/2024  2:00 PM MD Ángel Wu  Mayelin Pemberton     LRF: 11/23/22          Controlled Substance Monitoring:    Acute and Chronic Pain Monitoring:   RX Monitoring 2/28/2022   Attestation -   Periodic Controlled Substance Monitoring No signs of potential drug abuse or diversion identified.

## 2023-08-17 RX ORDER — TRAZODONE HYDROCHLORIDE 100 MG/1
TABLET ORAL
Qty: 180 TABLET | Refills: 1 | Status: SHIPPED | OUTPATIENT
Start: 2023-08-17

## 2023-08-17 RX ORDER — ATORVASTATIN CALCIUM 80 MG/1
80 TABLET, FILM COATED ORAL DAILY
Qty: 90 TABLET | Refills: 1 | Status: SHIPPED | OUTPATIENT
Start: 2023-08-17

## 2023-09-18 ENCOUNTER — HOSPITAL ENCOUNTER (OUTPATIENT)
Age: 73
Setting detail: SPECIMEN
Discharge: HOME | End: 2023-09-18

## 2023-09-18 DIAGNOSIS — D63.1 ANEMIA DUE TO STAGE 3 CHRONIC KIDNEY DISEASE, UNSPECIFIED WHETHER STAGE 3A OR 3B CKD (HCC): ICD-10-CM

## 2023-09-18 DIAGNOSIS — N18.30 ANEMIA DUE TO STAGE 3 CHRONIC KIDNEY DISEASE, UNSPECIFIED WHETHER STAGE 3A OR 3B CKD (HCC): ICD-10-CM

## 2023-09-18 DIAGNOSIS — E78.5 HYPERLIPIDEMIA WITH TARGET LDL LESS THAN 100: ICD-10-CM

## 2023-09-18 DIAGNOSIS — I10 ESSENTIAL HYPERTENSION: ICD-10-CM

## 2023-09-18 DIAGNOSIS — R79.89 ABNORMAL THYROID BLOOD TEST: ICD-10-CM

## 2023-09-18 DIAGNOSIS — E11.8 DIABETES MELLITUS TYPE 2 WITH COMPLICATIONS (HCC): ICD-10-CM

## 2023-09-18 DIAGNOSIS — M85.89 OSTEOPENIA OF MULTIPLE SITES: ICD-10-CM

## 2023-09-18 LAB
25(OH)D3 SERPL-MCNC: 30.8 NG/ML
ALBUMIN SERPL-MCNC: 3.7 G/DL (ref 3.5–5.2)
ALBUMIN/GLOB SERPL: 1.4 {RATIO} (ref 1–2.5)
ALP SERPL-CCNC: 114 U/L (ref 35–104)
ALT SERPL-CCNC: 16 U/L (ref 5–33)
ANION GAP SERPL CALCULATED.3IONS-SCNC: 12 MMOL/L (ref 9–17)
AST SERPL-CCNC: 17 U/L
BILIRUB SERPL-MCNC: 0.2 MG/DL (ref 0.3–1.2)
BUN SERPL-MCNC: 27 MG/DL (ref 8–23)
CALCIUM SERPL-MCNC: 8.7 MG/DL (ref 8.6–10.4)
CHLORIDE SERPL-SCNC: 105 MMOL/L (ref 98–107)
CO2 SERPL-SCNC: 24 MMOL/L (ref 20–31)
CREAT SERPL-MCNC: 1.5 MG/DL (ref 0.5–0.9)
ERYTHROCYTE [DISTWIDTH] IN BLOOD BY AUTOMATED COUNT: 13.9 % (ref 11.8–14.4)
FERRITIN SERPL-MCNC: 23 NG/ML (ref 13–150)
GFR SERPL CREATININE-BSD FRML MDRD: 36 ML/MIN/1.73M2
GLUCOSE SERPL-MCNC: 88 MG/DL (ref 70–99)
HCT VFR BLD AUTO: 32.4 % (ref 36.3–47.1)
HGB BLD-MCNC: 10.1 G/DL (ref 11.9–15.1)
IRON SATN MFR SERPL: 18 % (ref 20–55)
IRON SERPL-MCNC: 57 UG/DL (ref 37–145)
MCH RBC QN AUTO: 29.3 PG (ref 25.2–33.5)
MCHC RBC AUTO-ENTMCNC: 31.2 G/DL (ref 28.4–34.8)
MCV RBC AUTO: 93.9 FL (ref 82.6–102.9)
NRBC BLD-RTO: 0 PER 100 WBC
PLATELET # BLD AUTO: 235 K/UL (ref 138–453)
PMV BLD AUTO: 9.2 FL (ref 8.1–13.5)
POTASSIUM SERPL-SCNC: 4.7 MMOL/L (ref 3.7–5.3)
PROT SERPL-MCNC: 6.4 G/DL (ref 6.4–8.3)
RBC # BLD AUTO: 3.45 M/UL (ref 3.95–5.11)
SODIUM SERPL-SCNC: 141 MMOL/L (ref 135–144)
TIBC SERPL-MCNC: 324 UG/DL (ref 250–450)
TSH SERPL DL<=0.05 MIU/L-ACNC: 3.6 UIU/ML (ref 0.3–5)
UNSATURATED IRON BINDING CAPACITY: 267 UG/DL (ref 112–347)
WBC OTHER # BLD: 7.9 K/UL (ref 3.5–11.3)

## 2023-09-19 LAB
EST. AVERAGE GLUCOSE BLD GHB EST-MCNC: 137 MG/DL
HBA1C MFR BLD: 6.4 % (ref 4–6)

## 2023-09-21 ENCOUNTER — OFFICE VISIT (OUTPATIENT)
Dept: FAMILY MEDICINE CLINIC | Age: 73
End: 2023-09-21

## 2023-09-21 ENCOUNTER — OFFICE VISIT (OUTPATIENT)
Dept: FAMILY MEDICINE CLINIC | Age: 73
End: 2023-09-21
Payer: MEDICARE

## 2023-09-21 VITALS
RESPIRATION RATE: 16 BRPM | SYSTOLIC BLOOD PRESSURE: 112 MMHG | HEART RATE: 70 BPM | WEIGHT: 215.4 LBS | DIASTOLIC BLOOD PRESSURE: 72 MMHG | TEMPERATURE: 97.3 F | BODY MASS INDEX: 38.77 KG/M2

## 2023-09-21 VITALS — TEMPERATURE: 97.3 F | DIASTOLIC BLOOD PRESSURE: 72 MMHG | HEART RATE: 70 BPM | SYSTOLIC BLOOD PRESSURE: 112 MMHG

## 2023-09-21 DIAGNOSIS — E66.01 CLASS 2 SEVERE OBESITY DUE TO EXCESS CALORIES WITH SERIOUS COMORBIDITY AND BODY MASS INDEX (BMI) OF 38.0 TO 38.9 IN ADULT (HCC): ICD-10-CM

## 2023-09-21 DIAGNOSIS — Z00.00 MEDICARE ANNUAL WELLNESS VISIT, SUBSEQUENT: Primary | ICD-10-CM

## 2023-09-21 DIAGNOSIS — J30.1 CHRONIC SEASONAL ALLERGIC RHINITIS DUE TO POLLEN: ICD-10-CM

## 2023-09-21 DIAGNOSIS — M17.12 PRIMARY OSTEOARTHRITIS OF LEFT KNEE: ICD-10-CM

## 2023-09-21 DIAGNOSIS — G47.33 OSA (OBSTRUCTIVE SLEEP APNEA): ICD-10-CM

## 2023-09-21 DIAGNOSIS — R79.89 ABNORMAL THYROID BLOOD TEST: ICD-10-CM

## 2023-09-21 DIAGNOSIS — G89.29 CHRONIC BILATERAL LOW BACK PAIN WITHOUT SCIATICA: ICD-10-CM

## 2023-09-21 DIAGNOSIS — E53.8 B12 DEFICIENCY: ICD-10-CM

## 2023-09-21 DIAGNOSIS — R25.1 TREMOR: ICD-10-CM

## 2023-09-21 DIAGNOSIS — F32.1 MODERATE SINGLE CURRENT EPISODE OF MAJOR DEPRESSIVE DISORDER (HCC): ICD-10-CM

## 2023-09-21 DIAGNOSIS — D63.1 ANEMIA DUE TO STAGE 3 CHRONIC KIDNEY DISEASE, UNSPECIFIED WHETHER STAGE 3A OR 3B CKD (HCC): ICD-10-CM

## 2023-09-21 DIAGNOSIS — E11.42 DIABETIC POLYNEUROPATHY ASSOCIATED WITH TYPE 2 DIABETES MELLITUS (HCC): ICD-10-CM

## 2023-09-21 DIAGNOSIS — K58.0 IRRITABLE BOWEL SYNDROME WITH DIARRHEA: ICD-10-CM

## 2023-09-21 DIAGNOSIS — M54.50 CHRONIC BILATERAL LOW BACK PAIN WITHOUT SCIATICA: ICD-10-CM

## 2023-09-21 DIAGNOSIS — M25.551 RIGHT HIP PAIN: ICD-10-CM

## 2023-09-21 DIAGNOSIS — K21.9 GASTROESOPHAGEAL REFLUX DISEASE WITHOUT ESOPHAGITIS: ICD-10-CM

## 2023-09-21 DIAGNOSIS — J45.20 MILD INTERMITTENT ASTHMA WITHOUT COMPLICATION: ICD-10-CM

## 2023-09-21 DIAGNOSIS — I10 HYPERTENSION, UNSPECIFIED TYPE: Primary | ICD-10-CM

## 2023-09-21 DIAGNOSIS — Z98.1 S/P LUMBAR FUSION: ICD-10-CM

## 2023-09-21 DIAGNOSIS — M25.512 CHRONIC LEFT SHOULDER PAIN: ICD-10-CM

## 2023-09-21 DIAGNOSIS — M25.552 LEFT HIP PAIN: ICD-10-CM

## 2023-09-21 DIAGNOSIS — N18.32 STAGE 3B CHRONIC KIDNEY DISEASE (HCC): ICD-10-CM

## 2023-09-21 DIAGNOSIS — M85.89 OSTEOPENIA OF MULTIPLE SITES: ICD-10-CM

## 2023-09-21 DIAGNOSIS — G89.29 CHRONIC LEFT SHOULDER PAIN: ICD-10-CM

## 2023-09-21 DIAGNOSIS — G47.31 CENTRAL SLEEP APNEA: ICD-10-CM

## 2023-09-21 DIAGNOSIS — M43.10 SPONDYLOLISTHESIS, UNSPECIFIED SPINAL REGION: ICD-10-CM

## 2023-09-21 DIAGNOSIS — N18.30 ANEMIA DUE TO STAGE 3 CHRONIC KIDNEY DISEASE, UNSPECIFIED WHETHER STAGE 3A OR 3B CKD (HCC): ICD-10-CM

## 2023-09-21 DIAGNOSIS — K44.9 HIATAL HERNIA: ICD-10-CM

## 2023-09-21 DIAGNOSIS — F41.9 ANXIETY: ICD-10-CM

## 2023-09-21 DIAGNOSIS — M15.9 GENERALIZED OSTEOARTHRITIS: ICD-10-CM

## 2023-09-21 DIAGNOSIS — G25.81 RESTLESS LEG SYNDROME: ICD-10-CM

## 2023-09-21 DIAGNOSIS — E78.5 HYPERLIPIDEMIA, UNSPECIFIED HYPERLIPIDEMIA TYPE: ICD-10-CM

## 2023-09-21 DIAGNOSIS — E11.8 DIABETES MELLITUS TYPE 2 WITH COMPLICATIONS (HCC): ICD-10-CM

## 2023-09-21 PROCEDURE — 3078F DIAST BP <80 MM HG: CPT | Performed by: PEDIATRICS

## 2023-09-21 PROCEDURE — G0439 PPPS, SUBSEQ VISIT: HCPCS | Performed by: PEDIATRICS

## 2023-09-21 PROCEDURE — 1123F ACP DISCUSS/DSCN MKR DOCD: CPT | Performed by: PEDIATRICS

## 2023-09-21 PROCEDURE — 3017F COLORECTAL CA SCREEN DOC REV: CPT | Performed by: PEDIATRICS

## 2023-09-21 PROCEDURE — 3074F SYST BP LT 130 MM HG: CPT | Performed by: PEDIATRICS

## 2023-09-21 RX ORDER — METFORMIN HYDROCHLORIDE 500 MG/1
1 TABLET, EXTENDED RELEASE ORAL 2 TIMES DAILY
Qty: 60 TABLET | Refills: 12 | COMMUNITY
Start: 2023-09-14 | End: 2024-09-21

## 2023-09-21 SDOH — ECONOMIC STABILITY: INCOME INSECURITY: HOW HARD IS IT FOR YOU TO PAY FOR THE VERY BASICS LIKE FOOD, HOUSING, MEDICAL CARE, AND HEATING?: NOT HARD AT ALL

## 2023-09-21 SDOH — ECONOMIC STABILITY: HOUSING INSECURITY
IN THE LAST 12 MONTHS, WAS THERE A TIME WHEN YOU DID NOT HAVE A STEADY PLACE TO SLEEP OR SLEPT IN A SHELTER (INCLUDING NOW)?: NO

## 2023-09-21 SDOH — ECONOMIC STABILITY: FOOD INSECURITY: WITHIN THE PAST 12 MONTHS, YOU WORRIED THAT YOUR FOOD WOULD RUN OUT BEFORE YOU GOT MONEY TO BUY MORE.: NEVER TRUE

## 2023-09-21 SDOH — ECONOMIC STABILITY: FOOD INSECURITY: WITHIN THE PAST 12 MONTHS, THE FOOD YOU BOUGHT JUST DIDN'T LAST AND YOU DIDN'T HAVE MONEY TO GET MORE.: NEVER TRUE

## 2023-09-21 ASSESSMENT — ENCOUNTER SYMPTOMS
BACK PAIN: 1
BLOOD IN STOOL: 0
EYE REDNESS: 0
NAUSEA: 0
TROUBLE SWALLOWING: 0
EYE DISCHARGE: 0
CHOKING: 0
SHORTNESS OF BREATH: 0
DIARRHEA: 1
SORE THROAT: 0
COUGH: 0
ABDOMINAL PAIN: 0
VOMITING: 0
COLOR CHANGE: 0
SINUS PRESSURE: 0
EYE PAIN: 0
RHINORRHEA: 0
WHEEZING: 0
STRIDOR: 0
CHEST TIGHTNESS: 0
CONSTIPATION: 0

## 2023-09-21 ASSESSMENT — PATIENT HEALTH QUESTIONNAIRE - PHQ9
SUM OF ALL RESPONSES TO PHQ QUESTIONS 1-9: 2
SUM OF ALL RESPONSES TO PHQ9 QUESTIONS 1 & 2: 0
1. LITTLE INTEREST OR PLEASURE IN DOING THINGS: 0
SUM OF ALL RESPONSES TO PHQ QUESTIONS 1-9: 2
5. POOR APPETITE OR OVEREATING: 2
8. MOVING OR SPEAKING SO SLOWLY THAT OTHER PEOPLE COULD HAVE NOTICED. OR THE OPPOSITE, BEING SO FIGETY OR RESTLESS THAT YOU HAVE BEEN MOVING AROUND A LOT MORE THAN USUAL: 0
10. IF YOU CHECKED OFF ANY PROBLEMS, HOW DIFFICULT HAVE THESE PROBLEMS MADE IT FOR YOU TO DO YOUR WORK, TAKE CARE OF THINGS AT HOME, OR GET ALONG WITH OTHER PEOPLE: 0
9. THOUGHTS THAT YOU WOULD BE BETTER OFF DEAD, OR OF HURTING YOURSELF: 0
2. FEELING DOWN, DEPRESSED OR HOPELESS: 0
SUM OF ALL RESPONSES TO PHQ QUESTIONS 1-9: 2
3. TROUBLE FALLING OR STAYING ASLEEP: 0
SUM OF ALL RESPONSES TO PHQ QUESTIONS 1-9: 2
4. FEELING TIRED OR HAVING LITTLE ENERGY: 0
6. FEELING BAD ABOUT YOURSELF - OR THAT YOU ARE A FAILURE OR HAVE LET YOURSELF OR YOUR FAMILY DOWN: 0
7. TROUBLE CONCENTRATING ON THINGS, SUCH AS READING THE NEWSPAPER OR WATCHING TELEVISION: 0

## 2023-09-21 NOTE — PROGRESS NOTES
redness and visual disturbance. Respiratory:  Negative for cough, choking, chest tightness, shortness of breath, wheezing and stridor. Asthma well controlled with singulair daily and albuterol as needed. KRISHNA / central sleep apnea controlled with Servovent nightly - she follows up with Dr. Minerva Montes De Oca. Cardiovascular:  Negative for chest pain, palpitations and leg swelling. Gastrointestinal:  Positive for diarrhea (secondary to irritable bowel - improved with fiber). Negative for abdominal pain, blood in stool, constipation, nausea and vomiting. GERD treated with omeprazole and tums occasionally   Endocrine: Negative for polydipsia, polyphagia and polyuria. History of abnormal thyroid labs    Genitourinary:  Negative for dysuria, frequency, hematuria, pelvic pain and urgency. Musculoskeletal:  Positive for arthralgias (left shoulder pain / right hip pain / left hip pain / left knee pain) and back pain (low and mid back pain). Negative for myalgias and neck pain. Skin:  Negative for color change, rash and wound. Allergic/Immunologic: Negative for immunocompromised state. Neurological:  Positive for tremors (shaky at times) and numbness. Negative for dizziness, weakness, light-headedness and headaches. Hematological:  Negative for adenopathy. Does not bruise/bleed easily. History of mild anemia   Psychiatric/Behavioral:  Positive for dysphoric mood (stable with prozac) and sleep disturbance (stable with trazodone). Negative for agitation, confusion and decreased concentration. The patient is nervous/anxious (stable). Objective     Physical Exam  Vitals and nursing note reviewed. Constitutional:       General: She is not in acute distress. Appearance: Normal appearance. She is well-developed. She is obese. She is not ill-appearing, toxic-appearing or diaphoretic. HENT:      Head: Normocephalic.       Right Ear: Tympanic membrane, ear canal and external ear

## 2023-11-26 DIAGNOSIS — F32.0 CURRENT MILD EPISODE OF MAJOR DEPRESSIVE DISORDER WITHOUT PRIOR EPISODE (HCC): ICD-10-CM

## 2023-11-27 NOTE — TELEPHONE ENCOUNTER
LOV 9-21-23   RTO 11-29-23  LRF 3-29-23          Controlled Substance Monitoring:    Acute and Chronic Pain Monitoring:   RX Monitoring Periodic Controlled Substance Monitoring   2/28/2022  11:32 PM No signs of potential drug abuse or diversion identified.

## 2023-11-28 RX ORDER — FLUOXETINE HYDROCHLORIDE 40 MG/1
40 CAPSULE ORAL DAILY
Qty: 90 CAPSULE | Refills: 1 | Status: SHIPPED | OUTPATIENT
Start: 2023-11-28 | End: 2024-11-28

## 2024-01-15 ENCOUNTER — OFFICE VISIT (OUTPATIENT)
Dept: FAMILY MEDICINE CLINIC | Age: 74
End: 2024-01-15

## 2024-01-15 ENCOUNTER — HOSPITAL ENCOUNTER (OUTPATIENT)
Age: 74
Setting detail: SPECIMEN
Discharge: HOME | End: 2024-01-15

## 2024-01-15 VITALS
DIASTOLIC BLOOD PRESSURE: 84 MMHG | HEART RATE: 71 BPM | TEMPERATURE: 96.6 F | WEIGHT: 217 LBS | SYSTOLIC BLOOD PRESSURE: 124 MMHG | BODY MASS INDEX: 39.06 KG/M2

## 2024-01-15 DIAGNOSIS — E53.8 B12 DEFICIENCY: ICD-10-CM

## 2024-01-15 DIAGNOSIS — G25.81 RESTLESS LEG SYNDROME: ICD-10-CM

## 2024-01-15 DIAGNOSIS — M85.89 OSTEOPENIA OF MULTIPLE SITES: ICD-10-CM

## 2024-01-15 DIAGNOSIS — M25.551 RIGHT HIP PAIN: ICD-10-CM

## 2024-01-15 DIAGNOSIS — K21.9 GASTROESOPHAGEAL REFLUX DISEASE WITHOUT ESOPHAGITIS: ICD-10-CM

## 2024-01-15 DIAGNOSIS — E78.5 HYPERLIPIDEMIA, UNSPECIFIED HYPERLIPIDEMIA TYPE: ICD-10-CM

## 2024-01-15 DIAGNOSIS — K58.0 IRRITABLE BOWEL SYNDROME WITH DIARRHEA: ICD-10-CM

## 2024-01-15 DIAGNOSIS — M25.552 LEFT HIP PAIN: ICD-10-CM

## 2024-01-15 DIAGNOSIS — E11.8 DIABETES MELLITUS TYPE 2 WITH COMPLICATIONS (HCC): ICD-10-CM

## 2024-01-15 DIAGNOSIS — E11.42 DIABETIC POLYNEUROPATHY ASSOCIATED WITH TYPE 2 DIABETES MELLITUS (HCC): ICD-10-CM

## 2024-01-15 DIAGNOSIS — F32.1 MODERATE SINGLE CURRENT EPISODE OF MAJOR DEPRESSIVE DISORDER (HCC): ICD-10-CM

## 2024-01-15 DIAGNOSIS — J30.1 CHRONIC SEASONAL ALLERGIC RHINITIS DUE TO POLLEN: ICD-10-CM

## 2024-01-15 DIAGNOSIS — M17.12 PRIMARY OSTEOARTHRITIS OF LEFT KNEE: ICD-10-CM

## 2024-01-15 DIAGNOSIS — R79.89 ABNORMAL THYROID BLOOD TEST: ICD-10-CM

## 2024-01-15 DIAGNOSIS — G47.33 OSA (OBSTRUCTIVE SLEEP APNEA): ICD-10-CM

## 2024-01-15 DIAGNOSIS — D63.1 ANEMIA DUE TO STAGE 3 CHRONIC KIDNEY DISEASE, UNSPECIFIED WHETHER STAGE 3A OR 3B CKD (HCC): ICD-10-CM

## 2024-01-15 DIAGNOSIS — N18.30 ANEMIA DUE TO STAGE 3 CHRONIC KIDNEY DISEASE, UNSPECIFIED WHETHER STAGE 3A OR 3B CKD (HCC): ICD-10-CM

## 2024-01-15 DIAGNOSIS — E66.01 CLASS 2 SEVERE OBESITY DUE TO EXCESS CALORIES WITH SERIOUS COMORBIDITY AND BODY MASS INDEX (BMI) OF 38.0 TO 38.9 IN ADULT (HCC): ICD-10-CM

## 2024-01-15 DIAGNOSIS — I10 HYPERTENSION, UNSPECIFIED TYPE: Primary | ICD-10-CM

## 2024-01-15 DIAGNOSIS — N18.32 STAGE 3B CHRONIC KIDNEY DISEASE (HCC): ICD-10-CM

## 2024-01-15 DIAGNOSIS — I10 HYPERTENSION, UNSPECIFIED TYPE: ICD-10-CM

## 2024-01-15 DIAGNOSIS — R25.1 TREMOR: ICD-10-CM

## 2024-01-15 DIAGNOSIS — G47.31 CENTRAL SLEEP APNEA: ICD-10-CM

## 2024-01-15 DIAGNOSIS — M43.10 SPONDYLOLISTHESIS, UNSPECIFIED SPINAL REGION: ICD-10-CM

## 2024-01-15 DIAGNOSIS — M54.50 CHRONIC BILATERAL LOW BACK PAIN WITHOUT SCIATICA: ICD-10-CM

## 2024-01-15 DIAGNOSIS — J45.20 MILD INTERMITTENT ASTHMA WITHOUT COMPLICATION: ICD-10-CM

## 2024-01-15 DIAGNOSIS — Z98.1 S/P LUMBAR FUSION: ICD-10-CM

## 2024-01-15 DIAGNOSIS — G89.29 CHRONIC LEFT SHOULDER PAIN: ICD-10-CM

## 2024-01-15 DIAGNOSIS — K44.9 HIATAL HERNIA: ICD-10-CM

## 2024-01-15 DIAGNOSIS — M15.9 GENERALIZED OSTEOARTHRITIS: ICD-10-CM

## 2024-01-15 DIAGNOSIS — F41.9 ANXIETY: ICD-10-CM

## 2024-01-15 DIAGNOSIS — M25.512 CHRONIC LEFT SHOULDER PAIN: ICD-10-CM

## 2024-01-15 DIAGNOSIS — G89.29 CHRONIC BILATERAL LOW BACK PAIN WITHOUT SCIATICA: ICD-10-CM

## 2024-01-15 LAB
25(OH)D3 SERPL-MCNC: 37.1 NG/ML
ALBUMIN SERPL-MCNC: 3.8 G/DL (ref 3.5–5.2)
ALBUMIN/GLOB SERPL: 1.7 {RATIO} (ref 1–2.5)
ALP SERPL-CCNC: 136 U/L (ref 35–104)
ALT SERPL-CCNC: 17 U/L (ref 5–33)
ANION GAP SERPL CALCULATED.3IONS-SCNC: 8 MMOL/L (ref 9–17)
AST SERPL-CCNC: 19 U/L
BILIRUB SERPL-MCNC: <0.1 MG/DL (ref 0.3–1.2)
BUN SERPL-MCNC: 29 MG/DL (ref 8–23)
CALCIUM SERPL-MCNC: 8.7 MG/DL (ref 8.6–10.4)
CHLORIDE SERPL-SCNC: 109 MMOL/L (ref 98–107)
CHOLEST SERPL-MCNC: 136 MG/DL
CHOLESTEROL/HDL RATIO: 2.6
CO2 SERPL-SCNC: 22 MMOL/L (ref 20–31)
CREAT SERPL-MCNC: 1.5 MG/DL (ref 0.5–0.9)
ERYTHROCYTE [DISTWIDTH] IN BLOOD BY AUTOMATED COUNT: 15 % (ref 11.8–14.4)
EST. AVERAGE GLUCOSE BLD GHB EST-MCNC: 134 MG/DL
FERRITIN SERPL-MCNC: 25 NG/ML (ref 13–150)
GFR SERPL CREATININE-BSD FRML MDRD: 36 ML/MIN/1.73M2
GLUCOSE SERPL-MCNC: 140 MG/DL (ref 70–99)
HBA1C MFR BLD: 6.3 % (ref 4–6)
HCT VFR BLD AUTO: 32.2 % (ref 36.3–47.1)
HDLC SERPL-MCNC: 52 MG/DL
HGB BLD-MCNC: 10.2 G/DL (ref 11.9–15.1)
IRON SATN MFR SERPL: 13 % (ref 20–55)
IRON SERPL-MCNC: 41 UG/DL (ref 37–145)
LDLC SERPL CALC-MCNC: 75 MG/DL (ref 0–130)
MCH RBC QN AUTO: 28.4 PG (ref 25.2–33.5)
MCHC RBC AUTO-ENTMCNC: 31.7 G/DL (ref 28.4–34.8)
MCV RBC AUTO: 89.7 FL (ref 82.6–102.9)
NRBC BLD-RTO: 0 PER 100 WBC
PLATELET # BLD AUTO: 241 K/UL (ref 138–453)
PMV BLD AUTO: 9.7 FL (ref 8.1–13.5)
POTASSIUM SERPL-SCNC: 5.6 MMOL/L (ref 3.7–5.3)
PROT SERPL-MCNC: 6 G/DL (ref 6.4–8.3)
RBC # BLD AUTO: 3.59 M/UL (ref 3.95–5.11)
SODIUM SERPL-SCNC: 139 MMOL/L (ref 135–144)
TIBC SERPL-MCNC: 324 UG/DL (ref 250–450)
TRIGL SERPL-MCNC: 47 MG/DL
UNSATURATED IRON BINDING CAPACITY: 283 UG/DL (ref 112–347)
VIT B12 SERPL-MCNC: 633 PG/ML (ref 232–1245)
WBC OTHER # BLD: 7.7 K/UL (ref 3.5–11.3)

## 2024-01-15 ASSESSMENT — PATIENT HEALTH QUESTIONNAIRE - PHQ9
1. LITTLE INTEREST OR PLEASURE IN DOING THINGS: 0
6. FEELING BAD ABOUT YOURSELF - OR THAT YOU ARE A FAILURE OR HAVE LET YOURSELF OR YOUR FAMILY DOWN: 0
2. FEELING DOWN, DEPRESSED OR HOPELESS: 0
SUM OF ALL RESPONSES TO PHQ QUESTIONS 1-9: 0
7. TROUBLE CONCENTRATING ON THINGS, SUCH AS READING THE NEWSPAPER OR WATCHING TELEVISION: 0
5. POOR APPETITE OR OVEREATING: 0
10. IF YOU CHECKED OFF ANY PROBLEMS, HOW DIFFICULT HAVE THESE PROBLEMS MADE IT FOR YOU TO DO YOUR WORK, TAKE CARE OF THINGS AT HOME, OR GET ALONG WITH OTHER PEOPLE: 0
SUM OF ALL RESPONSES TO PHQ9 QUESTIONS 1 & 2: 0
8. MOVING OR SPEAKING SO SLOWLY THAT OTHER PEOPLE COULD HAVE NOTICED. OR THE OPPOSITE, BEING SO FIGETY OR RESTLESS THAT YOU HAVE BEEN MOVING AROUND A LOT MORE THAN USUAL: 0
SUM OF ALL RESPONSES TO PHQ QUESTIONS 1-9: 0
3. TROUBLE FALLING OR STAYING ASLEEP: 0
4. FEELING TIRED OR HAVING LITTLE ENERGY: 0
SUM OF ALL RESPONSES TO PHQ QUESTIONS 1-9: 0
9. THOUGHTS THAT YOU WOULD BE BETTER OFF DEAD, OR OF HURTING YOURSELF: 0
SUM OF ALL RESPONSES TO PHQ QUESTIONS 1-9: 0

## 2024-01-15 NOTE — PROGRESS NOTES
General: No scleral icterus.        Right eye: No discharge.         Left eye: No discharge.      Extraocular Movements: Extraocular movements intact.      Conjunctiva/sclera: Conjunctivae normal.      Pupils: Pupils are equal, round, and reactive to light.   Neck:      Thyroid: No thyromegaly.      Vascular: No JVD.   Cardiovascular:      Rate and Rhythm: Normal rate and regular rhythm.      Pulses: Normal pulses.      Heart sounds: Murmur heard.   Pulmonary:      Effort: Pulmonary effort is normal.      Breath sounds: Normal breath sounds. No stridor. No wheezing or rales.   Abdominal:      General: Bowel sounds are normal.      Palpations: Abdomen is soft. There is no mass.      Tenderness: There is no abdominal tenderness. There is no right CVA tenderness or left CVA tenderness.   Musculoskeletal:      Left shoulder: Tenderness present. Decreased range of motion.      Cervical back: Normal range of motion and neck supple.      Thoracic back: Spasms and tenderness present.      Lumbar back: Tenderness present. Decreased range of motion.        Back:       Right hip: Tenderness present. Normal strength.      Left hip: Tenderness present. Normal strength.      Right knee: Normal.      Left knee: Decreased range of motion. Tenderness present over the medial joint line.      Right lower leg: No edema.      Left lower leg: No edema.   Lymphadenopathy:      Cervical: No cervical adenopathy.   Skin:     General: Skin is warm.      Capillary Refill: Capillary refill takes less than 2 seconds.   Neurological:      General: No focal deficit present.      Mental Status: She is alert and oriented to person, place, and time.      Cranial Nerves: No cranial nerve deficit.      Motor: Tremor present. No abnormal muscle tone.      Coordination: Coordination normal.      Deep Tendon Reflexes: Reflexes normal.   Psychiatric:         Attention and Perception: Attention normal.         Mood and Affect: Mood normal. Mood is not

## 2024-01-18 ENCOUNTER — HOSPITAL ENCOUNTER (OUTPATIENT)
Age: 74
Setting detail: SPECIMEN
Discharge: HOME | End: 2024-01-18

## 2024-01-18 ENCOUNTER — PROCEDURE VISIT (OUTPATIENT)
Dept: FAMILY MEDICINE CLINIC | Age: 74
End: 2024-01-18

## 2024-01-18 VITALS — TEMPERATURE: 96.6 F | HEART RATE: 60 BPM | SYSTOLIC BLOOD PRESSURE: 114 MMHG | DIASTOLIC BLOOD PRESSURE: 72 MMHG

## 2024-01-18 DIAGNOSIS — E87.5 HYPERKALEMIA: ICD-10-CM

## 2024-01-18 DIAGNOSIS — E77.8 HYPOPROTEINEMIA (HCC): ICD-10-CM

## 2024-01-18 DIAGNOSIS — N18.32 STAGE 3B CHRONIC KIDNEY DISEASE (HCC): ICD-10-CM

## 2024-01-18 DIAGNOSIS — M25.562 CHRONIC PAIN OF LEFT KNEE: Primary | ICD-10-CM

## 2024-01-18 DIAGNOSIS — M17.12 PRIMARY OSTEOARTHRITIS OF LEFT KNEE: ICD-10-CM

## 2024-01-18 DIAGNOSIS — G89.29 CHRONIC PAIN OF LEFT KNEE: Primary | ICD-10-CM

## 2024-01-18 LAB
ALBUMIN SERPL-MCNC: 4.1 G/DL (ref 3.5–5.2)
ALBUMIN/GLOB SERPL: 2 {RATIO} (ref 1–2.5)
ALP SERPL-CCNC: 137 U/L (ref 35–104)
ALT SERPL-CCNC: 20 U/L (ref 10–35)
ANION GAP SERPL CALCULATED.3IONS-SCNC: 9 MMOL/L (ref 9–16)
AST SERPL-CCNC: 21 U/L (ref 10–35)
BILIRUB SERPL-MCNC: 0.2 MG/DL (ref 0–1.2)
BUN SERPL-MCNC: 26 MG/DL (ref 8–23)
CALCIUM SERPL-MCNC: 9.2 MG/DL (ref 8.6–10.4)
CHLORIDE SERPL-SCNC: 107 MMOL/L (ref 98–107)
CO2 SERPL-SCNC: 24 MMOL/L (ref 20–31)
CREAT SERPL-MCNC: 1.5 MG/DL (ref 0.5–0.9)
GFR SERPL CREATININE-BSD FRML MDRD: 36 ML/MIN/1.73M2
GLUCOSE SERPL-MCNC: 140 MG/DL (ref 74–99)
POTASSIUM SERPL-SCNC: 5.3 MMOL/L (ref 3.7–5.3)
PROT SERPL-MCNC: 6.6 G/DL (ref 6.6–8.7)
SODIUM SERPL-SCNC: 140 MMOL/L (ref 136–145)

## 2024-01-18 RX ORDER — METHYLPREDNISOLONE ACETATE 40 MG/ML
40 INJECTION, SUSPENSION INTRA-ARTICULAR; INTRALESIONAL; INTRAMUSCULAR; SOFT TISSUE ONCE
Status: COMPLETED | OUTPATIENT
Start: 2024-01-18 | End: 2024-01-18

## 2024-01-18 RX ADMIN — METHYLPREDNISOLONE ACETATE 40 MG: 40 INJECTION, SUSPENSION INTRA-ARTICULAR; INTRALESIONAL; INTRAMUSCULAR; SOFT TISSUE at 13:48

## 2024-01-18 ASSESSMENT — ENCOUNTER SYMPTOMS: BACK PAIN: 1

## 2024-01-18 NOTE — PROGRESS NOTES
Codi Mcneill (:  1950) is a 73 y.o. female,Established patient, here for evaluation of the following chief complaint(s):    Injections (Left knee)         ASSESSMENT/PLAN:    1. Chronic pain of left knee  -     methylPREDNISolone acetate (DEPO-MEDROL) injection 40 mg; 40 mg, Intra-artICUlar, ONCE, 1 dose, On Thu 24 at 1400  -     58104 - AL DRAIN/INJECT LARGE JOINT/BURSA  2. Primary osteoarthritis of left knee  -     methylPREDNISolone acetate (DEPO-MEDROL) injection 40 mg; 40 mg, Intra-artICUlar, ONCE, 1 dose, On Thu 24 at 1400  -     23789 - AL DRAIN/INJECT LARGE JOINT/BURSA  3. Hyperkalemia  -     Comprehensive Metabolic Panel; Future  4. Hypoproteinemia (HCC)  -     Comprehensive Metabolic Panel; Future  5. Stage 3b chronic kidney disease (HCC)  -     Comprehensive Metabolic Panel; Future      Proceed with left knee steroid injection with Depo-Medrol 40 mg + 0.5 mL of 1% lidocaine - may do every 3 months as needed  Ice to left knee  Elevate left knee  Gentle range of motion exercises to left knee  Consider Ortho follow-up sooner rather than later if symptoms persist  Obtain repeat CMP  Consider follow-up with nephrology  Call with concerns      Return if symptoms worsen or fail to improve.         Subjective     HPI    Patient presents today for left knee injection in order to relieve pain from osteoarthritis.  Patient has known left knee osteoarthritis and was previously following with Ortho.  She has not seen Ortho for some time.  Her last x-ray was in 2019 and that showed tricompartmental osteoarthrosis with severe narrowing of the patellofemoral compartment.  She has been having worsening left knee pain.  She does not yet want to go back to see Ortho because her sister is going to have her knee replaced soon and she will be her primary caretaker.  We discussed a possible steroid injection and she would like to proceed with this today.  I did review her recent labs with her (from 3

## 2024-01-19 DIAGNOSIS — N18.30 ANEMIA DUE TO STAGE 3 CHRONIC KIDNEY DISEASE, UNSPECIFIED WHETHER STAGE 3A OR 3B CKD (HCC): ICD-10-CM

## 2024-01-19 DIAGNOSIS — E11.8 DIABETES MELLITUS TYPE 2 WITH COMPLICATIONS (HCC): Primary | ICD-10-CM

## 2024-01-19 DIAGNOSIS — D63.1 ANEMIA DUE TO STAGE 3 CHRONIC KIDNEY DISEASE, UNSPECIFIED WHETHER STAGE 3A OR 3B CKD (HCC): ICD-10-CM

## 2024-01-19 DIAGNOSIS — E78.5 HYPERLIPIDEMIA, UNSPECIFIED HYPERLIPIDEMIA TYPE: ICD-10-CM

## 2024-01-30 ENCOUNTER — OFFICE VISIT (OUTPATIENT)
Dept: NEUROLOGY | Age: 74
End: 2024-01-30
Payer: MEDICARE

## 2024-01-30 VITALS
WEIGHT: 213 LBS | BODY MASS INDEX: 36.37 KG/M2 | DIASTOLIC BLOOD PRESSURE: 54 MMHG | SYSTOLIC BLOOD PRESSURE: 103 MMHG | HEART RATE: 66 BPM | HEIGHT: 64 IN

## 2024-01-30 DIAGNOSIS — G25.9 ABNORMAL LEG MOVEMENT: ICD-10-CM

## 2024-01-30 DIAGNOSIS — R26.9 GAIT ABNORMALITY: ICD-10-CM

## 2024-01-30 DIAGNOSIS — R25.9 MIXED ACTION AND RESTING TREMOR: Primary | ICD-10-CM

## 2024-01-30 PROCEDURE — G8417 CALC BMI ABV UP PARAM F/U: HCPCS | Performed by: PHYSICIAN ASSISTANT

## 2024-01-30 PROCEDURE — 3017F COLORECTAL CA SCREEN DOC REV: CPT | Performed by: PHYSICIAN ASSISTANT

## 2024-01-30 PROCEDURE — 3078F DIAST BP <80 MM HG: CPT | Performed by: PHYSICIAN ASSISTANT

## 2024-01-30 PROCEDURE — G8427 DOCREV CUR MEDS BY ELIG CLIN: HCPCS | Performed by: PHYSICIAN ASSISTANT

## 2024-01-30 PROCEDURE — 1123F ACP DISCUSS/DSCN MKR DOCD: CPT | Performed by: PHYSICIAN ASSISTANT

## 2024-01-30 PROCEDURE — G8399 PT W/DXA RESULTS DOCUMENT: HCPCS | Performed by: PHYSICIAN ASSISTANT

## 2024-01-30 PROCEDURE — 3074F SYST BP LT 130 MM HG: CPT | Performed by: PHYSICIAN ASSISTANT

## 2024-01-30 PROCEDURE — 99204 OFFICE O/P NEW MOD 45 MIN: CPT | Performed by: PHYSICIAN ASSISTANT

## 2024-01-30 PROCEDURE — G8484 FLU IMMUNIZE NO ADMIN: HCPCS | Performed by: PHYSICIAN ASSISTANT

## 2024-01-30 PROCEDURE — 1090F PRES/ABSN URINE INCON ASSESS: CPT | Performed by: PHYSICIAN ASSISTANT

## 2024-01-30 PROCEDURE — 1036F TOBACCO NON-USER: CPT | Performed by: PHYSICIAN ASSISTANT

## 2024-01-30 RX ORDER — PRIMIDONE 50 MG/1
TABLET ORAL
Qty: 60 TABLET | Refills: 2 | Status: SHIPPED | OUTPATIENT
Start: 2024-01-30

## 2024-01-30 NOTE — PROGRESS NOTES
in the morning and at bedtime 60 tablet 12    atorvastatin (LIPITOR) 80 MG tablet Take 1 tablet by mouth daily 90 tablet 1    traZODone (DESYREL) 100 MG tablet TAKE 1 TO 2 TABLETS  NIGHTLY 180 tablet 1    gabapentin (NEURONTIN) 300 MG capsule Take 1 capsule by mouth 3 times daily. 270 capsule 1    b complex vitamins capsule Take 1 capsule by mouth daily      Cholecalciferol (VITAMIN D3) 50 MCG (2000 UT) CAPS Take by mouth daily      Handicap Placard MISC by Does not apply route Expires 3/26/2026 1 each 0    BD INSULIN SYRINGE ULTRAFINE 31G X 15/64\" 0.5 ML MISC       NOVOLIN 70/30 (70-30) 100 UNIT/ML injection vial Inject into the skin nightly 10 U at night with food      ACCU-CHEK TARIQ PLUS strip       aspirin 81 MG tablet Take 1 tablet by mouth nightly 30 tablet 3    glimepiride (AMARYL) 4 MG tablet Take 1 tablet by mouth every evening      B-D ULTRAFINE III SHORT PEN 31G X 8 MM MISC       omeprazole (PRILOSEC) 20 MG capsule Take 1 capsule by mouth nightly Indications: GERD-Related Laryngitis      oxyCODONE-acetaminophen (PERCOCET) 5-325 MG per tablet Take 2 tablets by mouth every 4 hours as needed (spondylisis) for up to 30 days. (Patient not taking: Reported on 9/27/2021) 30 tablet 0     No current facility-administered medications for this visit.        Allergies   Allergen Reactions    Demerol Hcl [Meperidine] Nausea And Vomiting    Victoza [Liraglutide] Other (See Comments)     Pancriatitis        REVIEW OF SYSTEMS:       All items selected indicate a positive finding.    Those items not selected are negative.  Constitutional [] Weight loss/gain   [] Fatigue  [] Fever/Chills   HEENT [] Hearing Loss  [] Visual Disturbance  [] Tinnitus  [] Eye pain  [] Vertigo   Respiratory [] Shortness of Breath  [] Cough  [] Snoring   Cardiovascular [] Chest Pain  [] Palpitations  [] Lightheaded   GI [] Constipation  [] Diarrhea  [] Swallowing change  [] Nausea/vomiting    [] Urinary Frequency  [] Urinary Urgency

## 2024-02-06 ENCOUNTER — HOSPITAL ENCOUNTER (OUTPATIENT)
Age: 74
Setting detail: SPECIMEN
Discharge: HOME | End: 2024-02-06

## 2024-02-06 DIAGNOSIS — R25.9 MIXED ACTION AND RESTING TREMOR: ICD-10-CM

## 2024-02-06 DIAGNOSIS — R26.9 GAIT ABNORMALITY: ICD-10-CM

## 2024-02-06 DIAGNOSIS — G25.9 ABNORMAL LEG MOVEMENT: ICD-10-CM

## 2024-02-06 LAB
CERULOPLASMIN SERPL-MCNC: 22 MG/DL (ref 16–45)
CRP SERPL HS-MCNC: <3 MG/L (ref 0–5)
MAGNESIUM SERPL-MCNC: 2 MG/DL (ref 1.6–2.6)

## 2024-02-08 ENCOUNTER — HOSPITAL ENCOUNTER (OUTPATIENT)
Dept: MRI IMAGING | Facility: CLINIC | Age: 74
Discharge: HOME | End: 2024-02-10
Payer: MEDICARE

## 2024-02-08 DIAGNOSIS — G25.9 ABNORMAL LEG MOVEMENT: ICD-10-CM

## 2024-02-08 DIAGNOSIS — R25.9 MIXED ACTION AND RESTING TREMOR: ICD-10-CM

## 2024-02-08 DIAGNOSIS — R26.9 GAIT ABNORMALITY: ICD-10-CM

## 2024-02-08 PROCEDURE — 70551 MRI BRAIN STEM W/O DYE: CPT

## 2024-02-09 LAB
ANA SER QL IA: NEGATIVE
DSDNA IGG SER QL IA: <0.5 IU/ML
NUCLEAR IGG SER IA-RTO: <0.1 U/ML

## 2024-03-04 DIAGNOSIS — B37.2 YEAST DERMATITIS: ICD-10-CM

## 2024-03-04 DIAGNOSIS — I10 HYPERTENSION, UNSPECIFIED TYPE: ICD-10-CM

## 2024-03-04 DIAGNOSIS — M43.10 SPONDYLOLISTHESIS, UNSPECIFIED SPINAL REGION: ICD-10-CM

## 2024-03-08 RX ORDER — NYSTATIN 100000 U/G
OINTMENT TOPICAL
Qty: 90 G | Refills: 1 | Status: SHIPPED | OUTPATIENT
Start: 2024-03-08 | End: 2025-03-08

## 2024-03-08 RX ORDER — LOSARTAN POTASSIUM 100 MG/1
100 TABLET ORAL DAILY
Qty: 90 TABLET | Refills: 1 | Status: SHIPPED | OUTPATIENT
Start: 2024-03-08 | End: 2025-03-08

## 2024-03-08 RX ORDER — TIZANIDINE 2 MG/1
2 TABLET ORAL EVERY 8 HOURS PRN
Qty: 270 TABLET | Refills: 1 | Status: SHIPPED | OUTPATIENT
Start: 2024-03-08 | End: 2025-03-08

## 2024-03-08 NOTE — TELEPHONE ENCOUNTER
LOV 1-15-24   RTO 5-16-24  LRF 12-19-23          Controlled Substance Monitoring:    Acute and Chronic Pain Monitoring:   RX Monitoring Periodic Controlled Substance Monitoring   2/28/2022  11:32 PM No signs of potential drug abuse or diversion identified.

## 2024-03-23 DIAGNOSIS — E78.5 HYPERLIPIDEMIA, UNSPECIFIED HYPERLIPIDEMIA TYPE: ICD-10-CM

## 2024-03-23 DIAGNOSIS — F32.9 MAJOR DEPRESSIVE DISORDER WITH CURRENT ACTIVE EPISODE, UNSPECIFIED DEPRESSION EPISODE SEVERITY, UNSPECIFIED WHETHER RECURRENT: ICD-10-CM

## 2024-03-25 RX ORDER — ATORVASTATIN CALCIUM 80 MG/1
80 TABLET, FILM COATED ORAL DAILY
Qty: 90 TABLET | Refills: 1 | Status: SHIPPED | OUTPATIENT
Start: 2024-03-25

## 2024-03-25 RX ORDER — TRAZODONE HYDROCHLORIDE 100 MG/1
TABLET ORAL
Qty: 180 TABLET | Refills: 1 | Status: SHIPPED | OUTPATIENT
Start: 2024-03-25

## 2024-03-25 NOTE — TELEPHONE ENCOUNTER
LOV 1/15/24  RTO 3 months; F/U scheduled  LRF 8/17/23     Health Maintenance   Topic Date Due    Annual Wellness Visit (Medicare Advantage)  01/01/2024    Lipids  01/15/2025    Depression Monitoring  01/15/2025    DTaP/Tdap/Td vaccine (2 - Td or Tdap) 04/17/2033    DEXA (modify frequency per FRAX score)  Completed    Flu vaccine  Completed    Shingles vaccine  Completed    Pneumococcal 65+ years Vaccine  Completed    COVID-19 Vaccine  Completed    Respiratory Syncytial Virus (RSV) Pregnant or age 60 yrs+  Completed    Hepatitis C screen  Completed    Hepatitis A vaccine  Aged Out    Hepatitis B vaccine  Aged Out    Hib vaccine  Aged Out    Polio vaccine  Aged Out    Meningococcal (ACWY) vaccine  Aged Out    Diabetic foot exam  Discontinued    A1C test (Diabetic or Prediabetic)  Discontinued    Diabetic Alb to Cr ratio (uACR) test  Discontinued    Diabetic retinal exam  Discontinued    GFR test (Diabetes, CKD 3-4, OR last GFR 15-59)  Discontinued    Breast cancer screen  Discontinued    Colorectal Cancer Screen  Discontinued             (applicable per patient's age: Cancer Screenings, Depression Screening, Fall Risk Screening, Immunizations)    Hemoglobin A1C (%)   Date Value   01/15/2024 6.3 (H)   09/18/2023 6.4 (H)   03/07/2023 6.7 (H)     LDL Cholesterol (mg/dL)   Date Value   01/15/2024 75     LDL Calculated (mg/dL)   Date Value   07/29/2013 82     AST (U/L)   Date Value   01/18/2024 21     ALT (U/L)   Date Value   01/18/2024 20     BUN (mg/dL)   Date Value   01/18/2024 26 (H)      (goal A1C is < 7)   (goal LDL is <100) need 30-50% reduction from baseline     BP Readings from Last 3 Encounters:   01/30/24 (!) 103/54   01/18/24 114/72   01/15/24 124/84    (goal /80)      All Future Testing planned in [facility]:  Lab Frequency Next Occurrence   Hemoglobin A1C Once 04/16/2024   Comprehensive Metabolic Panel Once 04/16/2024   Iron and TIBC Once 04/16/2024   Ferritin Once 04/16/2024   CBC Once 04/16/2024

## 2024-04-01 ENCOUNTER — OFFICE VISIT (OUTPATIENT)
Dept: NEUROLOGY | Age: 74
End: 2024-04-01
Payer: MEDICARE

## 2024-04-01 VITALS
DIASTOLIC BLOOD PRESSURE: 66 MMHG | HEIGHT: 64 IN | SYSTOLIC BLOOD PRESSURE: 125 MMHG | BODY MASS INDEX: 36.37 KG/M2 | WEIGHT: 213 LBS | HEART RATE: 65 BPM

## 2024-04-01 DIAGNOSIS — R26.9 GAIT ABNORMALITY: ICD-10-CM

## 2024-04-01 DIAGNOSIS — R26.89 IMBALANCE: ICD-10-CM

## 2024-04-01 DIAGNOSIS — R25.9 MIXED ACTION AND RESTING TREMOR: Primary | ICD-10-CM

## 2024-04-01 DIAGNOSIS — M54.2 CERVICALGIA: ICD-10-CM

## 2024-04-01 PROCEDURE — 1090F PRES/ABSN URINE INCON ASSESS: CPT | Performed by: PHYSICIAN ASSISTANT

## 2024-04-01 PROCEDURE — 1036F TOBACCO NON-USER: CPT | Performed by: PHYSICIAN ASSISTANT

## 2024-04-01 PROCEDURE — 99213 OFFICE O/P EST LOW 20 MIN: CPT | Performed by: PHYSICIAN ASSISTANT

## 2024-04-01 PROCEDURE — 3074F SYST BP LT 130 MM HG: CPT | Performed by: PHYSICIAN ASSISTANT

## 2024-04-01 PROCEDURE — G8399 PT W/DXA RESULTS DOCUMENT: HCPCS | Performed by: PHYSICIAN ASSISTANT

## 2024-04-01 PROCEDURE — 1123F ACP DISCUSS/DSCN MKR DOCD: CPT | Performed by: PHYSICIAN ASSISTANT

## 2024-04-01 PROCEDURE — G8417 CALC BMI ABV UP PARAM F/U: HCPCS | Performed by: PHYSICIAN ASSISTANT

## 2024-04-01 PROCEDURE — G8427 DOCREV CUR MEDS BY ELIG CLIN: HCPCS | Performed by: PHYSICIAN ASSISTANT

## 2024-04-01 PROCEDURE — 3078F DIAST BP <80 MM HG: CPT | Performed by: PHYSICIAN ASSISTANT

## 2024-04-01 NOTE — PROGRESS NOTES
disproportionate cerebellar atrophy. No NPH. She would like to avoid additional medication for management of tremor at this time. Labs for contributing causes of tremor were benign.    1. Mixed action and resting tremor  -     monitoring for now  - Calvin scan in future if there are further features of Parkinson's.  2. Gait abnormality  -     referral to balance therapy  3. RLS   - continue night gabapentin         HIEN Enciso     [facility]

## 2024-04-22 DIAGNOSIS — F32.0 CURRENT MILD EPISODE OF MAJOR DEPRESSIVE DISORDER WITHOUT PRIOR EPISODE (HCC): ICD-10-CM

## 2024-04-22 NOTE — TELEPHONE ENCOUNTER
LOV 1/15/24   RTO 5/16/24  LRF 11/28/23          Controlled Substance Monitoring:    Acute and Chronic Pain Monitoring:   RX Monitoring Periodic Controlled Substance Monitoring   2/28/2022  11:32 PM No signs of potential drug abuse or diversion identified.            
Clothing/Cell Phone/PDA (specify)

## 2024-04-22 NOTE — CONSULTS
[x] Stable [] Evolving  [] Unstable   Decision Making [x] Low [] Moderate [] High    [x] Low Complexity [] Moderate Complexity [] High Complexity     Rehab Potential:  [x] Good  [] Fair  [] Poor   Suggested Professional Referral:  [x] No  [] Yes:  Barriers to Goal Achievement::  [x] No  [] Yes:  Domestic Concerns:  [x] No  [] Yes:    Treatment Plan:  [x] Therapeutic Exercise   45589  [] Iontophoresis: 4 mg/mL Dexamethasone Sodium Phosphate  mAmin  18491   [] Therapeutic Activity  33402 [] Vasopneumatic cold with compression  03841    [x] Gait Training   58661 [] Ultrasound   85130   [x] Neuromuscular Re-education  88205 [] Electrical Stimulation Unattended  92247   [x] Manual Therapy  34427 [] Electrical Stimulation Attended  39943   [x] Instruction in HEP  [] Lumbar/Cervical Traction  89104   [] Aquatic Therapy   12636 [] Cold/hotpack    [] Massage   15216      [] Dry Needling, 1 or 2 muscles  19085   [] Biofeedback, first 15 minutes   90912  [] Biofeedback, additional 15 minutes   93721 [] Dry Needling, 3 or more muscles  37892     []  Medication allergies reviewed for use of    Dexamethasone Sodium Phosphate 4mg/ml     with iontophoresis treatments.   Pt is not allergic.       Frequency: 1-2 x/week for 12 visits (At this time, pt would like to attend 2-3 visits secondary to $40 copay)    Today’s Treatment:  Interventions completed in bold 4/29/24  INTERVENTIONS  Reps/ Time Comments        EXERCISES     Scap sets-arms extended 10x5\"    Tband myron ER 10x5\" No band   Cane flexion @ door          Feet together balance 2x1'    Tandem balance 2x1' offset   Standing Marches 10x One UE for balance   Trunk Ext @ counter 5x10\"    Sit to stand taps 10x From elevated table   3-way hip     Feet together balance c head nods     Minisquats                              MANUAL                MODALITIES                      Other:    Pt. Education:  [x] Plans/Goals, Risks/Benefits discussed  [x] Home exercise program  Method

## 2024-04-23 RX ORDER — FLUOXETINE HYDROCHLORIDE 40 MG/1
40 CAPSULE ORAL DAILY
Qty: 90 CAPSULE | Refills: 3 | Status: SHIPPED | OUTPATIENT
Start: 2024-04-23 | End: 2025-04-23

## 2024-04-29 ENCOUNTER — HOSPITAL ENCOUNTER (OUTPATIENT)
Age: 74
Setting detail: THERAPIES SERIES
Discharge: HOME | End: 2024-04-29
Payer: MEDICARE

## 2024-04-29 PROCEDURE — 97110 THERAPEUTIC EXERCISES: CPT

## 2024-04-29 PROCEDURE — 97161 PT EVAL LOW COMPLEX 20 MIN: CPT

## 2024-05-01 ENCOUNTER — APPOINTMENT (OUTPATIENT)
Age: 74
End: 2024-05-01
Payer: MEDICARE

## 2024-05-06 ENCOUNTER — HOSPITAL ENCOUNTER (OUTPATIENT)
Age: 74
Setting detail: SPECIMEN
Discharge: HOME | End: 2024-05-06

## 2024-05-06 ENCOUNTER — HOSPITAL ENCOUNTER (OUTPATIENT)
Age: 74
Setting detail: THERAPIES SERIES
Discharge: HOME | End: 2024-05-06
Payer: MEDICARE

## 2024-05-06 DIAGNOSIS — N18.30 ANEMIA DUE TO STAGE 3 CHRONIC KIDNEY DISEASE, UNSPECIFIED WHETHER STAGE 3A OR 3B CKD (HCC): ICD-10-CM

## 2024-05-06 DIAGNOSIS — E78.5 HYPERLIPIDEMIA, UNSPECIFIED HYPERLIPIDEMIA TYPE: ICD-10-CM

## 2024-05-06 DIAGNOSIS — E11.8 DIABETES MELLITUS TYPE 2 WITH COMPLICATIONS (HCC): ICD-10-CM

## 2024-05-06 DIAGNOSIS — D63.1 ANEMIA DUE TO STAGE 3 CHRONIC KIDNEY DISEASE, UNSPECIFIED WHETHER STAGE 3A OR 3B CKD (HCC): ICD-10-CM

## 2024-05-06 LAB
ALBUMIN SERPL-MCNC: 4.2 G/DL (ref 3.5–5.2)
ALBUMIN/GLOB SERPL: 2 {RATIO} (ref 1–2.5)
ALP SERPL-CCNC: 149 U/L (ref 35–104)
ALT SERPL-CCNC: 21 U/L (ref 10–35)
ANION GAP SERPL CALCULATED.3IONS-SCNC: 13 MMOL/L (ref 9–16)
AST SERPL-CCNC: 22 U/L (ref 10–35)
BILIRUB SERPL-MCNC: 0.3 MG/DL (ref 0–1.2)
BUN SERPL-MCNC: 27 MG/DL (ref 8–23)
CALCIUM SERPL-MCNC: 9.2 MG/DL (ref 8.6–10.4)
CHLORIDE SERPL-SCNC: 105 MMOL/L (ref 98–107)
CO2 SERPL-SCNC: 21 MMOL/L (ref 20–31)
CREAT SERPL-MCNC: 1.6 MG/DL (ref 0.5–0.9)
ERYTHROCYTE [DISTWIDTH] IN BLOOD BY AUTOMATED COUNT: 15.7 % (ref 11.8–14.4)
EST. AVERAGE GLUCOSE BLD GHB EST-MCNC: 180 MG/DL
FERRITIN SERPL-MCNC: 24 NG/ML (ref 13–150)
GFR, ESTIMATED: 34 ML/MIN/1.73M2
GLUCOSE SERPL-MCNC: 160 MG/DL (ref 74–99)
HBA1C MFR BLD: 7.9 % (ref 4–6)
HCT VFR BLD AUTO: 32.9 % (ref 36.3–47.1)
HGB BLD-MCNC: 10.3 G/DL (ref 11.9–15.1)
IRON SATN MFR SERPL: 26 % (ref 20–55)
IRON SERPL-MCNC: 89 UG/DL (ref 37–145)
MCH RBC QN AUTO: 28.3 PG (ref 25.2–33.5)
MCHC RBC AUTO-ENTMCNC: 31.3 G/DL (ref 28.4–34.8)
MCV RBC AUTO: 90.4 FL (ref 82.6–102.9)
NRBC BLD-RTO: 0 PER 100 WBC
PLATELET # BLD AUTO: 244 K/UL (ref 138–453)
PMV BLD AUTO: 9.8 FL (ref 8.1–13.5)
POTASSIUM SERPL-SCNC: 4.9 MMOL/L (ref 3.7–5.3)
PROT SERPL-MCNC: 6.8 G/DL (ref 6.6–8.7)
RBC # BLD AUTO: 3.64 M/UL (ref 3.95–5.11)
SODIUM SERPL-SCNC: 139 MMOL/L (ref 136–145)
TIBC SERPL-MCNC: 346 UG/DL (ref 250–450)
UNSATURATED IRON BINDING CAPACITY: 257 UG/DL (ref 112–347)
WBC OTHER # BLD: 8.1 K/UL (ref 3.5–11.3)

## 2024-05-06 PROCEDURE — 97112 NEUROMUSCULAR REEDUCATION: CPT

## 2024-05-06 PROCEDURE — 97110 THERAPEUTIC EXERCISES: CPT

## 2024-05-06 NOTE — FLOWSHEET NOTE
this date. Pt demonstrates consistent UE and LE tremors throughout exercises with weakness in UE and Les present. Good recall of home exercises demonstrated and patient educated to perform home program when she feels safe enough to do so (when having minimal tremors). Pt re-educated on importance of maintaining safety at home. Pt voices frustration with consistency of tremors and legs intermittently \"giving out\" with no warning. Provided patient with extensive HEP secondary to high copay. No increased pain reported at end of treatment session; pt does report increased fatigue.     [] No change.     [x] Other: Pt would like to hold on PT at this time and continue with her HEP from this point forward secondary to high copay. Pt to contact PT office if more visits are warranted. Pt to be discharged from outpatient PT if she does not contact PT office within 4 weeks.    [x] Patient would continue to benefit from skilled physical therapy services in order to: improve shoulder/hip/core strength, muscle flexibility, muscle endurance, posture, lumbar ROM, cervical ROM, balance reactions, and overall tolerance to daily activities.      GOALS:   STG: (to be met in 6 treatments)  Pt will self report worst pain no greater than 3/10 in order to better tolerate ADLs/work activities with minimal dysfunction  Pt will improve lumbar AROM to WFL in order to demonstrate ability to move/reach in all planes unrestricted at PLOF  Pt to demonstrate ability to perform sit to stand transfer from standard chair without use of UE support in order to improve quality of functional mobility  Pt demonstrates/verbalizes understanding of importance of maintaining safety during daily activities as well as techniques to decrease fall risk after discussion/issuance of fall risk prevention handout.   (04/29/24) Goal Met. Pt educated on fall risk prevention, demonstrates understanding, and provided with fall risk prevention handout.      LTG: (to be met

## 2024-05-08 ENCOUNTER — APPOINTMENT (OUTPATIENT)
Age: 74
End: 2024-05-08
Payer: MEDICARE

## 2024-05-13 ENCOUNTER — TELEPHONE (OUTPATIENT)
Dept: FAMILY MEDICINE CLINIC | Age: 74
End: 2024-05-13

## 2024-05-13 DIAGNOSIS — D64.9 ANEMIA, UNSPECIFIED TYPE: ICD-10-CM

## 2024-05-13 DIAGNOSIS — I10 HYPERTENSION, UNSPECIFIED TYPE: ICD-10-CM

## 2024-05-13 DIAGNOSIS — E78.5 HYPERLIPIDEMIA, UNSPECIFIED HYPERLIPIDEMIA TYPE: Primary | ICD-10-CM

## 2024-05-13 DIAGNOSIS — E77.8 HYPOPROTEINEMIA (HCC): ICD-10-CM

## 2024-05-13 DIAGNOSIS — E66.01 CLASS 2 SEVERE OBESITY DUE TO EXCESS CALORIES WITH SERIOUS COMORBIDITY AND BODY MASS INDEX (BMI) OF 38.0 TO 38.9 IN ADULT (HCC): ICD-10-CM

## 2024-05-13 DIAGNOSIS — E11.8 DIABETES MELLITUS TYPE 2 WITH COMPLICATIONS (HCC): ICD-10-CM

## 2024-05-16 ENCOUNTER — OFFICE VISIT (OUTPATIENT)
Dept: FAMILY MEDICINE CLINIC | Age: 74
End: 2024-05-16
Payer: MEDICARE

## 2024-05-16 VITALS
HEIGHT: 64 IN | TEMPERATURE: 97.6 F | DIASTOLIC BLOOD PRESSURE: 76 MMHG | BODY MASS INDEX: 36.19 KG/M2 | WEIGHT: 212 LBS | HEART RATE: 60 BPM | OXYGEN SATURATION: 98 % | SYSTOLIC BLOOD PRESSURE: 124 MMHG

## 2024-05-16 DIAGNOSIS — I10 HYPERTENSION, UNSPECIFIED TYPE: Primary | ICD-10-CM

## 2024-05-16 DIAGNOSIS — J45.20 MILD INTERMITTENT ASTHMA WITHOUT COMPLICATION: ICD-10-CM

## 2024-05-16 DIAGNOSIS — Z98.1 S/P LUMBAR FUSION: ICD-10-CM

## 2024-05-16 DIAGNOSIS — E78.5 HYPERLIPIDEMIA, UNSPECIFIED HYPERLIPIDEMIA TYPE: ICD-10-CM

## 2024-05-16 DIAGNOSIS — K21.9 GASTROESOPHAGEAL REFLUX DISEASE WITHOUT ESOPHAGITIS: ICD-10-CM

## 2024-05-16 DIAGNOSIS — E66.01 CLASS 2 SEVERE OBESITY DUE TO EXCESS CALORIES WITH SERIOUS COMORBIDITY AND BODY MASS INDEX (BMI) OF 38.0 TO 38.9 IN ADULT (HCC): ICD-10-CM

## 2024-05-16 DIAGNOSIS — F41.9 ANXIETY: ICD-10-CM

## 2024-05-16 DIAGNOSIS — M15.9 GENERALIZED OSTEOARTHRITIS: ICD-10-CM

## 2024-05-16 DIAGNOSIS — K58.0 IRRITABLE BOWEL SYNDROME WITH DIARRHEA: ICD-10-CM

## 2024-05-16 DIAGNOSIS — G25.81 RESTLESS LEG SYNDROME: ICD-10-CM

## 2024-05-16 DIAGNOSIS — M43.10 SPONDYLOLISTHESIS, UNSPECIFIED SPINAL REGION: ICD-10-CM

## 2024-05-16 DIAGNOSIS — E11.8 DIABETES MELLITUS TYPE 2 WITH COMPLICATIONS (HCC): ICD-10-CM

## 2024-05-16 DIAGNOSIS — E11.42 DIABETIC POLYNEUROPATHY ASSOCIATED WITH TYPE 2 DIABETES MELLITUS (HCC): ICD-10-CM

## 2024-05-16 DIAGNOSIS — D63.1 ANEMIA DUE TO STAGE 3 (MODERATE) CHRONIC RENAL FAILURE TREATED WITH ERYTHROPOIETIN (HCC): ICD-10-CM

## 2024-05-16 DIAGNOSIS — F32.1 MODERATE SINGLE CURRENT EPISODE OF MAJOR DEPRESSIVE DISORDER (HCC): ICD-10-CM

## 2024-05-16 DIAGNOSIS — M25.512 CHRONIC LEFT SHOULDER PAIN: ICD-10-CM

## 2024-05-16 DIAGNOSIS — E53.8 B12 DEFICIENCY: ICD-10-CM

## 2024-05-16 DIAGNOSIS — G47.33 OSA (OBSTRUCTIVE SLEEP APNEA): ICD-10-CM

## 2024-05-16 DIAGNOSIS — R25.1 TREMOR: ICD-10-CM

## 2024-05-16 DIAGNOSIS — M25.551 RIGHT HIP PAIN: ICD-10-CM

## 2024-05-16 DIAGNOSIS — J30.1 CHRONIC SEASONAL ALLERGIC RHINITIS DUE TO POLLEN: ICD-10-CM

## 2024-05-16 DIAGNOSIS — G89.29 CHRONIC LEFT SHOULDER PAIN: ICD-10-CM

## 2024-05-16 DIAGNOSIS — M25.552 LEFT HIP PAIN: ICD-10-CM

## 2024-05-16 DIAGNOSIS — M54.50 CHRONIC BILATERAL LOW BACK PAIN WITHOUT SCIATICA: ICD-10-CM

## 2024-05-16 DIAGNOSIS — N18.30 ANEMIA DUE TO STAGE 3 (MODERATE) CHRONIC RENAL FAILURE TREATED WITH ERYTHROPOIETIN (HCC): ICD-10-CM

## 2024-05-16 DIAGNOSIS — R79.89 ABNORMAL THYROID BLOOD TEST: ICD-10-CM

## 2024-05-16 DIAGNOSIS — M17.12 PRIMARY OSTEOARTHRITIS OF LEFT KNEE: ICD-10-CM

## 2024-05-16 DIAGNOSIS — K44.9 HIATAL HERNIA: ICD-10-CM

## 2024-05-16 DIAGNOSIS — M85.89 OSTEOPENIA OF MULTIPLE SITES: ICD-10-CM

## 2024-05-16 DIAGNOSIS — G89.29 CHRONIC BILATERAL LOW BACK PAIN WITHOUT SCIATICA: ICD-10-CM

## 2024-05-16 DIAGNOSIS — N18.32 STAGE 3B CHRONIC KIDNEY DISEASE (HCC): ICD-10-CM

## 2024-05-16 DIAGNOSIS — G47.31 CENTRAL SLEEP APNEA: ICD-10-CM

## 2024-05-16 PROCEDURE — G2211 COMPLEX E/M VISIT ADD ON: HCPCS | Performed by: PEDIATRICS

## 2024-05-16 PROCEDURE — G8427 DOCREV CUR MEDS BY ELIG CLIN: HCPCS | Performed by: PEDIATRICS

## 2024-05-16 PROCEDURE — 3078F DIAST BP <80 MM HG: CPT | Performed by: PEDIATRICS

## 2024-05-16 PROCEDURE — G8399 PT W/DXA RESULTS DOCUMENT: HCPCS | Performed by: PEDIATRICS

## 2024-05-16 PROCEDURE — 1036F TOBACCO NON-USER: CPT | Performed by: PEDIATRICS

## 2024-05-16 PROCEDURE — 3051F HG A1C>EQUAL 7.0%<8.0%: CPT | Performed by: PEDIATRICS

## 2024-05-16 PROCEDURE — 3074F SYST BP LT 130 MM HG: CPT | Performed by: PEDIATRICS

## 2024-05-16 PROCEDURE — G8417 CALC BMI ABV UP PARAM F/U: HCPCS | Performed by: PEDIATRICS

## 2024-05-16 PROCEDURE — 99214 OFFICE O/P EST MOD 30 MIN: CPT | Performed by: PEDIATRICS

## 2024-05-16 PROCEDURE — 1090F PRES/ABSN URINE INCON ASSESS: CPT | Performed by: PEDIATRICS

## 2024-05-16 PROCEDURE — 1123F ACP DISCUSS/DSCN MKR DOCD: CPT | Performed by: PEDIATRICS

## 2024-05-16 NOTE — PROGRESS NOTES
Head: Normocephalic.      Right Ear: Tympanic membrane, ear canal and external ear normal. There is no impacted cerumen.      Left Ear: Tympanic membrane, ear canal and external ear normal. There is no impacted cerumen.      Nose: Mucosal edema present. No congestion or rhinorrhea.      Mouth/Throat:      Mouth: Mucous membranes are moist.      Pharynx: No oropharyngeal exudate.   Eyes:      General: No scleral icterus.        Right eye: No discharge.         Left eye: No discharge.      Extraocular Movements: Extraocular movements intact.      Conjunctiva/sclera: Conjunctivae normal.      Pupils: Pupils are equal, round, and reactive to light.   Neck:      Thyroid: No thyromegaly.      Vascular: No JVD.   Cardiovascular:      Rate and Rhythm: Normal rate and regular rhythm.      Pulses: Normal pulses.      Heart sounds: Murmur heard.   Pulmonary:      Effort: Pulmonary effort is normal.      Breath sounds: Normal breath sounds. No stridor. No wheezing or rales.   Abdominal:      General: Bowel sounds are normal.      Palpations: Abdomen is soft. There is no mass.      Tenderness: There is no abdominal tenderness. There is no right CVA tenderness or left CVA tenderness.   Musculoskeletal:      Left shoulder: Tenderness present. Decreased range of motion.      Cervical back: Normal range of motion and neck supple.      Thoracic back: Spasms and tenderness present.      Lumbar back: Tenderness present. Decreased range of motion.        Back:       Right hip: Tenderness present. Normal strength.      Left hip: Tenderness present. Normal strength.      Right knee: Normal.      Left knee: Decreased range of motion. Tenderness present over the medial joint line.      Right lower leg: No edema.      Left lower leg: No edema.   Lymphadenopathy:      Cervical: No cervical adenopathy.   Skin:     General: Skin is warm.      Capillary Refill: Capillary refill takes less than 2 seconds.   Neurological:      General: No focal

## 2024-06-13 NOTE — THERAPY DISCHARGE
shows: 0/0  Date of initial visit: 4/29/24                Date of final visit: 5/6/24    Treatment to Date:  [] Therapeutic Exercise    [] Modalities:  [x] Therapeutic Activity    [] Ultrasound  [] Electrical Stimulation  [] Gait Training     [] Massage       [] Lumbar/Cervical Traction  [x] Neuromuscular Re-education [] Cold/hotpack [] Iontophoresis: 4 mg/mL  [] Instruction in Home Exercise Program                     Dexamethasone Sodium  [] Manual Therapy             Phosphate 40-80 mAmin  [] Aquatic Therapy                   [] Vasocompression/    [] Other:             Game Ready    Discharge Status:     [] Pt recovered from conditions. Treatment goals were met.    [] Pt received maximum benefit. No further therapy indicated at this time.    [] Pt to continue exercise/home instructions independently.    [] Therapy interrupted due to:    [] Pt has 2 or more no shows/cancels, is discontinued per our policy.    [] Pt has completed prescribed number of treatment sessions.    [x] Other: Per last treatment note: \"Pt would like to hold on PT at this time and continue with her HEP from this point forward secondary to high copay. Pt to contact PT office if more visits are warranted. Pt to be discharged from outpatient PT if she does not contact PT office within 4 weeks\"    Pt has not contacted PT office since last attended visit and is discharged from outpatient PT at this time.        Electronically signed by Shayna Leary PT on 6/13/2024 at 4:18 PM      If you have any questions or concerns, please don't hesitate to call.  Thank you for your referral.

## 2024-06-21 DIAGNOSIS — G25.81 RESTLESS LEG SYNDROME: ICD-10-CM

## 2024-06-21 NOTE — TELEPHONE ENCOUNTER
LOV 05/16/2024   RTO 06/27/2024  LRF 07/20/2023          Controlled Substance Monitoring:    Acute and Chronic Pain Monitoring:   RX Monitoring Periodic Controlled Substance Monitoring   2/28/2022  11:32 PM No signs of potential drug abuse or diversion identified.

## 2024-06-22 RX ORDER — GABAPENTIN 300 MG/1
300 CAPSULE ORAL 3 TIMES DAILY
Qty: 270 CAPSULE | Refills: 1 | Status: SHIPPED | OUTPATIENT
Start: 2024-06-22 | End: 2025-06-22

## 2024-06-27 ENCOUNTER — OFFICE VISIT (OUTPATIENT)
Dept: NEUROLOGY | Age: 74
End: 2024-06-27
Payer: MEDICARE

## 2024-06-27 VITALS
BODY MASS INDEX: 35.68 KG/M2 | HEART RATE: 63 BPM | SYSTOLIC BLOOD PRESSURE: 130 MMHG | DIASTOLIC BLOOD PRESSURE: 62 MMHG | WEIGHT: 209 LBS | OXYGEN SATURATION: 96 % | HEIGHT: 64 IN

## 2024-06-27 DIAGNOSIS — R26.9 GAIT ABNORMALITY: ICD-10-CM

## 2024-06-27 DIAGNOSIS — G25.81 RESTLESS LEG SYNDROME: ICD-10-CM

## 2024-06-27 DIAGNOSIS — R25.9 MIXED ACTION AND RESTING TREMOR: Primary | ICD-10-CM

## 2024-06-27 PROCEDURE — G8427 DOCREV CUR MEDS BY ELIG CLIN: HCPCS | Performed by: PHYSICIAN ASSISTANT

## 2024-06-27 PROCEDURE — G8399 PT W/DXA RESULTS DOCUMENT: HCPCS | Performed by: PHYSICIAN ASSISTANT

## 2024-06-27 PROCEDURE — 3078F DIAST BP <80 MM HG: CPT | Performed by: PHYSICIAN ASSISTANT

## 2024-06-27 PROCEDURE — 1123F ACP DISCUSS/DSCN MKR DOCD: CPT | Performed by: PHYSICIAN ASSISTANT

## 2024-06-27 PROCEDURE — G8417 CALC BMI ABV UP PARAM F/U: HCPCS | Performed by: PHYSICIAN ASSISTANT

## 2024-06-27 PROCEDURE — 1036F TOBACCO NON-USER: CPT | Performed by: PHYSICIAN ASSISTANT

## 2024-06-27 PROCEDURE — 3075F SYST BP GE 130 - 139MM HG: CPT | Performed by: PHYSICIAN ASSISTANT

## 2024-06-27 PROCEDURE — 1090F PRES/ABSN URINE INCON ASSESS: CPT | Performed by: PHYSICIAN ASSISTANT

## 2024-06-27 PROCEDURE — 99213 OFFICE O/P EST LOW 20 MIN: CPT | Performed by: PHYSICIAN ASSISTANT

## 2024-06-27 NOTE — PROGRESS NOTES
Nose: Nares normal no drainage    Throat: Lips and tongue normal; teeth normal;  normal gingiva   Neck: Supple, intact ROM  trachea midline;     Back:   Symmetric, no curvature, ROM adequate   Lungs:   Respirations unlabored   Heart:  Regular rate    Extremities: Extremities normal, no cyanosis, no edema   Skin: Skin color, texture normal, no rashes, no lesions       Mental status    Alert and oriented x 3; intact memory with no confusion, speech or language problems; no hallucinations or delusions     Cranial nerves    II - visual fields intact to confrontation  III, IV, VI - extra-ocular muscles full: no pupillary defect; no MARA, no nystagmus, no ptosis   V - normal facial sensation                                                               VII - normal facial symmetry                                                             VIII - intact hearing                                                                             IX, X - symmetrical palate                                                                  XI - symmetrical shoulder shrug                                                       XII - tongue midline without atrophy or fasciculation      Motor function  Normal muscle bulk and tone; strength 5/5 on all 4 extremities, no pronator drift   questionable coghweeling of bilateral wrist   Sensory function Bilat postural and intention tremor, equal. No head tremor noted. Mild resting tremor of right pinky finger noted.   mild festination with finger-thumb tap   Cerebellar Intact fine motor movement. No involuntary movements or tremors. No ataxia or dysmetria on finger to nose or heel to shin testing      Reflex function DTR 2+ on bilateral UE and LE, symmetric.       Gait                   antalgic, no shuffling. Arm swing and posture good        ASSESSMENT / PLAN:   Codi Mcneill is a 74 y.o. female that established care with neurology for tremor.    Plan unchanged from prior.     Failed

## 2024-08-04 DIAGNOSIS — B37.2 YEAST DERMATITIS: ICD-10-CM

## 2024-08-04 DIAGNOSIS — M43.10 SPONDYLOLISTHESIS, UNSPECIFIED SPINAL REGION: ICD-10-CM

## 2024-08-04 DIAGNOSIS — I10 HYPERTENSION, UNSPECIFIED TYPE: ICD-10-CM

## 2024-08-05 RX ORDER — NYSTATIN 100000 U/G
OINTMENT TOPICAL
Qty: 90 G | Refills: 3 | Status: SHIPPED | OUTPATIENT
Start: 2024-08-05

## 2024-08-05 RX ORDER — LOSARTAN POTASSIUM 100 MG/1
100 TABLET ORAL DAILY
Qty: 90 TABLET | Refills: 3 | Status: SHIPPED | OUTPATIENT
Start: 2024-08-05

## 2024-08-05 RX ORDER — TIZANIDINE 2 MG/1
TABLET ORAL
Qty: 270 TABLET | Refills: 3 | Status: SHIPPED | OUTPATIENT
Start: 2024-08-05

## 2024-08-05 NOTE — TELEPHONE ENCOUNTER
Codi Mcneill is calling to request a refill on the following medication(s):    Last Visit Date (If Applicable):  5/16/2024    Next Visit Date:    9/26/2024    Medication Request:  Requested Prescriptions     Pending Prescriptions Disp Refills    nystatin (MYCOSTATIN) 133416 UNIT/GM ointment [Pharmacy Med Name: NYSTATIN [id number] UNIT/GM Ointment] 90 g 3     Sig: APPLY TO THE AFFECTED AREA(S) TOPICALLY TWO TIMES DAILY    losartan (COZAAR) 100 MG tablet [Pharmacy Med Name: LOSARTAN POTASSIUM 100 MG Tablet] 90 tablet 3     Sig: TAKE 1 TABLET EVERY DAY    tiZANidine (ZANAFLEX) 2 MG tablet [Pharmacy Med Name: TIZANIDINE HCL 2 MG Tablet] 270 tablet 3     Sig: TAKE 1 TABLET EVERY 8 HOURS AS NEEDED (MUSCLE SPASMS)

## 2024-08-18 DIAGNOSIS — F32.9 MAJOR DEPRESSIVE DISORDER WITH CURRENT ACTIVE EPISODE, UNSPECIFIED DEPRESSION EPISODE SEVERITY, UNSPECIFIED WHETHER RECURRENT: ICD-10-CM

## 2024-08-18 DIAGNOSIS — E78.5 HYPERLIPIDEMIA, UNSPECIFIED HYPERLIPIDEMIA TYPE: ICD-10-CM

## 2024-08-20 NOTE — TELEPHONE ENCOUNTER
LOV 5/16/24   RTO 9/26/24  LRF 3/25/24          Controlled Substance Monitoring:    Acute and Chronic Pain Monitoring:   RX Monitoring Periodic Controlled Substance Monitoring   2/28/2022  11:32 PM No signs of potential drug abuse or diversion identified.              Abnormal WBC: < 4,000 OR > 12,000

## 2024-08-21 RX ORDER — TRAZODONE HYDROCHLORIDE 100 MG/1
TABLET ORAL
Qty: 180 TABLET | Refills: 3 | Status: SHIPPED | OUTPATIENT
Start: 2024-08-21

## 2024-08-21 RX ORDER — ATORVASTATIN CALCIUM 80 MG/1
80 TABLET, FILM COATED ORAL DAILY
Qty: 90 TABLET | Refills: 3 | Status: SHIPPED | OUTPATIENT
Start: 2024-08-21

## 2024-09-23 ENCOUNTER — HOSPITAL ENCOUNTER (OUTPATIENT)
Age: 74
Setting detail: SPECIMEN
Discharge: HOME | End: 2024-09-23

## 2024-09-23 DIAGNOSIS — E66.01 CLASS 2 SEVERE OBESITY DUE TO EXCESS CALORIES WITH SERIOUS COMORBIDITY AND BODY MASS INDEX (BMI) OF 38.0 TO 38.9 IN ADULT: ICD-10-CM

## 2024-09-23 DIAGNOSIS — E11.8 DIABETES MELLITUS TYPE 2 WITH COMPLICATIONS (HCC): ICD-10-CM

## 2024-09-23 DIAGNOSIS — D64.9 ANEMIA, UNSPECIFIED TYPE: ICD-10-CM

## 2024-09-23 DIAGNOSIS — E77.8 HYPOPROTEINEMIA (HCC): ICD-10-CM

## 2024-09-23 DIAGNOSIS — E78.5 HYPERLIPIDEMIA, UNSPECIFIED HYPERLIPIDEMIA TYPE: ICD-10-CM

## 2024-09-23 DIAGNOSIS — I10 HYPERTENSION, UNSPECIFIED TYPE: ICD-10-CM

## 2024-09-23 LAB
ALBUMIN SERPL-MCNC: 4 G/DL (ref 3.5–5.2)
ALBUMIN/GLOB SERPL: 2 {RATIO} (ref 1–2.5)
ALP SERPL-CCNC: 166 U/L (ref 35–104)
ALT SERPL-CCNC: 18 U/L (ref 10–35)
ANION GAP SERPL CALCULATED.3IONS-SCNC: 12 MMOL/L (ref 9–16)
AST SERPL-CCNC: 23 U/L (ref 10–35)
BILIRUB SERPL-MCNC: 0.3 MG/DL (ref 0–1.2)
BUN SERPL-MCNC: 26 MG/DL (ref 8–23)
CALCIUM SERPL-MCNC: 9 MG/DL (ref 8.6–10.4)
CHLORIDE SERPL-SCNC: 105 MMOL/L (ref 98–107)
CHOLEST SERPL-MCNC: 142 MG/DL (ref 0–199)
CHOLESTEROL/HDL RATIO: 3
CO2 SERPL-SCNC: 24 MMOL/L (ref 20–31)
CREAT SERPL-MCNC: 1.6 MG/DL (ref 0.5–0.9)
ERYTHROCYTE [DISTWIDTH] IN BLOOD BY AUTOMATED COUNT: 13.8 % (ref 11.8–14.4)
EST. AVERAGE GLUCOSE BLD GHB EST-MCNC: 154 MG/DL
FERRITIN SERPL-MCNC: 37 NG/ML (ref 13–150)
GFR, ESTIMATED: 35 ML/MIN/1.73M2
GLUCOSE SERPL-MCNC: 109 MG/DL (ref 74–99)
HBA1C MFR BLD: 7 % (ref 4–6)
HCT VFR BLD AUTO: 32.9 % (ref 36.3–47.1)
HDLC SERPL-MCNC: 42 MG/DL
HGB BLD-MCNC: 10.5 G/DL (ref 11.9–15.1)
IRON SATN MFR SERPL: 20 % (ref 20–55)
IRON SERPL-MCNC: 63 UG/DL (ref 37–145)
LDLC SERPL CALC-MCNC: 77 MG/DL (ref 0–100)
MCH RBC QN AUTO: 28.9 PG (ref 25.2–33.5)
MCHC RBC AUTO-ENTMCNC: 31.9 G/DL (ref 28.4–34.8)
MCV RBC AUTO: 90.6 FL (ref 82.6–102.9)
NRBC BLD-RTO: 0 PER 100 WBC
PLATELET # BLD AUTO: 222 K/UL (ref 138–453)
PMV BLD AUTO: 9.8 FL (ref 8.1–13.5)
POTASSIUM SERPL-SCNC: 4.7 MMOL/L (ref 3.7–5.3)
PROT SERPL-MCNC: 6.6 G/DL (ref 6.6–8.7)
RBC # BLD AUTO: 3.63 M/UL (ref 3.95–5.11)
SODIUM SERPL-SCNC: 141 MMOL/L (ref 136–145)
TIBC SERPL-MCNC: 314 UG/DL (ref 250–450)
TRIGL SERPL-MCNC: 118 MG/DL (ref 0–149)
UNSATURATED IRON BINDING CAPACITY: 251 UG/DL (ref 112–347)
VLDLC SERPL CALC-MCNC: 24 MG/DL
WBC OTHER # BLD: 7.7 K/UL (ref 3.5–11.3)

## 2024-09-26 ENCOUNTER — OFFICE VISIT (OUTPATIENT)
Dept: FAMILY MEDICINE CLINIC | Age: 74
End: 2024-09-26

## 2024-09-26 VITALS
DIASTOLIC BLOOD PRESSURE: 66 MMHG | HEART RATE: 51 BPM | SYSTOLIC BLOOD PRESSURE: 118 MMHG | WEIGHT: 210 LBS | TEMPERATURE: 97.9 F | BODY MASS INDEX: 38.64 KG/M2 | HEIGHT: 62 IN

## 2024-09-26 DIAGNOSIS — M17.12 PRIMARY OSTEOARTHRITIS OF LEFT KNEE: ICD-10-CM

## 2024-09-26 DIAGNOSIS — M43.10 SPONDYLOLISTHESIS, UNSPECIFIED SPINAL REGION: ICD-10-CM

## 2024-09-26 DIAGNOSIS — J30.1 CHRONIC SEASONAL ALLERGIC RHINITIS DUE TO POLLEN: ICD-10-CM

## 2024-09-26 DIAGNOSIS — E11.42 DIABETIC POLYNEUROPATHY ASSOCIATED WITH TYPE 2 DIABETES MELLITUS (HCC): ICD-10-CM

## 2024-09-26 DIAGNOSIS — Z98.1 S/P LUMBAR FUSION: ICD-10-CM

## 2024-09-26 DIAGNOSIS — E78.5 HYPERLIPIDEMIA, UNSPECIFIED HYPERLIPIDEMIA TYPE: ICD-10-CM

## 2024-09-26 DIAGNOSIS — K44.9 HIATAL HERNIA: ICD-10-CM

## 2024-09-26 DIAGNOSIS — G89.29 CHRONIC BILATERAL LOW BACK PAIN WITHOUT SCIATICA: ICD-10-CM

## 2024-09-26 DIAGNOSIS — E11.8 DIABETES MELLITUS TYPE 2 WITH COMPLICATIONS (HCC): ICD-10-CM

## 2024-09-26 DIAGNOSIS — D63.1 ANEMIA DUE TO STAGE 3B CHRONIC KIDNEY DISEASE (HCC): ICD-10-CM

## 2024-09-26 DIAGNOSIS — M25.552 LEFT HIP PAIN: ICD-10-CM

## 2024-09-26 DIAGNOSIS — F41.9 ANXIETY: ICD-10-CM

## 2024-09-26 DIAGNOSIS — G25.81 RESTLESS LEG SYNDROME: ICD-10-CM

## 2024-09-26 DIAGNOSIS — N18.32 ANEMIA DUE TO STAGE 3B CHRONIC KIDNEY DISEASE (HCC): ICD-10-CM

## 2024-09-26 DIAGNOSIS — I10 HYPERTENSION, UNSPECIFIED TYPE: ICD-10-CM

## 2024-09-26 DIAGNOSIS — R25.1 TREMOR: ICD-10-CM

## 2024-09-26 DIAGNOSIS — E53.8 B12 DEFICIENCY: ICD-10-CM

## 2024-09-26 DIAGNOSIS — R79.89 ABNORMAL THYROID BLOOD TEST: ICD-10-CM

## 2024-09-26 DIAGNOSIS — M25.512 CHRONIC LEFT SHOULDER PAIN: ICD-10-CM

## 2024-09-26 DIAGNOSIS — M85.89 OSTEOPENIA OF MULTIPLE SITES: ICD-10-CM

## 2024-09-26 DIAGNOSIS — F32.1 MODERATE SINGLE CURRENT EPISODE OF MAJOR DEPRESSIVE DISORDER (HCC): ICD-10-CM

## 2024-09-26 DIAGNOSIS — G89.29 CHRONIC LEFT SHOULDER PAIN: ICD-10-CM

## 2024-09-26 DIAGNOSIS — J45.20 MILD INTERMITTENT ASTHMA WITHOUT COMPLICATION: ICD-10-CM

## 2024-09-26 DIAGNOSIS — M25.551 RIGHT HIP PAIN: ICD-10-CM

## 2024-09-26 DIAGNOSIS — G47.31 CENTRAL SLEEP APNEA: ICD-10-CM

## 2024-09-26 DIAGNOSIS — E66.01 CLASS 2 SEVERE OBESITY DUE TO EXCESS CALORIES WITH SERIOUS COMORBIDITY AND BODY MASS INDEX (BMI) OF 38.0 TO 38.9 IN ADULT: ICD-10-CM

## 2024-09-26 DIAGNOSIS — M15.9 GENERALIZED OSTEOARTHRITIS: ICD-10-CM

## 2024-09-26 DIAGNOSIS — Z00.00 MEDICARE ANNUAL WELLNESS VISIT, SUBSEQUENT: Primary | ICD-10-CM

## 2024-09-26 DIAGNOSIS — M54.50 CHRONIC BILATERAL LOW BACK PAIN WITHOUT SCIATICA: ICD-10-CM

## 2024-09-26 DIAGNOSIS — K58.0 IRRITABLE BOWEL SYNDROME WITH DIARRHEA: ICD-10-CM

## 2024-09-26 DIAGNOSIS — G47.33 OSA (OBSTRUCTIVE SLEEP APNEA): ICD-10-CM

## 2024-09-26 DIAGNOSIS — K21.9 GASTROESOPHAGEAL REFLUX DISEASE WITHOUT ESOPHAGITIS: ICD-10-CM

## 2024-09-26 DIAGNOSIS — N18.32 STAGE 3B CHRONIC KIDNEY DISEASE (HCC): ICD-10-CM

## 2024-09-26 RX ORDER — GABAPENTIN 300 MG/1
300 CAPSULE ORAL 3 TIMES DAILY
Qty: 270 CAPSULE | Refills: 1 | Status: SHIPPED | OUTPATIENT
Start: 2024-09-26 | End: 2025-09-26

## 2024-09-26 SDOH — ECONOMIC STABILITY: FOOD INSECURITY: WITHIN THE PAST 12 MONTHS, YOU WORRIED THAT YOUR FOOD WOULD RUN OUT BEFORE YOU GOT MONEY TO BUY MORE.: NEVER TRUE

## 2024-09-26 SDOH — ECONOMIC STABILITY: INCOME INSECURITY: HOW HARD IS IT FOR YOU TO PAY FOR THE VERY BASICS LIKE FOOD, HOUSING, MEDICAL CARE, AND HEATING?: NOT HARD AT ALL

## 2024-09-26 SDOH — ECONOMIC STABILITY: FOOD INSECURITY: WITHIN THE PAST 12 MONTHS, THE FOOD YOU BOUGHT JUST DIDN'T LAST AND YOU DIDN'T HAVE MONEY TO GET MORE.: NEVER TRUE

## 2024-09-26 ASSESSMENT — ENCOUNTER SYMPTOMS
BACK PAIN: 1
CHOKING: 0
CHEST TIGHTNESS: 0
SORE THROAT: 0
ABDOMINAL PAIN: 0
WHEEZING: 0
EYE PAIN: 0
CONSTIPATION: 0
EYE DISCHARGE: 0
COUGH: 0
NAUSEA: 0
SINUS PRESSURE: 0
STRIDOR: 0
VOMITING: 0
RHINORRHEA: 0
TROUBLE SWALLOWING: 0
COLOR CHANGE: 0
SHORTNESS OF BREATH: 0
DIARRHEA: 1
EYE REDNESS: 0
BLOOD IN STOOL: 0

## 2024-09-26 ASSESSMENT — PATIENT HEALTH QUESTIONNAIRE - PHQ9
6. FEELING BAD ABOUT YOURSELF - OR THAT YOU ARE A FAILURE OR HAVE LET YOURSELF OR YOUR FAMILY DOWN: NOT AT ALL
2. FEELING DOWN, DEPRESSED OR HOPELESS: NOT AT ALL
5. POOR APPETITE OR OVEREATING: NOT AT ALL
SUM OF ALL RESPONSES TO PHQ QUESTIONS 1-9: 0
1. LITTLE INTEREST OR PLEASURE IN DOING THINGS: NOT AT ALL
SUM OF ALL RESPONSES TO PHQ9 QUESTIONS 1 & 2: 0
SUM OF ALL RESPONSES TO PHQ QUESTIONS 1-9: 0
9. THOUGHTS THAT YOU WOULD BE BETTER OFF DEAD, OR OF HURTING YOURSELF: NOT AT ALL
7. TROUBLE CONCENTRATING ON THINGS, SUCH AS READING THE NEWSPAPER OR WATCHING TELEVISION: NOT AT ALL
4. FEELING TIRED OR HAVING LITTLE ENERGY: NOT AT ALL
3. TROUBLE FALLING OR STAYING ASLEEP: NOT AT ALL
SUM OF ALL RESPONSES TO PHQ QUESTIONS 1-9: 0
10. IF YOU CHECKED OFF ANY PROBLEMS, HOW DIFFICULT HAVE THESE PROBLEMS MADE IT FOR YOU TO DO YOUR WORK, TAKE CARE OF THINGS AT HOME, OR GET ALONG WITH OTHER PEOPLE: NOT DIFFICULT AT ALL
SUM OF ALL RESPONSES TO PHQ QUESTIONS 1-9: 0
8. MOVING OR SPEAKING SO SLOWLY THAT OTHER PEOPLE COULD HAVE NOTICED. OR THE OPPOSITE, BEING SO FIGETY OR RESTLESS THAT YOU HAVE BEEN MOVING AROUND A LOT MORE THAN USUAL: NOT AT ALL

## 2024-09-26 ASSESSMENT — LIFESTYLE VARIABLES
HOW OFTEN DO YOU HAVE A DRINK CONTAINING ALCOHOL: NEVER
HOW MANY STANDARD DRINKS CONTAINING ALCOHOL DO YOU HAVE ON A TYPICAL DAY: PATIENT DOES NOT DRINK

## 2024-12-04 ENCOUNTER — OFFICE VISIT (OUTPATIENT)
Dept: NEUROLOGY | Age: 74
End: 2024-12-04
Payer: MEDICARE

## 2024-12-04 VITALS
WEIGHT: 207.4 LBS | DIASTOLIC BLOOD PRESSURE: 74 MMHG | BODY MASS INDEX: 36.75 KG/M2 | HEART RATE: 63 BPM | SYSTOLIC BLOOD PRESSURE: 149 MMHG | HEIGHT: 63 IN

## 2024-12-04 DIAGNOSIS — R26.9 GAIT ABNORMALITY: ICD-10-CM

## 2024-12-04 DIAGNOSIS — R25.9 MIXED ACTION AND RESTING TREMOR: Primary | ICD-10-CM

## 2024-12-04 DIAGNOSIS — G25.81 RESTLESS LEG SYNDROME: ICD-10-CM

## 2024-12-04 PROCEDURE — G8417 CALC BMI ABV UP PARAM F/U: HCPCS | Performed by: PHYSICIAN ASSISTANT

## 2024-12-04 PROCEDURE — 1090F PRES/ABSN URINE INCON ASSESS: CPT | Performed by: PHYSICIAN ASSISTANT

## 2024-12-04 PROCEDURE — G8484 FLU IMMUNIZE NO ADMIN: HCPCS | Performed by: PHYSICIAN ASSISTANT

## 2024-12-04 PROCEDURE — G8399 PT W/DXA RESULTS DOCUMENT: HCPCS | Performed by: PHYSICIAN ASSISTANT

## 2024-12-04 PROCEDURE — G8427 DOCREV CUR MEDS BY ELIG CLIN: HCPCS | Performed by: PHYSICIAN ASSISTANT

## 2024-12-04 PROCEDURE — 99213 OFFICE O/P EST LOW 20 MIN: CPT | Performed by: PHYSICIAN ASSISTANT

## 2024-12-04 PROCEDURE — 1160F RVW MEDS BY RX/DR IN RCRD: CPT | Performed by: PHYSICIAN ASSISTANT

## 2024-12-04 PROCEDURE — 3077F SYST BP >= 140 MM HG: CPT | Performed by: PHYSICIAN ASSISTANT

## 2024-12-04 PROCEDURE — 1036F TOBACCO NON-USER: CPT | Performed by: PHYSICIAN ASSISTANT

## 2024-12-04 PROCEDURE — 1123F ACP DISCUSS/DSCN MKR DOCD: CPT | Performed by: PHYSICIAN ASSISTANT

## 2024-12-04 PROCEDURE — 1159F MED LIST DOCD IN RCRD: CPT | Performed by: PHYSICIAN ASSISTANT

## 2024-12-04 PROCEDURE — 3078F DIAST BP <80 MM HG: CPT | Performed by: PHYSICIAN ASSISTANT

## 2025-01-23 ENCOUNTER — HOSPITAL ENCOUNTER (OUTPATIENT)
Age: 75
Setting detail: SPECIMEN
Discharge: HOME | End: 2025-01-23

## 2025-01-23 DIAGNOSIS — E78.5 HYPERLIPIDEMIA, UNSPECIFIED HYPERLIPIDEMIA TYPE: ICD-10-CM

## 2025-01-23 DIAGNOSIS — E11.8 DIABETES MELLITUS TYPE 2 WITH COMPLICATIONS (HCC): ICD-10-CM

## 2025-01-23 DIAGNOSIS — N18.32 STAGE 3B CHRONIC KIDNEY DISEASE (HCC): ICD-10-CM

## 2025-01-23 DIAGNOSIS — I10 HYPERTENSION, UNSPECIFIED TYPE: ICD-10-CM

## 2025-01-23 DIAGNOSIS — N18.32 ANEMIA DUE TO STAGE 3B CHRONIC KIDNEY DISEASE (HCC): ICD-10-CM

## 2025-01-23 DIAGNOSIS — M85.89 OSTEOPENIA OF MULTIPLE SITES: ICD-10-CM

## 2025-01-23 DIAGNOSIS — E53.8 B12 DEFICIENCY: ICD-10-CM

## 2025-01-23 DIAGNOSIS — D63.1 ANEMIA DUE TO STAGE 3B CHRONIC KIDNEY DISEASE (HCC): ICD-10-CM

## 2025-01-23 LAB
25(OH)D3 SERPL-MCNC: 40.9 NG/ML (ref 30–100)
ALBUMIN SERPL-MCNC: 4 G/DL (ref 3.5–5.2)
ALBUMIN/GLOB SERPL: 1.5 {RATIO} (ref 1–2.5)
ALP SERPL-CCNC: 160 U/L (ref 35–104)
ALT SERPL-CCNC: 16 U/L (ref 10–35)
ANION GAP SERPL CALCULATED.3IONS-SCNC: 11 MMOL/L (ref 9–16)
AST SERPL-CCNC: 19 U/L (ref 10–35)
BILIRUB SERPL-MCNC: 0.2 MG/DL (ref 0–1.2)
BUN SERPL-MCNC: 26 MG/DL (ref 8–23)
CALCIUM SERPL-MCNC: 9.9 MG/DL (ref 8.6–10.4)
CHLORIDE SERPL-SCNC: 105 MMOL/L (ref 98–107)
CO2 SERPL-SCNC: 24 MMOL/L (ref 20–31)
CREAT SERPL-MCNC: 1.4 MG/DL (ref 0.6–0.9)
ERYTHROCYTE [DISTWIDTH] IN BLOOD BY AUTOMATED COUNT: 13.7 % (ref 11.8–14.4)
EST. AVERAGE GLUCOSE BLD GHB EST-MCNC: 171 MG/DL
FERRITIN SERPL-MCNC: 20 NG/ML
GFR, ESTIMATED: 39 ML/MIN/1.73M2
GLUCOSE SERPL-MCNC: 150 MG/DL (ref 74–99)
HBA1C MFR BLD: 7.6 % (ref 4–6)
HCT VFR BLD AUTO: 31.9 % (ref 36.3–47.1)
HGB BLD-MCNC: 9.9 G/DL (ref 11.9–15.1)
IRON SATN MFR SERPL: 12 % (ref 20–55)
IRON SERPL-MCNC: 41 UG/DL (ref 37–145)
MCH RBC QN AUTO: 27.7 PG (ref 25.2–33.5)
MCHC RBC AUTO-ENTMCNC: 31 G/DL (ref 28.4–34.8)
MCV RBC AUTO: 89.1 FL (ref 82.6–102.9)
NRBC BLD-RTO: 0 PER 100 WBC
PLATELET # BLD AUTO: 265 K/UL (ref 138–453)
PMV BLD AUTO: 9.7 FL (ref 8.1–13.5)
POTASSIUM SERPL-SCNC: 5.2 MMOL/L (ref 3.7–5.3)
PROT SERPL-MCNC: 6.7 G/DL (ref 6.6–8.7)
RBC # BLD AUTO: 3.58 M/UL (ref 3.95–5.11)
SODIUM SERPL-SCNC: 140 MMOL/L (ref 136–145)
TIBC SERPL-MCNC: 350 UG/DL (ref 250–450)
UNSATURATED IRON BINDING CAPACITY: 309 UG/DL (ref 112–347)
VIT B12 SERPL-MCNC: 921 PG/ML (ref 232–1245)
WBC OTHER # BLD: 8.4 K/UL (ref 3.5–11.3)

## 2025-01-29 ENCOUNTER — OFFICE VISIT (OUTPATIENT)
Dept: FAMILY MEDICINE CLINIC | Age: 75
End: 2025-01-29

## 2025-01-29 VITALS
DIASTOLIC BLOOD PRESSURE: 78 MMHG | BODY MASS INDEX: 37.92 KG/M2 | OXYGEN SATURATION: 99 % | HEIGHT: 63 IN | TEMPERATURE: 97.6 F | SYSTOLIC BLOOD PRESSURE: 130 MMHG | WEIGHT: 214 LBS | HEART RATE: 60 BPM

## 2025-01-29 DIAGNOSIS — E66.01 CLASS 2 SEVERE OBESITY DUE TO EXCESS CALORIES WITH SERIOUS COMORBIDITY AND BODY MASS INDEX (BMI) OF 38.0 TO 38.9 IN ADULT: ICD-10-CM

## 2025-01-29 DIAGNOSIS — G25.81 RESTLESS LEG SYNDROME: ICD-10-CM

## 2025-01-29 DIAGNOSIS — J45.20 MILD INTERMITTENT ASTHMA WITHOUT COMPLICATION: ICD-10-CM

## 2025-01-29 DIAGNOSIS — D63.1 ANEMIA DUE TO STAGE 3B CHRONIC KIDNEY DISEASE (HCC): ICD-10-CM

## 2025-01-29 DIAGNOSIS — G89.29 CHRONIC LEFT SHOULDER PAIN: ICD-10-CM

## 2025-01-29 DIAGNOSIS — J30.1 CHRONIC SEASONAL ALLERGIC RHINITIS DUE TO POLLEN: ICD-10-CM

## 2025-01-29 DIAGNOSIS — R25.1 TREMOR: ICD-10-CM

## 2025-01-29 DIAGNOSIS — Z98.1 S/P LUMBAR FUSION: ICD-10-CM

## 2025-01-29 DIAGNOSIS — E11.42 DIABETIC POLYNEUROPATHY ASSOCIATED WITH TYPE 2 DIABETES MELLITUS (HCC): ICD-10-CM

## 2025-01-29 DIAGNOSIS — F32.1 MODERATE SINGLE CURRENT EPISODE OF MAJOR DEPRESSIVE DISORDER (HCC): ICD-10-CM

## 2025-01-29 DIAGNOSIS — G89.29 CHRONIC BILATERAL LOW BACK PAIN WITHOUT SCIATICA: ICD-10-CM

## 2025-01-29 DIAGNOSIS — G47.31 CENTRAL SLEEP APNEA: ICD-10-CM

## 2025-01-29 DIAGNOSIS — E11.8 DIABETES MELLITUS TYPE 2 WITH COMPLICATIONS (HCC): ICD-10-CM

## 2025-01-29 DIAGNOSIS — K44.9 HIATAL HERNIA: ICD-10-CM

## 2025-01-29 DIAGNOSIS — K21.9 GASTROESOPHAGEAL REFLUX DISEASE WITHOUT ESOPHAGITIS: ICD-10-CM

## 2025-01-29 DIAGNOSIS — Z12.31 SCREENING MAMMOGRAM FOR BREAST CANCER: ICD-10-CM

## 2025-01-29 DIAGNOSIS — M15.9 GENERALIZED OSTEOARTHRITIS: ICD-10-CM

## 2025-01-29 DIAGNOSIS — M25.551 RIGHT HIP PAIN: ICD-10-CM

## 2025-01-29 DIAGNOSIS — G47.33 OSA (OBSTRUCTIVE SLEEP APNEA): ICD-10-CM

## 2025-01-29 DIAGNOSIS — N18.32 STAGE 3B CHRONIC KIDNEY DISEASE (HCC): ICD-10-CM

## 2025-01-29 DIAGNOSIS — F41.9 ANXIETY: ICD-10-CM

## 2025-01-29 DIAGNOSIS — I10 HYPERTENSION, UNSPECIFIED TYPE: Primary | ICD-10-CM

## 2025-01-29 DIAGNOSIS — L73.9 FOLLICULITIS: ICD-10-CM

## 2025-01-29 DIAGNOSIS — E78.5 HYPERLIPIDEMIA, UNSPECIFIED HYPERLIPIDEMIA TYPE: ICD-10-CM

## 2025-01-29 DIAGNOSIS — M54.50 CHRONIC BILATERAL LOW BACK PAIN WITHOUT SCIATICA: ICD-10-CM

## 2025-01-29 DIAGNOSIS — M25.552 LEFT HIP PAIN: ICD-10-CM

## 2025-01-29 DIAGNOSIS — M25.512 CHRONIC LEFT SHOULDER PAIN: ICD-10-CM

## 2025-01-29 DIAGNOSIS — N18.32 ANEMIA DUE TO STAGE 3B CHRONIC KIDNEY DISEASE (HCC): ICD-10-CM

## 2025-01-29 DIAGNOSIS — R79.89 ABNORMAL THYROID BLOOD TEST: ICD-10-CM

## 2025-01-29 DIAGNOSIS — E66.812 CLASS 2 SEVERE OBESITY DUE TO EXCESS CALORIES WITH SERIOUS COMORBIDITY AND BODY MASS INDEX (BMI) OF 38.0 TO 38.9 IN ADULT: ICD-10-CM

## 2025-01-29 DIAGNOSIS — E53.8 B12 DEFICIENCY: ICD-10-CM

## 2025-01-29 DIAGNOSIS — M43.10 SPONDYLOLISTHESIS, UNSPECIFIED SPINAL REGION: ICD-10-CM

## 2025-01-29 DIAGNOSIS — K58.0 IRRITABLE BOWEL SYNDROME WITH DIARRHEA: ICD-10-CM

## 2025-01-29 DIAGNOSIS — M17.12 PRIMARY OSTEOARTHRITIS OF LEFT KNEE: ICD-10-CM

## 2025-01-29 DIAGNOSIS — M85.89 OSTEOPENIA OF MULTIPLE SITES: ICD-10-CM

## 2025-01-29 RX ORDER — FLUOXETINE 40 MG/1
40 CAPSULE ORAL DAILY
Qty: 90 CAPSULE | Refills: 1 | Status: SHIPPED | OUTPATIENT
Start: 2025-01-29

## 2025-01-29 RX ORDER — MUPIROCIN 20 MG/G
OINTMENT TOPICAL
Qty: 90 G | Refills: 0 | Status: SHIPPED | OUTPATIENT
Start: 2025-01-29 | End: 2025-02-05

## 2025-01-29 SDOH — ECONOMIC STABILITY: FOOD INSECURITY: WITHIN THE PAST 12 MONTHS, YOU WORRIED THAT YOUR FOOD WOULD RUN OUT BEFORE YOU GOT MONEY TO BUY MORE.: NEVER TRUE

## 2025-01-29 SDOH — ECONOMIC STABILITY: FOOD INSECURITY: WITHIN THE PAST 12 MONTHS, THE FOOD YOU BOUGHT JUST DIDN'T LAST AND YOU DIDN'T HAVE MONEY TO GET MORE.: NEVER TRUE

## 2025-01-29 ASSESSMENT — PATIENT HEALTH QUESTIONNAIRE - PHQ9
3. TROUBLE FALLING OR STAYING ASLEEP: NOT AT ALL
6. FEELING BAD ABOUT YOURSELF - OR THAT YOU ARE A FAILURE OR HAVE LET YOURSELF OR YOUR FAMILY DOWN: NOT AT ALL
SUM OF ALL RESPONSES TO PHQ QUESTIONS 1-9: 0
4. FEELING TIRED OR HAVING LITTLE ENERGY: NOT AT ALL
SUM OF ALL RESPONSES TO PHQ9 QUESTIONS 1 & 2: 0
SUM OF ALL RESPONSES TO PHQ QUESTIONS 1-9: 0
10. IF YOU CHECKED OFF ANY PROBLEMS, HOW DIFFICULT HAVE THESE PROBLEMS MADE IT FOR YOU TO DO YOUR WORK, TAKE CARE OF THINGS AT HOME, OR GET ALONG WITH OTHER PEOPLE: NOT DIFFICULT AT ALL
SUM OF ALL RESPONSES TO PHQ QUESTIONS 1-9: 0
5. POOR APPETITE OR OVEREATING: NOT AT ALL
7. TROUBLE CONCENTRATING ON THINGS, SUCH AS READING THE NEWSPAPER OR WATCHING TELEVISION: NOT AT ALL
2. FEELING DOWN, DEPRESSED OR HOPELESS: NOT AT ALL
SUM OF ALL RESPONSES TO PHQ QUESTIONS 1-9: 0
8. MOVING OR SPEAKING SO SLOWLY THAT OTHER PEOPLE COULD HAVE NOTICED. OR THE OPPOSITE, BEING SO FIGETY OR RESTLESS THAT YOU HAVE BEEN MOVING AROUND A LOT MORE THAN USUAL: NOT AT ALL
9. THOUGHTS THAT YOU WOULD BE BETTER OFF DEAD, OR OF HURTING YOURSELF: NOT AT ALL
1. LITTLE INTEREST OR PLEASURE IN DOING THINGS: NOT AT ALL

## 2025-01-29 NOTE — PROGRESS NOTES
Pupils: Pupils are equal, round, and reactive to light.   Neck:      Thyroid: No thyromegaly.      Vascular: No JVD.   Cardiovascular:      Rate and Rhythm: Normal rate and regular rhythm.      Pulses: Normal pulses.      Heart sounds: Normal heart sounds. No murmur heard.  Pulmonary:      Effort: Pulmonary effort is normal. No respiratory distress.      Breath sounds: Normal breath sounds. No stridor. No wheezing, rhonchi or rales.   Chest:      Chest wall: No tenderness.   Abdominal:      General: Bowel sounds are normal.      Palpations: Abdomen is soft. There is no mass.      Tenderness: There is no abdominal tenderness. There is no right CVA tenderness, left CVA tenderness or guarding.   Musculoskeletal:         General: Normal range of motion.      Left shoulder: Tenderness present.      Cervical back: Normal range of motion and neck supple.      Thoracic back: Spasms and tenderness present.      Lumbar back: Tenderness present.        Back:       Right hip: Tenderness present. Normal strength.      Left hip: Tenderness present. Normal strength.      Right knee: Normal.      Left knee: Tenderness present over the medial joint line.      Right lower leg: No edema.      Left lower leg: No edema.   Lymphadenopathy:      Cervical: No cervical adenopathy.   Skin:     General: Skin is warm and dry.      Capillary Refill: Capillary refill takes less than 2 seconds.      Findings: No erythema or rash.          Neurological:      General: No focal deficit present.      Mental Status: She is alert and oriented to person, place, and time.      Cranial Nerves: No cranial nerve deficit.      Motor: Tremor present. No abnormal muscle tone.      Coordination: Coordination normal.      Deep Tendon Reflexes: Reflexes are normal and symmetric. Reflexes normal.   Psychiatric:         Attention and Perception: Attention normal.         Mood and Affect: Mood normal. Mood is not anxious or depressed.         Speech: Speech normal.

## 2025-04-17 DIAGNOSIS — G25.81 RESTLESS LEG SYNDROME: ICD-10-CM

## 2025-04-17 NOTE — TELEPHONE ENCOUNTER
LOV 01/29/2025   RTO 05/29/25  LRF 09/26/24          Controlled Substance Monitoring:    Acute and Chronic Pain Monitoring:   RX Monitoring Periodic Controlled Substance Monitoring   2/28/2022  11:32 PM No signs of potential drug abuse or diversion identified.

## 2025-04-18 RX ORDER — GABAPENTIN 300 MG/1
300 CAPSULE ORAL 3 TIMES DAILY
Qty: 270 CAPSULE | Refills: 1 | Status: SHIPPED | OUTPATIENT
Start: 2025-04-18 | End: 2026-04-18

## 2025-05-01 RX ORDER — MUPIROCIN 20 MG/G
OINTMENT TOPICAL 3 TIMES DAILY
Qty: 90 G | Refills: 3 | Status: SHIPPED | OUTPATIENT
Start: 2025-05-01

## 2025-05-01 NOTE — TELEPHONE ENCOUNTER
LOV 04/07/2025   RTO 05/29/2025  LRF 01/29/2025          Controlled Substance Monitoring:    Acute and Chronic Pain Monitoring:   RX Monitoring Periodic Controlled Substance Monitoring   2/28/2022  11:32 PM No signs of potential drug abuse or diversion identified.

## 2025-05-06 ENCOUNTER — APPOINTMENT (OUTPATIENT)
Dept: GENERAL RADIOLOGY | Age: 75
End: 2025-05-06
Payer: MEDICARE

## 2025-05-06 ENCOUNTER — HOSPITAL ENCOUNTER (EMERGENCY)
Age: 75
Discharge: HOME | End: 2025-05-07
Attending: EMERGENCY MEDICINE
Payer: MEDICARE

## 2025-05-06 VITALS
DIASTOLIC BLOOD PRESSURE: 68 MMHG | HEART RATE: 78 BPM | BODY MASS INDEX: 36.7 KG/M2 | RESPIRATION RATE: 18 BRPM | TEMPERATURE: 97.9 F | WEIGHT: 215 LBS | SYSTOLIC BLOOD PRESSURE: 157 MMHG | OXYGEN SATURATION: 98 % | HEIGHT: 64 IN

## 2025-05-06 DIAGNOSIS — S89.91XA KNEE INJURY, RIGHT, INITIAL ENCOUNTER: Primary | ICD-10-CM

## 2025-05-06 PROCEDURE — 73562 X-RAY EXAM OF KNEE 3: CPT

## 2025-05-06 PROCEDURE — 99283 EMERGENCY DEPT VISIT LOW MDM: CPT

## 2025-05-06 PROCEDURE — 6370000000 HC RX 637 (ALT 250 FOR IP)

## 2025-05-06 PROCEDURE — 73590 X-RAY EXAM OF LOWER LEG: CPT

## 2025-05-06 RX ORDER — HYDROCODONE BITARTRATE AND ACETAMINOPHEN 5; 325 MG/1; MG/1
1 TABLET ORAL ONCE
Refills: 0 | Status: COMPLETED | OUTPATIENT
Start: 2025-05-06 | End: 2025-05-06

## 2025-05-06 RX ADMIN — HYDROCODONE BITARTRATE AND ACETAMINOPHEN 1 TABLET: 5; 325 TABLET ORAL at 23:06

## 2025-05-06 ASSESSMENT — PAIN DESCRIPTION - LOCATION: LOCATION: KNEE

## 2025-05-06 ASSESSMENT — ENCOUNTER SYMPTOMS
COLOR CHANGE: 1
SHORTNESS OF BREATH: 0
WHEEZING: 0

## 2025-05-06 ASSESSMENT — PAIN SCALES - GENERAL: PAINLEVEL_OUTOF10: 9

## 2025-05-06 ASSESSMENT — PAIN DESCRIPTION - ORIENTATION: ORIENTATION: RIGHT

## 2025-05-06 ASSESSMENT — PAIN - FUNCTIONAL ASSESSMENT: PAIN_FUNCTIONAL_ASSESSMENT: 0-10

## 2025-05-07 NOTE — ED NOTES
Left knee injury after fall. Pt has hx of knee replacement to this knee. Knee is swollen, no redness or open area at this time.

## 2025-05-07 NOTE — ED PROVIDER NOTES
RIGHT (2 VIEWS)     Standing Status:   Standing     Number of Occurrences:   1     Reason for exam::   knee and leg pain              CLINICAL DECISION MAKING:    DDx include, but are not limited to: Contusion, fracture, ligament/tendon injury      Codi Mcneill is a 75 y.o. female who presents to the emergency department with right knee pain, swelling, bruising. She rates her pain 9 out of 10.  She reports pain with ambulation and has had to use a walker since the incident.    Her past medical history is positive for a total knee replacement of the right knee.    XR of right knee, tibia, fibula ordered.  Imaging was reviewed and reported by the radiologist. Results showed no acute bony abnormality or fracture.  TKA hardware intact without complication. Norco given for pain control.  Patient reports improvement of pain.  Will refer patient to orthopedics for outpatient follow-up.  Patient is stable and appropriate for discharge.    The patient was involved in his/her plan of care through shared decision making. The testing that was ordered was discussed with the patient. Any medications that may have been ordered were discussed with the patient. I have reviewed the patient's previous medical records using the electronic health record that we have available that were pertinent to today's visit.      The patient was advised to not drink alcohol, drive, or operate heavy machinery while taking the Norco. Recommended patient rest, ice, compress, elevate the right knee.  Use a walker as needed for ambulation.  Follow-up with orthopedics as needed for further evaluation management.  Recommended patient follow up with PCP for further evaluation and management. Return to ED if patient develops worsening pain, swelling, bruising, numbness, tingling, and inability to bear weight.  Evaluation and treatment course in the ED, and plan of care upon discharge was discussed in length with the patient. Patient had no further

## 2025-05-16 ENCOUNTER — HOSPITAL ENCOUNTER (OUTPATIENT)
Age: 75
Setting detail: SPECIMEN
Discharge: HOME | End: 2025-05-16

## 2025-05-16 DIAGNOSIS — D63.1 ANEMIA DUE TO STAGE 3B CHRONIC KIDNEY DISEASE (HCC): ICD-10-CM

## 2025-05-16 DIAGNOSIS — N18.32 ANEMIA DUE TO STAGE 3B CHRONIC KIDNEY DISEASE (HCC): ICD-10-CM

## 2025-05-16 DIAGNOSIS — E78.5 HYPERLIPIDEMIA, UNSPECIFIED HYPERLIPIDEMIA TYPE: ICD-10-CM

## 2025-05-16 DIAGNOSIS — E11.8 DIABETES MELLITUS TYPE 2 WITH COMPLICATIONS (HCC): ICD-10-CM

## 2025-05-16 DIAGNOSIS — I10 HYPERTENSION, UNSPECIFIED TYPE: ICD-10-CM

## 2025-05-16 LAB
ALBUMIN SERPL-MCNC: 3.7 G/DL (ref 3.5–5.2)
ALBUMIN/GLOB SERPL: 1.4 {RATIO} (ref 1–2.5)
ALP SERPL-CCNC: 170 U/L (ref 35–104)
ALT SERPL-CCNC: 15 U/L (ref 10–35)
ANION GAP SERPL CALCULATED.3IONS-SCNC: 12 MMOL/L (ref 9–16)
AST SERPL-CCNC: 20 U/L (ref 10–35)
BILIRUB SERPL-MCNC: 0.3 MG/DL (ref 0–1.2)
BUN SERPL-MCNC: 35 MG/DL (ref 8–23)
CALCIUM SERPL-MCNC: 9.1 MG/DL (ref 8.6–10.4)
CHLORIDE SERPL-SCNC: 108 MMOL/L (ref 98–107)
CHOLEST SERPL-MCNC: 138 MG/DL (ref 0–199)
CHOLESTEROL/HDL RATIO: 2.9
CO2 SERPL-SCNC: 19 MMOL/L (ref 20–31)
CREAT SERPL-MCNC: 1.7 MG/DL (ref 0.6–0.9)
CREAT UR-MCNC: 107 MG/DL (ref 28–217)
ERYTHROCYTE [DISTWIDTH] IN BLOOD BY AUTOMATED COUNT: 15.7 % (ref 11.8–14.4)
FERRITIN SERPL-MCNC: 59 NG/ML
GFR, ESTIMATED: 31 ML/MIN/1.73M2
GLUCOSE SERPL-MCNC: 178 MG/DL (ref 74–99)
HCT VFR BLD AUTO: 25.3 % (ref 36.3–47.1)
HDLC SERPL-MCNC: 47 MG/DL
HGB BLD-MCNC: 7.8 G/DL (ref 11.9–15.1)
IRON SATN MFR SERPL: 10 % (ref 20–55)
IRON SERPL-MCNC: 34 UG/DL (ref 37–145)
LDLC SERPL CALC-MCNC: 70 MG/DL (ref 0–100)
MCH RBC QN AUTO: 28.3 PG (ref 25.2–33.5)
MCHC RBC AUTO-ENTMCNC: 30.8 G/DL (ref 28.4–34.8)
MCV RBC AUTO: 91.7 FL (ref 82.6–102.9)
MICROALBUMIN UR-MCNC: 40 MG/L (ref 0–20)
MICROALBUMIN/CREAT UR-RTO: 37 MCG/MG CREAT (ref 0–25)
NRBC BLD-RTO: 0 PER 100 WBC
PLATELET # BLD AUTO: 299 K/UL (ref 138–453)
PMV BLD AUTO: 9.3 FL (ref 8.1–13.5)
POTASSIUM SERPL-SCNC: 5 MMOL/L (ref 3.7–5.3)
PROT SERPL-MCNC: 6.3 G/DL (ref 6.6–8.7)
RBC # BLD AUTO: 2.76 M/UL (ref 3.95–5.11)
SODIUM SERPL-SCNC: 139 MMOL/L (ref 136–145)
TIBC SERPL-MCNC: 328 UG/DL (ref 250–450)
TRIGL SERPL-MCNC: 103 MG/DL
UNSATURATED IRON BINDING CAPACITY: 294 UG/DL (ref 112–347)
VLDLC SERPL CALC-MCNC: 21 MG/DL (ref 1–30)
WBC OTHER # BLD: 10.6 K/UL (ref 3.5–11.3)

## 2025-05-17 LAB
EST. AVERAGE GLUCOSE BLD GHB EST-MCNC: 143 MG/DL
HBA1C MFR BLD: 6.6 % (ref 4–6)

## 2025-05-25 DIAGNOSIS — M43.10 SPONDYLOLISTHESIS, UNSPECIFIED SPINAL REGION: ICD-10-CM

## 2025-05-25 DIAGNOSIS — B37.2 YEAST DERMATITIS: ICD-10-CM

## 2025-05-25 DIAGNOSIS — I10 HYPERTENSION, UNSPECIFIED TYPE: ICD-10-CM

## 2025-05-28 NOTE — TELEPHONE ENCOUNTER
LOV 1/29/25   RTO 5/29/25  LRF 8/5/24          Controlled Substance Monitoring:    Acute and Chronic Pain Monitoring:   RX Monitoring Periodic Controlled Substance Monitoring   2/28/2022  11:32 PM No signs of potential drug abuse or diversion identified.

## 2025-05-29 ENCOUNTER — RESULTS FOLLOW-UP (OUTPATIENT)
Dept: FAMILY MEDICINE CLINIC | Age: 75
End: 2025-05-29

## 2025-05-29 ENCOUNTER — OFFICE VISIT (OUTPATIENT)
Dept: FAMILY MEDICINE CLINIC | Age: 75
End: 2025-05-29

## 2025-05-29 VITALS
SYSTOLIC BLOOD PRESSURE: 140 MMHG | BODY MASS INDEX: 36.25 KG/M2 | DIASTOLIC BLOOD PRESSURE: 78 MMHG | HEART RATE: 64 BPM | OXYGEN SATURATION: 96 % | TEMPERATURE: 97.8 F | WEIGHT: 211.2 LBS

## 2025-05-29 DIAGNOSIS — F32.1 MODERATE SINGLE CURRENT EPISODE OF MAJOR DEPRESSIVE DISORDER (HCC): ICD-10-CM

## 2025-05-29 DIAGNOSIS — F41.9 ANXIETY: ICD-10-CM

## 2025-05-29 DIAGNOSIS — N18.32 ANEMIA DUE TO STAGE 3B CHRONIC KIDNEY DISEASE (HCC): ICD-10-CM

## 2025-05-29 DIAGNOSIS — M54.50 CHRONIC BILATERAL LOW BACK PAIN WITHOUT SCIATICA: ICD-10-CM

## 2025-05-29 DIAGNOSIS — M15.9 GENERALIZED OSTEOARTHRITIS: ICD-10-CM

## 2025-05-29 DIAGNOSIS — E11.8 DIABETES MELLITUS TYPE 2 WITH COMPLICATIONS (HCC): ICD-10-CM

## 2025-05-29 DIAGNOSIS — M25.551 RIGHT HIP PAIN: ICD-10-CM

## 2025-05-29 DIAGNOSIS — R93.6: ICD-10-CM

## 2025-05-29 DIAGNOSIS — E66.812 CLASS 2 SEVERE OBESITY DUE TO EXCESS CALORIES WITH SERIOUS COMORBIDITY AND BODY MASS INDEX (BMI) OF 38.0 TO 38.9 IN ADULT (HCC): ICD-10-CM

## 2025-05-29 DIAGNOSIS — J30.1 CHRONIC SEASONAL ALLERGIC RHINITIS DUE TO POLLEN: ICD-10-CM

## 2025-05-29 DIAGNOSIS — R25.1 TREMOR: ICD-10-CM

## 2025-05-29 DIAGNOSIS — B37.2 YEAST DERMATITIS: ICD-10-CM

## 2025-05-29 DIAGNOSIS — G89.29 CHRONIC LEFT SHOULDER PAIN: ICD-10-CM

## 2025-05-29 DIAGNOSIS — E53.8 B12 DEFICIENCY: ICD-10-CM

## 2025-05-29 DIAGNOSIS — E66.01 CLASS 2 SEVERE OBESITY DUE TO EXCESS CALORIES WITH SERIOUS COMORBIDITY AND BODY MASS INDEX (BMI) OF 38.0 TO 38.9 IN ADULT (HCC): ICD-10-CM

## 2025-05-29 DIAGNOSIS — E78.5 HYPERLIPIDEMIA, UNSPECIFIED HYPERLIPIDEMIA TYPE: ICD-10-CM

## 2025-05-29 DIAGNOSIS — M25.552 LEFT HIP PAIN: ICD-10-CM

## 2025-05-29 DIAGNOSIS — I10 HYPERTENSION, UNSPECIFIED TYPE: Primary | ICD-10-CM

## 2025-05-29 DIAGNOSIS — S80.01XD TRAUMATIC HEMATOMA OF RIGHT KNEE, SUBSEQUENT ENCOUNTER: ICD-10-CM

## 2025-05-29 DIAGNOSIS — K21.9 GASTROESOPHAGEAL REFLUX DISEASE WITHOUT ESOPHAGITIS: ICD-10-CM

## 2025-05-29 DIAGNOSIS — M79.604 RIGHT LEG PAIN: ICD-10-CM

## 2025-05-29 DIAGNOSIS — G25.81 RESTLESS LEG SYNDROME: ICD-10-CM

## 2025-05-29 DIAGNOSIS — M85.89 OSTEOPENIA OF MULTIPLE SITES: ICD-10-CM

## 2025-05-29 DIAGNOSIS — K44.9 HIATAL HERNIA: ICD-10-CM

## 2025-05-29 DIAGNOSIS — R79.89 ABNORMAL THYROID BLOOD TEST: ICD-10-CM

## 2025-05-29 DIAGNOSIS — M43.10 SPONDYLOLISTHESIS, UNSPECIFIED SPINAL REGION: ICD-10-CM

## 2025-05-29 DIAGNOSIS — E11.42 DIABETIC POLYNEUROPATHY ASSOCIATED WITH TYPE 2 DIABETES MELLITUS (HCC): ICD-10-CM

## 2025-05-29 DIAGNOSIS — G47.31 CENTRAL SLEEP APNEA: ICD-10-CM

## 2025-05-29 DIAGNOSIS — D63.1 ANEMIA DUE TO STAGE 3B CHRONIC KIDNEY DISEASE (HCC): ICD-10-CM

## 2025-05-29 DIAGNOSIS — G47.33 OSA (OBSTRUCTIVE SLEEP APNEA): ICD-10-CM

## 2025-05-29 DIAGNOSIS — K58.0 IRRITABLE BOWEL SYNDROME WITH DIARRHEA: ICD-10-CM

## 2025-05-29 DIAGNOSIS — Z98.1 S/P LUMBAR FUSION: ICD-10-CM

## 2025-05-29 DIAGNOSIS — M25.512 CHRONIC LEFT SHOULDER PAIN: ICD-10-CM

## 2025-05-29 DIAGNOSIS — G89.29 CHRONIC BILATERAL LOW BACK PAIN WITHOUT SCIATICA: ICD-10-CM

## 2025-05-29 DIAGNOSIS — N18.32 STAGE 3B CHRONIC KIDNEY DISEASE (HCC): ICD-10-CM

## 2025-05-29 DIAGNOSIS — J45.20 MILD INTERMITTENT ASTHMA WITHOUT COMPLICATION: ICD-10-CM

## 2025-05-29 DIAGNOSIS — M17.12 PRIMARY OSTEOARTHRITIS OF LEFT KNEE: ICD-10-CM

## 2025-05-29 RX ORDER — LOSARTAN POTASSIUM 100 MG/1
100 TABLET ORAL DAILY
Qty: 90 TABLET | Refills: 1 | Status: SHIPPED | OUTPATIENT
Start: 2025-05-29

## 2025-05-29 RX ORDER — TIZANIDINE 2 MG/1
TABLET ORAL
Qty: 270 TABLET | Refills: 1 | Status: SHIPPED | OUTPATIENT
Start: 2025-05-29 | End: 2025-05-29 | Stop reason: SDUPTHER

## 2025-05-29 RX ORDER — NYSTATIN 100000 U/G
OINTMENT TOPICAL
Qty: 90 G | Refills: 1 | Status: SHIPPED | OUTPATIENT
Start: 2025-05-29

## 2025-05-29 RX ORDER — NYSTATIN 100000 U/G
OINTMENT TOPICAL
Qty: 90 G | Refills: 1 | Status: SHIPPED | OUTPATIENT
Start: 2025-05-29 | End: 2025-05-29 | Stop reason: SDUPTHER

## 2025-05-29 RX ORDER — TIZANIDINE 2 MG/1
2 TABLET ORAL EVERY 8 HOURS PRN
Qty: 90 TABLET | Refills: 1 | Status: SHIPPED | OUTPATIENT
Start: 2025-05-29 | End: 2025-11-25

## 2025-05-29 ASSESSMENT — ENCOUNTER SYMPTOMS
BLOOD IN STOOL: 0
SINUS PRESSURE: 0
EYE DISCHARGE: 0
EYE REDNESS: 0
NAUSEA: 0
RHINORRHEA: 0
CONSTIPATION: 0
COLOR CHANGE: 0
DIARRHEA: 1
ABDOMINAL PAIN: 0
CHOKING: 0
TROUBLE SWALLOWING: 0
SORE THROAT: 0
WHEEZING: 0
COUGH: 0
SHORTNESS OF BREATH: 0
EYE PAIN: 0
STRIDOR: 0
VOMITING: 0
CHEST TIGHTNESS: 0

## 2025-05-29 NOTE — ASSESSMENT & PLAN NOTE
Orders:    Comprehensive Metabolic Panel; Future    Culture, Urine; Future    Urinalysis with Microscopic; Future

## 2025-05-29 NOTE — PROGRESS NOTES
Codi Mcneill (:  1950) is a 75 y.o. female,Established patient, here for evaluation of the following chief complaint(s):    Hypertension, Diabetes (T2DM), and Fall (3 weeks ago. Pain in right lower leg. )         Assessment & Plan  Hypertension, unspecified type            Hyperlipidemia, unspecified hyperlipidemia type            Class 2 severe obesity due to excess calories with serious comorbidity and body mass index (BMI) of 38.0 to 38.9 in adult (HCC)            Mild intermittent asthma without complication            Chronic seasonal allergic rhinitis due to pollen            KRISHNA (obstructive sleep apnea)            Central sleep apnea            Restless leg syndrome            Gastroesophageal reflux disease without esophagitis            Hiatal hernia            Irritable bowel syndrome with diarrhea            Moderate single current episode of major depressive disorder (HCC)            Anxiety            Chronic bilateral low back pain without sciatica            Spondylolisthesis, unspecified spinal region       Orders:    tiZANidine (ZANAFLEX) 2 MG tablet; Take 1 tablet by mouth every 8 hours as needed (muscle spasm)    S/P lumbar fusion            Diabetes mellitus type 2 with complications (HCC)            Stage 3b chronic kidney disease (HCC)       Orders:    Comprehensive Metabolic Panel; Future    Culture, Urine; Future    Urinalysis with Microscopic; Future    Diabetic polyneuropathy associated with type 2 diabetes mellitus (HCC)            Generalized osteoarthritis            Primary osteoarthritis of left knee            Chronic left shoulder pain            Right hip pain            Left hip pain            B12 deficiency            Abnormal thyroid blood test            Anemia due to stage 3b chronic kidney disease (HCC)       Orders:    CBC; Future    Iron and TIBC; Future    Ferritin; Future    Osteopenia of multiple sites            Tremor            Traumatic hematoma of 
Plan:  - routine care, strict I and O, daily weights  - bilirubin prior to discharge   - hearing screen  - [facility],  screen  - parental education and anticipatory guidance.

## 2025-05-29 NOTE — ASSESSMENT & PLAN NOTE
Orders:    tiZANidine (ZANAFLEX) 2 MG tablet; Take 1 tablet by mouth every 8 hours as needed (muscle spasm)

## 2025-06-04 ENCOUNTER — OFFICE VISIT (OUTPATIENT)
Age: 75
End: 2025-06-04
Payer: MEDICARE

## 2025-06-04 VITALS — HEIGHT: 64 IN | WEIGHT: 211 LBS | BODY MASS INDEX: 36.02 KG/M2

## 2025-06-04 DIAGNOSIS — S80.01XD TRAUMATIC HEMATOMA OF RIGHT KNEE, SUBSEQUENT ENCOUNTER: ICD-10-CM

## 2025-06-04 DIAGNOSIS — M79.661 RIGHT CALF PAIN: ICD-10-CM

## 2025-06-04 DIAGNOSIS — S85.991A OTHER SPECIFIED INJURY OF UNSPECIFIED BLOOD VESSEL AT LOWER LEG LEVEL, RIGHT LEG, INITIAL ENCOUNTER: ICD-10-CM

## 2025-06-04 DIAGNOSIS — M25.561 ACUTE PAIN OF RIGHT KNEE: Primary | ICD-10-CM

## 2025-06-04 PROCEDURE — 1090F PRES/ABSN URINE INCON ASSESS: CPT

## 2025-06-04 PROCEDURE — 1123F ACP DISCUSS/DSCN MKR DOCD: CPT

## 2025-06-04 PROCEDURE — G8427 DOCREV CUR MEDS BY ELIG CLIN: HCPCS

## 2025-06-04 PROCEDURE — 1036F TOBACCO NON-USER: CPT

## 2025-06-04 PROCEDURE — 1159F MED LIST DOCD IN RCRD: CPT

## 2025-06-04 PROCEDURE — G8417 CALC BMI ABV UP PARAM F/U: HCPCS

## 2025-06-04 PROCEDURE — G8399 PT W/DXA RESULTS DOCUMENT: HCPCS

## 2025-06-04 PROCEDURE — 99204 OFFICE O/P NEW MOD 45 MIN: CPT

## 2025-06-04 NOTE — PROGRESS NOTES
[facility]      [facility]  [address]  [place] [address]  Dept: [phone]  Dept Fax: [phone]    Chief Compliant:  Chief Complaint   Patient presents with    Knee Pain     Right knee hematoma        History of Present Illness:  This is a pleasant 75 y.o. female who presents to the clinic today for evaluation / follow up of right knee traumatic hematoma.  Patient notes that back on 5/6, she tripped and fell over a dog bowl.  She did go to the ER with all the pain that she had to rule out fracture.  She had a significant amount of swelling in her leg and has noted that she had very large bruise that has gotten better over time however she still notes that she has pain at this point and is concerned.  She has been taking Motrin as needed for pain.  She also notes that she has some pain in her calf as well.  She states that she has not been quite as active since this injury.  She does take aspirin on a day-to-day basis.  She denies any recent travel.  She denies history of cancer.  She presents today for further evaluation and treatment.      Physical Exam:    Right lower extremity: She is able to fully extend her knee on exam today.  She can flex back to probably about 90 degrees or so.  She does have tenderness to palpation along the medial lateral joint lines.  Scar present from history of total knee arthroplasty.  There is superficial skin abrasion present that is healing up nicely.  No erythema.  No foul odor.  No seepage or drainage come from the site.  No signs infectious process ongoing.  She does have soft tissue swelling present secondary to hematoma more so the area of the Pez anserine bursa.  She stable varus valgus stress at 0 and 30 degrees.  She does have tenderness to patient along the calf.  She has some pain with Homans' sign.  No major warmth in

## 2025-06-05 ENCOUNTER — TELEPHONE (OUTPATIENT)
Age: 75
End: 2025-06-05

## 2025-06-05 ENCOUNTER — HOSPITAL ENCOUNTER (OUTPATIENT)
Dept: VASCULAR LAB | Age: 75
Discharge: HOME | End: 2025-06-07
Payer: MEDICARE

## 2025-06-05 DIAGNOSIS — S85.991A OTHER SPECIFIED INJURY OF UNSPECIFIED BLOOD VESSEL AT LOWER LEG LEVEL, RIGHT LEG, INITIAL ENCOUNTER: ICD-10-CM

## 2025-06-05 DIAGNOSIS — M25.561 ACUTE PAIN OF RIGHT KNEE: ICD-10-CM

## 2025-06-05 PROCEDURE — 93971 EXTREMITY STUDY: CPT

## 2025-06-05 NOTE — TELEPHONE ENCOUNTER
Called patient to inform her that the results of her duplex ultrasound of the right lower extremity were normal.  Plan to follow-up at regular scheduled appointment in 3 weeks.  Patient demonstrates understanding and is agreeable with the plan.

## 2025-06-08 DIAGNOSIS — F32.9 MAJOR DEPRESSIVE DISORDER WITH CURRENT ACTIVE EPISODE, UNSPECIFIED DEPRESSION EPISODE SEVERITY, UNSPECIFIED WHETHER RECURRENT: ICD-10-CM

## 2025-06-08 DIAGNOSIS — E78.5 HYPERLIPIDEMIA, UNSPECIFIED HYPERLIPIDEMIA TYPE: ICD-10-CM

## 2025-06-09 NOTE — TELEPHONE ENCOUNTER
LOV 5/29/25   RTO 10/17/25  LRF 8/21/24          Controlled Substance Monitoring:    Acute and Chronic Pain Monitoring:   RX Monitoring Periodic Controlled Substance Monitoring   2/28/2022  11:32 PM No signs of potential drug abuse or diversion identified.

## 2025-06-09 NOTE — DISCHARGE INSTRUCTIONS
Detail Level: Detailed The patient was advised to not drink alcohol, drive, or operate heavy machinery while taking the Norco.     Rest, ice, compression, elevation for right knee pain.  Use walker as needed for ambulation.  Follow-up with orthopedics as needed for further evaluation and management.      Recommended patient follow up with PCP for further evaluation and management. Return to ED if patient develops worsening pain, swelling, bruising, numbness, tingling, and inability to bear weight.

## 2025-06-10 RX ORDER — TRAZODONE HYDROCHLORIDE 100 MG/1
TABLET ORAL
Qty: 180 TABLET | Refills: 3 | Status: SHIPPED | OUTPATIENT
Start: 2025-06-10

## 2025-06-10 RX ORDER — ATORVASTATIN CALCIUM 80 MG/1
80 TABLET, FILM COATED ORAL DAILY
Qty: 90 TABLET | Refills: 3 | Status: SHIPPED | OUTPATIENT
Start: 2025-06-10

## 2025-06-12 ENCOUNTER — HOSPITAL ENCOUNTER (OUTPATIENT)
Age: 75
Setting detail: SPECIMEN
Discharge: HOME | End: 2025-06-12

## 2025-06-12 DIAGNOSIS — S80.01XD TRAUMATIC HEMATOMA OF RIGHT KNEE, SUBSEQUENT ENCOUNTER: ICD-10-CM

## 2025-06-12 DIAGNOSIS — D63.1 ANEMIA DUE TO STAGE 3B CHRONIC KIDNEY DISEASE (HCC): ICD-10-CM

## 2025-06-12 DIAGNOSIS — N18.32 STAGE 3B CHRONIC KIDNEY DISEASE (HCC): ICD-10-CM

## 2025-06-12 DIAGNOSIS — N18.32 ANEMIA DUE TO STAGE 3B CHRONIC KIDNEY DISEASE (HCC): ICD-10-CM

## 2025-06-12 LAB
ALBUMIN SERPL-MCNC: 3.9 G/DL (ref 3.5–5.2)
ALBUMIN/GLOB SERPL: 1.6 {RATIO} (ref 1–2.5)
ALP SERPL-CCNC: 171 U/L (ref 35–104)
ALT SERPL-CCNC: 19 U/L (ref 10–35)
ANION GAP SERPL CALCULATED.3IONS-SCNC: 12 MMOL/L (ref 9–16)
AST SERPL-CCNC: 21 U/L (ref 10–35)
BACTERIA URNS QL MICRO: NORMAL
BILIRUB SERPL-MCNC: 0.2 MG/DL (ref 0–1.2)
BILIRUB UR QL STRIP: NEGATIVE
BUN SERPL-MCNC: 32 MG/DL (ref 8–23)
CALCIUM SERPL-MCNC: 9.4 MG/DL (ref 8.6–10.4)
CASTS #/AREA URNS LPF: NORMAL /LPF (ref 0–8)
CHLORIDE SERPL-SCNC: 103 MMOL/L (ref 98–107)
CLARITY UR: CLEAR
CO2 SERPL-SCNC: 22 MMOL/L (ref 20–31)
COLOR UR: YELLOW
CREAT SERPL-MCNC: 1.6 MG/DL (ref 0.6–0.9)
EPI CELLS #/AREA URNS HPF: NORMAL /HPF (ref 0–5)
ERYTHROCYTE [DISTWIDTH] IN BLOOD BY AUTOMATED COUNT: 14.1 % (ref 11.8–14.4)
FERRITIN SERPL-MCNC: 38 NG/ML
GFR, ESTIMATED: 33 ML/MIN/1.73M2
GLUCOSE SERPL-MCNC: 115 MG/DL (ref 74–99)
GLUCOSE UR STRIP-MCNC: NEGATIVE MG/DL
HCT VFR BLD AUTO: 28.6 % (ref 36.3–47.1)
HGB BLD-MCNC: 8.8 G/DL (ref 11.9–15.1)
HGB UR QL STRIP.AUTO: NEGATIVE
IRON SATN MFR SERPL: 8 % (ref 20–55)
IRON SERPL-MCNC: 29 UG/DL (ref 37–145)
KETONES UR STRIP-MCNC: NEGATIVE MG/DL
LEUKOCYTE ESTERASE UR QL STRIP: NEGATIVE
MCH RBC QN AUTO: 26.8 PG (ref 25.2–33.5)
MCHC RBC AUTO-ENTMCNC: 30.8 G/DL (ref 28.4–34.8)
MCV RBC AUTO: 87.2 FL (ref 82.6–102.9)
NITRITE UR QL STRIP: NEGATIVE
NRBC BLD-RTO: 0 PER 100 WBC
PH UR STRIP: 6 [PH] (ref 5–8)
PLATELET # BLD AUTO: 275 K/UL (ref 138–453)
PMV BLD AUTO: 9.3 FL (ref 8.1–13.5)
POTASSIUM SERPL-SCNC: 4.9 MMOL/L (ref 3.7–5.3)
PROT SERPL-MCNC: 6.4 G/DL (ref 6.6–8.7)
PROT UR STRIP-MCNC: NEGATIVE MG/DL
RBC # BLD AUTO: 3.28 M/UL (ref 3.95–5.11)
RBC #/AREA URNS HPF: NORMAL /HPF (ref 0–4)
SODIUM SERPL-SCNC: 137 MMOL/L (ref 136–145)
SP GR UR STRIP: 1.01 (ref 1–1.03)
TIBC SERPL-MCNC: 346 UG/DL (ref 250–450)
UNSATURATED IRON BINDING CAPACITY: 317 UG/DL (ref 112–347)
UROBILINOGEN UR STRIP-ACNC: NORMAL EU/DL (ref 0–1)
WBC #/AREA URNS HPF: NORMAL /HPF (ref 0–5)
WBC OTHER # BLD: 8.2 K/UL (ref 3.5–11.3)

## 2025-06-13 ENCOUNTER — RESULTS FOLLOW-UP (OUTPATIENT)
Dept: FAMILY MEDICINE CLINIC | Age: 75
End: 2025-06-13

## 2025-06-13 DIAGNOSIS — N18.32 ANEMIA DUE TO STAGE 3B CHRONIC KIDNEY DISEASE (HCC): ICD-10-CM

## 2025-06-13 DIAGNOSIS — D63.1 ANEMIA DUE TO STAGE 3B CHRONIC KIDNEY DISEASE (HCC): ICD-10-CM

## 2025-06-13 DIAGNOSIS — N18.32 STAGE 3B CHRONIC KIDNEY DISEASE (HCC): Primary | ICD-10-CM

## 2025-06-13 LAB
MICROORGANISM SPEC CULT: NORMAL
SERVICE CMNT-IMP: NORMAL
SPECIMEN DESCRIPTION: NORMAL

## 2025-06-25 ENCOUNTER — OFFICE VISIT (OUTPATIENT)
Age: 75
End: 2025-06-25
Payer: MEDICARE

## 2025-06-25 DIAGNOSIS — S80.01XD TRAUMATIC HEMATOMA OF RIGHT KNEE, SUBSEQUENT ENCOUNTER: Primary | ICD-10-CM

## 2025-06-25 PROCEDURE — 99213 OFFICE O/P EST LOW 20 MIN: CPT

## 2025-06-25 PROCEDURE — 1090F PRES/ABSN URINE INCON ASSESS: CPT

## 2025-06-25 PROCEDURE — 1123F ACP DISCUSS/DSCN MKR DOCD: CPT

## 2025-06-25 PROCEDURE — G8399 PT W/DXA RESULTS DOCUMENT: HCPCS

## 2025-06-25 PROCEDURE — G8417 CALC BMI ABV UP PARAM F/U: HCPCS

## 2025-06-25 PROCEDURE — G8428 CUR MEDS NOT DOCUMENT: HCPCS

## 2025-06-25 PROCEDURE — 1036F TOBACCO NON-USER: CPT

## 2025-06-25 NOTE — PROGRESS NOTES
[facility] & [facility]      [facility]ICS AND SPORTS MEDICINE  [address]  [place] [address]  Dept: [phone]  Dept Fax: [phone]    Chief Compliant:  Chief Complaint   Patient presents with    Follow-up     Right knee f/u        History of Present Illness:  This is a pleasant 75 y.o. female who presents to the clinic today for evaluation / follow up of right knee hematoma.  Last visit, I did order duplex ultrasound the right lower extremity due to calf tenderness.  This was negative for DVT.  She states that she is doing much better than when she was 3 weeks ago.  She states that she has full range of motion is back to normal at this point.  She presents today for further evaluation and treatment.      Physical Exam:    Right knee: Patient has full knee extension on exam today.  Flexion back to 120 degrees.  She does have some superficial skin abrasions present around her incisional scar.  She stable varus valgus stress at 0 and 30 degrees.  No tenderness along the joint lines.  No tenderness posteriorly.  Calf is soft and supple.  No tenderness along the calf today.  She does have slight tenderness over the medial tibial plateau today.    Nursing note and vitals reviewed.     Labs and Imaging:     XR taken today:  No results found.      No orders of the defined types were placed in this encounter.      Assessment and Plan:  1. Traumatic hematoma of right knee, subsequent encounter          This is a pleasant 75 y.o. female with right knee hematoma.  She is doing very well at this point.  She can continue with the current pain regimen.  Ice and heat as needed.  At this point I am comfortable seeing her back as needed.  She felt comfortable with this as well.  Patient demonstrates understanding and is agreeable to plan.        Review of Systems   Constitutional: Negative for fever, chills,

## 2025-07-22 ENCOUNTER — HOSPITAL ENCOUNTER (OUTPATIENT)
Age: 75
Setting detail: SPECIMEN
Discharge: HOME | End: 2025-07-22

## 2025-07-22 DIAGNOSIS — N18.32 ANEMIA DUE TO STAGE 3B CHRONIC KIDNEY DISEASE (HCC): ICD-10-CM

## 2025-07-22 DIAGNOSIS — D63.1 ANEMIA DUE TO STAGE 3B CHRONIC KIDNEY DISEASE (HCC): ICD-10-CM

## 2025-07-22 DIAGNOSIS — N18.32 STAGE 3B CHRONIC KIDNEY DISEASE (HCC): ICD-10-CM

## 2025-07-22 LAB
ANION GAP SERPL CALCULATED.3IONS-SCNC: 12 MMOL/L (ref 9–16)
BUN SERPL-MCNC: 25 MG/DL (ref 8–23)
CALCIUM SERPL-MCNC: 9.4 MG/DL (ref 8.6–10.4)
CHLORIDE SERPL-SCNC: 106 MMOL/L (ref 98–107)
CO2 SERPL-SCNC: 22 MMOL/L (ref 20–31)
CREAT SERPL-MCNC: 1.5 MG/DL (ref 0.6–0.9)
ERYTHROCYTE [DISTWIDTH] IN BLOOD BY AUTOMATED COUNT: 15.2 % (ref 11.8–14.4)
FERRITIN SERPL-MCNC: 20 NG/ML
GFR, ESTIMATED: 36 ML/MIN/1.73M2
GLUCOSE SERPL-MCNC: 133 MG/DL (ref 74–99)
HCT VFR BLD AUTO: 29.4 % (ref 36.3–47.1)
HGB BLD-MCNC: 8.9 G/DL (ref 11.9–15.1)
IRON SATN MFR SERPL: 10 % (ref 20–55)
IRON SERPL-MCNC: 33 UG/DL (ref 37–145)
MCH RBC QN AUTO: 25.6 PG (ref 25.2–33.5)
MCHC RBC AUTO-ENTMCNC: 30.3 G/DL (ref 28.4–34.8)
MCV RBC AUTO: 84.5 FL (ref 82.6–102.9)
NRBC BLD-RTO: 0 PER 100 WBC
PLATELET # BLD AUTO: 278 K/UL (ref 138–453)
PMV BLD AUTO: 9.2 FL (ref 8.1–13.5)
POTASSIUM SERPL-SCNC: 4.7 MMOL/L (ref 3.7–5.3)
RBC # BLD AUTO: 3.48 M/UL (ref 3.95–5.11)
SODIUM SERPL-SCNC: 140 MMOL/L (ref 136–145)
TIBC SERPL-MCNC: 344 UG/DL (ref 250–450)
UNSATURATED IRON BINDING CAPACITY: 311 UG/DL (ref 112–347)
WBC OTHER # BLD: 7.6 K/UL (ref 3.5–11.3)

## 2025-07-30 ENCOUNTER — TELEPHONE (OUTPATIENT)
Dept: NEUROLOGY | Age: 75
End: 2025-07-30

## 2025-07-30 NOTE — TELEPHONE ENCOUNTER
07/30/25 Spoke with patient to schedule her annual follow-up. Pt did not want to schedule.   Thank you.

## 2025-08-12 DIAGNOSIS — N18.32 ANEMIA DUE TO STAGE 3B CHRONIC KIDNEY DISEASE (HCC): ICD-10-CM

## 2025-08-12 DIAGNOSIS — D63.1 ANEMIA DUE TO STAGE 3B CHRONIC KIDNEY DISEASE (HCC): ICD-10-CM

## 2025-08-12 LAB
CONTROL: YES
FECAL BLOOD IMMUNOCHEMICAL TEST: POSITIVE

## 2025-08-12 PROCEDURE — 82274 ASSAY TEST FOR BLOOD FECAL: CPT | Performed by: PEDIATRICS

## 2025-08-25 ENCOUNTER — TELEPHONE (OUTPATIENT)
Dept: GASTROENTEROLOGY | Age: 75
End: 2025-08-25

## (undated) DEVICE — Device: Brand: DEFENDO VALVE AND CONNECTOR KIT

## (undated) DEVICE — TUBING, SUCTION, 1/4" X 12', STRAIGHT: Brand: MEDLINE

## (undated) DEVICE — GLOVE SURG 8 11.7IN BEAD CUF LIGHT BRN SENSICARE LTX FREE

## (undated) DEVICE — BLOCK BITE 60FR RUBBER ADLT DENTAL

## (undated) DEVICE — BASIN EMSIS 700ML GRAPHITE PLAS KID SHP GRAD

## (undated) DEVICE — SNARE ENDOSCP M L240CM LOOP W27MM SHTH DIA2.4MM OVL FLX

## (undated) DEVICE — CO2 CANNULA,SUPERSOFT, ADLT,7'O2,7'CO2: Brand: MEDLINE

## (undated) DEVICE — FORCEPS BX L240CM WRK CHN 2.8MM STD CAP W/ NDL MIC MESH

## (undated) DEVICE — CONMED DISPOSABLE GASTROINTESTINAL CYTOLOGY BRUSH, STRAIGHT HANDLE, 2.5 MM X 160 CM: Brand: CONMED

## (undated) DEVICE — CUP MED 1OZ CLR POLYPR FEED GRAD W/O LID

## (undated) DEVICE — ADAPTER TBNG LUER STUB 15 GA INTMED

## (undated) DEVICE — JELLY,LUBE,STERILE,FLIP TOP,TUBE,2-OZ: Brand: MEDLINE

## (undated) DEVICE — GOWN,AURORA,NONREINFORCED,LARGE: Brand: MEDLINE

## (undated) DEVICE — MEDICINE CUP, GRADUATED, STER: Brand: MEDLINE

## (undated) DEVICE — FORCEPS BX L L240CM DIA2.4MM RAD JAW 4 HOT FOR POLYP DISP

## (undated) DEVICE — ELECTRODE PT RET AD L9FT HI MOIST COND ADH HYDRGEL CORDED

## (undated) DEVICE — YANKAUER,FLEXIBLE HANDLE,REGLR CAPACITY: Brand: MEDLINE INDUSTRIES, INC.

## (undated) DEVICE — SYRINGE MED 50ML LUERLOCK TIP

## (undated) DEVICE — SINGLE-USE BIOPSY FORCEPS: Brand: RADIAL JAW 4

## (undated) DEVICE — GAUZE,SPONGE,4"X4",16PLY,STRL,LF,10/TRAY: Brand: MEDLINE

## (undated) DEVICE — TRAP SURG QUAD PARABOLA SLOT DSGN SFTY SCRN TRAPEASE